# Patient Record
Sex: FEMALE | Race: WHITE | NOT HISPANIC OR LATINO | Employment: OTHER | URBAN - METROPOLITAN AREA
[De-identification: names, ages, dates, MRNs, and addresses within clinical notes are randomized per-mention and may not be internally consistent; named-entity substitution may affect disease eponyms.]

---

## 2021-01-18 ENCOUNTER — HOSPITAL ENCOUNTER (EMERGENCY)
Facility: CLINIC | Age: 61
Discharge: HOME | End: 2021-01-18
Attending: EMERGENCY MEDICINE | Admitting: EMERGENCY MEDICINE
Payer: COMMERCIAL

## 2021-01-18 ENCOUNTER — APPOINTMENT (OUTPATIENT)
Dept: CARDIOLOGY | Facility: CLINIC | Age: 61
End: 2021-01-18
Attending: EMERGENCY MEDICINE
Payer: COMMERCIAL

## 2021-01-18 VITALS
RESPIRATION RATE: 13 BRPM | HEART RATE: 62 BPM | OXYGEN SATURATION: 96 % | SYSTOLIC BLOOD PRESSURE: 135 MMHG | TEMPERATURE: 98.5 F | DIASTOLIC BLOOD PRESSURE: 83 MMHG

## 2021-01-18 DIAGNOSIS — R00.2 PALPITATIONS: ICD-10-CM

## 2021-01-18 LAB
ANION GAP SERPL CALCULATED.3IONS-SCNC: 7 MMOL/L (ref 3–14)
BASOPHILS # BLD AUTO: 0 10E9/L (ref 0–0.2)
BASOPHILS NFR BLD AUTO: 0.6 %
BUN SERPL-MCNC: 18 MG/DL (ref 7–30)
CALCIUM SERPL-MCNC: 9.4 MG/DL (ref 8.5–10.1)
CHLORIDE SERPL-SCNC: 108 MMOL/L (ref 94–109)
CO2 SERPL-SCNC: 25 MMOL/L (ref 20–32)
CREAT SERPL-MCNC: 0.83 MG/DL (ref 0.52–1.04)
DIFFERENTIAL METHOD BLD: NORMAL
EOSINOPHIL # BLD AUTO: 0.1 10E9/L (ref 0–0.7)
EOSINOPHIL NFR BLD AUTO: 1.3 %
ERYTHROCYTE [DISTWIDTH] IN BLOOD BY AUTOMATED COUNT: 12.5 % (ref 10–15)
GFR SERPL CREATININE-BSD FRML MDRD: 77 ML/MIN/{1.73_M2}
GLUCOSE SERPL-MCNC: 101 MG/DL (ref 70–99)
HCT VFR BLD AUTO: 42 % (ref 35–47)
HGB BLD-MCNC: 13.9 G/DL (ref 11.7–15.7)
IMM GRANULOCYTES # BLD: 0 10E9/L (ref 0–0.4)
IMM GRANULOCYTES NFR BLD: 0.2 %
INTERPRETATION ECG - MUSE: NORMAL
LYMPHOCYTES # BLD AUTO: 1 10E9/L (ref 0.8–5.3)
LYMPHOCYTES NFR BLD AUTO: 20.8 %
MCH RBC QN AUTO: 32.3 PG (ref 26.5–33)
MCHC RBC AUTO-ENTMCNC: 33.1 G/DL (ref 31.5–36.5)
MCV RBC AUTO: 98 FL (ref 78–100)
MONOCYTES # BLD AUTO: 0.4 10E9/L (ref 0–1.3)
MONOCYTES NFR BLD AUTO: 7.3 %
NEUTROPHILS # BLD AUTO: 3.3 10E9/L (ref 1.6–8.3)
NEUTROPHILS NFR BLD AUTO: 69.8 %
NRBC # BLD AUTO: 0 10*3/UL
NRBC BLD AUTO-RTO: 0 /100
PLATELET # BLD AUTO: 231 10E9/L (ref 150–450)
POTASSIUM SERPL-SCNC: 4.4 MMOL/L (ref 3.4–5.3)
RBC # BLD AUTO: 4.3 10E12/L (ref 3.8–5.2)
SODIUM SERPL-SCNC: 140 MMOL/L (ref 133–144)
TROPONIN I SERPL-MCNC: <0.015 UG/L (ref 0–0.04)
WBC # BLD AUTO: 4.8 10E9/L (ref 4–11)

## 2021-01-18 PROCEDURE — 93246 EXT ECG>7D<15D RECORDING: CPT

## 2021-01-18 PROCEDURE — 93248 EXT ECG>7D<15D REV&INTERPJ: CPT | Performed by: INTERNAL MEDICINE

## 2021-01-18 PROCEDURE — 84484 ASSAY OF TROPONIN QUANT: CPT | Performed by: EMERGENCY MEDICINE

## 2021-01-18 PROCEDURE — 80048 BASIC METABOLIC PNL TOTAL CA: CPT | Performed by: EMERGENCY MEDICINE

## 2021-01-18 PROCEDURE — 99284 EMERGENCY DEPT VISIT MOD MDM: CPT

## 2021-01-18 PROCEDURE — 85025 COMPLETE CBC W/AUTO DIFF WBC: CPT | Performed by: EMERGENCY MEDICINE

## 2021-01-18 PROCEDURE — 93005 ELECTROCARDIOGRAM TRACING: CPT | Mod: 59

## 2021-01-18 RX ORDER — MULTIPLE VITAMINS W/ MINERALS TAB 9MG-400MCG
1 TAB ORAL DAILY
COMMUNITY
End: 2023-05-23

## 2021-01-18 SDOH — HEALTH STABILITY: MENTAL HEALTH: HOW OFTEN DO YOU HAVE A DRINK CONTAINING ALCOHOL?: NEVER

## 2021-01-18 ASSESSMENT — ENCOUNTER SYMPTOMS
WEAKNESS: 1
DIARRHEA: 0
SHORTNESS OF BREATH: 0
CHILLS: 0
FEVER: 0
BACK PAIN: 0
VOMITING: 0
NAUSEA: 0
PALPITATIONS: 1
COUGH: 0
ABDOMINAL PAIN: 0

## 2021-01-18 NOTE — ED TRIAGE NOTES
Pt sts she called EMS because she had 3 episodes of her heart racing.  She sts she felt the racing in her upper abd.  Pt denies cp, sob or dizziness.  EMS gave 324mg of ASA.

## 2021-01-18 NOTE — ED NOTES
Bed: ED19  Expected date:   Expected time:   Means of arrival:   Comments:  Yellow [facility] [address] 60F, CP palpitations.

## 2021-01-18 NOTE — ED PROVIDER NOTES
History   Chief Complaint:  Palpitations     The history is provided by the patient.      Sarah Frost is a 60 year old female with history of ventricular septal defect and aortic defect, congenital who presents with palpitations. She reports that she had strong heart palpitations. She reports that this morning she has felt off and clumsy. She states that she had troubles getting out of her chair during the episode due to generalized weakness. The first episode started around noon and lasted around a minute. After 2 more similar episodes of palpitations she called EMS. She denies pain, chest pain, abdominal pain, nausea, or emesis.  She denies weakness or numbness in arm or leg or speech disturbance or visual disturbance.  She states that she had 3 glasses of wine last night and usually has some 2-3 times a week. She denies withdrawals from alcohol.     Review of Systems   Constitutional: Negative for chills and fever.   Respiratory: Negative for cough and shortness of breath.    Cardiovascular: Positive for palpitations. Negative for chest pain.   Gastrointestinal: Negative for abdominal pain, diarrhea, nausea and vomiting.   Musculoskeletal: Negative for back pain.   Neurological: Positive for weakness (or arms and legs).   A 10 point ROS was obtained and negative except as noted here and in HPI      Allergies:  No known drug allergies     Medications:  Coenzyme Q    Past Medical History:    Congenital anomaly of heart valve, ventricular septal defect  Squamous cell carcinoma     Past Surgical History:    Tonsillectomy   Heart Cath   Total abdominal hysterectomy     Family History:    Heart disease, father  Cancer, mother   Mental disorder, brother  Depression, sister    Social History:  Smoking status: never smoker  Alcohol use: yes, last night none today  Drug use: no  Presents to the ED alone     Physical Exam     Patient Vitals for the past 24 hrs:   BP Temp Pulse Resp SpO2   01/18/21 1630 -- -- 62 13 96  %   01/18/21 1615 -- -- 59 12 93 %   01/18/21 1600 135/83 -- 56 8 95 %   01/18/21 1555 -- -- 60 9 95 %   01/18/21 1535 -- -- 61 14 96 %   01/18/21 1530 (!) 153/88 -- 61 17 97 %   01/18/21 1515 -- -- 58 17 97 %   01/18/21 1500 (!) 140/91 98.5  F (36.9  C) 60 17 97 %   01/18/21 1445 (!) 151/98 -- 61 15 98 %     Physical Exam  VS: Reviewed per above  HENT: Mucous membranes moist, no nuchal rigidity  EYES: sclera anicteric  CV: Rate as noted, regular rhythm.   RESP: Effort normal. Breath sounds are normal bilaterally.  GI: no tenderness/rebound/guarding, not distended.  NEURO: GCS 15, cranial nerves II through XII are intact, 5 out of 5 strength in all 4 extremities, sensation is intact light touch in all 4 extremities  MSK: No deformity of the extremities  SKIN: Warm and dry    Emergency Department Course     ECG:  ECG taken at 1526, ECG read at 1530  Sinus bradycardia  Cannot rule out anterior infarct, age undetermined  Abnormal ECG  No prior EKG to compare  Rate 59 bpm. MN interval 148 ms. QRS duration 94 ms. QT/QTc 428/423 ms. P-R-T axes -8 5 27.     Laboratory:  CBC: WBC 4.8, HGB 13.9,    BMP: Glucose 101(H), Creatinine: 0.83  Troponin (1548) <0.015     Emergency Department Course:  Reviewed:  I reviewed the patient's nursing notes, vitals, past medical records, Care Everywhere.     Assessments:  1520 I performed an assessment and examination of the patient as noted above.     1621 Findings and plan explained to the Patient. Patient discharged home with instructions regarding supportive care, medications, and reasons to return. The importance of close follow-up was reviewed.     Disposition:  The patient was discharged to home.       Impression & Plan   Medical Decision Making:  Patient presents to the ER for evaluation of episodes of palpitations today and episode of generalized weakness.  On arrival vital signs are reassuring.  On exam there are no focal neurological deficits to suggest occult stroke.  EKG  is sinus rhythm without ischemic change and single troponin is negative.  No history of chest pain to suggest occult ACS.  No evidence electrolyte derangement or acute kidney injury.  While on the telemetry monitor in the ER, there was no evidence of dysrhythmia.  Patient was set up for Zio patch while in the ER.  I encouraged close primary care follow-up.    Diagnosis:    ICD-10-CM    1. Palpitations  R00.2        Scribe Disclosure:  I, Luli Luna, am serving as a scribe at 3:17 PM on 1/18/2021 to document services personally performed by Reza Mackenzie MD based on my observations and the provider's statements to me.              Reza Mackenzie MD  01/18/21 1901

## 2021-07-04 ENCOUNTER — HEALTH MAINTENANCE LETTER (OUTPATIENT)
Age: 61
End: 2021-07-04

## 2021-07-06 ENCOUNTER — APPOINTMENT (OUTPATIENT)
Dept: URBAN - METROPOLITAN AREA CLINIC 256 | Age: 61
Setting detail: DERMATOLOGY
End: 2021-07-06

## 2021-07-06 VITALS — HEIGHT: 72 IN | WEIGHT: 293 LBS | RESPIRATION RATE: 16 BRPM

## 2021-07-06 DIAGNOSIS — L81.4 OTHER MELANIN HYPERPIGMENTATION: ICD-10-CM

## 2021-07-06 DIAGNOSIS — L82.0 INFLAMED SEBORRHEIC KERATOSIS: ICD-10-CM

## 2021-07-06 PROCEDURE — 11102 TANGNTL BX SKIN SINGLE LES: CPT

## 2021-07-06 PROCEDURE — 99202 OFFICE O/P NEW SF 15 MIN: CPT | Mod: 25

## 2021-07-06 PROCEDURE — OTHER PATHOLOGY BILLING: OTHER

## 2021-07-06 PROCEDURE — OTHER BIOPSY BY SHAVE METHOD: OTHER

## 2021-07-06 PROCEDURE — 88305 TISSUE EXAM BY PATHOLOGIST: CPT

## 2021-07-06 PROCEDURE — OTHER COUNSELING: OTHER

## 2021-07-06 ASSESSMENT — LOCATION SIMPLE DESCRIPTION DERM
LOCATION SIMPLE: CHEST
LOCATION SIMPLE: LEFT CHEEK

## 2021-07-06 ASSESSMENT — LOCATION DETAILED DESCRIPTION DERM
LOCATION DETAILED: LEFT MEDIAL SUPERIOR CHEST
LOCATION DETAILED: LEFT INFERIOR CENTRAL MALAR CHEEK

## 2021-07-06 ASSESSMENT — LOCATION ZONE DERM
LOCATION ZONE: TRUNK
LOCATION ZONE: FACE

## 2021-07-06 NOTE — PROCEDURE: PATHOLOGY BILLING
Immunohistochemistry ([id number] and [id number]) billing is not performed here. Please use the Immunohistochemistry Stain Billing plan to accomplish this. Immunohistochemistry ([id number] and [id number]) billing is not performed here. Please use the Immunohistochemistry Stain Billing plan to accomplish this.

## 2021-07-06 NOTE — PROCEDURE: BIOPSY BY SHAVE METHOD
Hide Anesthesia Volume?: No
Anesthesia Volume In Cc (Will Not Render If 0): 0.2
Depth Of Biopsy: dermis
Dressing: bandage
Silver Nitrate Text: The wound bed was treated with silver nitrate after the biopsy was performed.
Biopsy Type: H and E
X Size Of Lesion In Cm: 0
Post-Care Instructions: I reviewed with the patient in detail post-care instructions. Patient is to keep the biopsy site dry overnight, and then apply bacitracin twice daily until healed. Patient may apply hydrogen peroxide soaks to remove any crusting.
Hemostasis: Drysol
Was A Bandage Applied: Yes
Cryotherapy Text: The wound bed was treated with cryotherapy after the biopsy was performed.
Curettage Text: The wound bed was treated with curettage after the biopsy was performed.
Notification Instructions: Patient will be notified of biopsy results. However, patient instructed to call the office if not contacted within 2 weeks.
Electrodesiccation Text: The wound bed was treated with electrodesiccation after the biopsy was performed.
Consent: Written consent was obtained and risks were reviewed including but not limited to scarring, infection, bleeding, scabbing, incomplete removal, nerve damage and allergy to anesthesia.
Biopsy Method: Dermablade
Detail Level: Detailed
Information: Selecting Yes will display possible errors in your note based on the variables you have selected. This validation is only offered as a suggestion for you. PLEASE NOTE THAT THE VALIDATION TEXT WILL BE REMOVED WHEN YOU FINALIZE YOUR NOTE. IF YOU WANT TO FAX A PRELIMINARY NOTE YOU WILL NEED TO TOGGLE THIS TO 'NO' IF YOU DO NOT WANT IT IN YOUR FAXED NOTE.
Electrodesiccation And Curettage Text: The wound bed was treated with electrodesiccation and curettage after the biopsy was performed.
Anesthesia Type: 2% lidocaine with epinephrine and a 1:10 solution of 8.4% sodium bicarbonate
Wound Care: Petrolatum
Billing Type: Client Bill
Type Of Destruction Used: Curettage

## 2021-10-24 ENCOUNTER — HEALTH MAINTENANCE LETTER (OUTPATIENT)
Age: 61
End: 2021-10-24

## 2022-07-31 ENCOUNTER — HEALTH MAINTENANCE LETTER (OUTPATIENT)
Age: 62
End: 2022-07-31

## 2022-10-15 ENCOUNTER — HEALTH MAINTENANCE LETTER (OUTPATIENT)
Age: 62
End: 2022-10-15

## 2023-05-23 ENCOUNTER — APPOINTMENT (OUTPATIENT)
Dept: MRI IMAGING | Facility: CLINIC | Age: 63
DRG: 025 | End: 2023-05-23
Attending: EMERGENCY MEDICINE
Payer: COMMERCIAL

## 2023-05-23 ENCOUNTER — APPOINTMENT (OUTPATIENT)
Dept: CT IMAGING | Facility: CLINIC | Age: 63
DRG: 025 | End: 2023-05-23
Attending: EMERGENCY MEDICINE
Payer: COMMERCIAL

## 2023-05-23 ENCOUNTER — APPOINTMENT (OUTPATIENT)
Dept: MRI IMAGING | Facility: CLINIC | Age: 63
DRG: 025 | End: 2023-05-23
Attending: PHYSICIAN ASSISTANT
Payer: COMMERCIAL

## 2023-05-23 ENCOUNTER — HOSPITAL ENCOUNTER (INPATIENT)
Facility: CLINIC | Age: 63
LOS: 6 days | Discharge: HOME | DRG: 025 | End: 2023-05-29
Attending: EMERGENCY MEDICINE | Admitting: HOSPITALIST
Payer: COMMERCIAL

## 2023-05-23 DIAGNOSIS — G93.89 BRAIN MASS: ICD-10-CM

## 2023-05-23 LAB
ANION GAP SERPL CALCULATED.3IONS-SCNC: 11 MMOL/L (ref 7–15)
APTT PPP: 24 SECONDS (ref 22–38)
ATRIAL RATE - MUSE: 64 BPM
BASOPHILS # BLD AUTO: 0 10E3/UL (ref 0–0.2)
BASOPHILS NFR BLD AUTO: 1 %
BUN SERPL-MCNC: 16.4 MG/DL (ref 8–23)
CALCIUM SERPL-MCNC: 9.4 MG/DL (ref 8.8–10.2)
CHLORIDE SERPL-SCNC: 98 MMOL/L (ref 98–107)
CREAT SERPL-MCNC: 0.98 MG/DL (ref 0.51–0.95)
DEPRECATED HCO3 PLAS-SCNC: 24 MMOL/L (ref 22–29)
DIASTOLIC BLOOD PRESSURE - MUSE: NORMAL MMHG
EOSINOPHIL # BLD AUTO: 0 10E3/UL (ref 0–0.7)
EOSINOPHIL NFR BLD AUTO: 1 %
ERYTHROCYTE [DISTWIDTH] IN BLOOD BY AUTOMATED COUNT: 11.7 % (ref 10–15)
GFR SERPL CREATININE-BSD FRML MDRD: 65 ML/MIN/1.73M2
GLUCOSE SERPL-MCNC: 99 MG/DL (ref 70–99)
HCT VFR BLD AUTO: 41.4 % (ref 35–47)
HGB BLD-MCNC: 14.7 G/DL (ref 11.7–15.7)
IMM GRANULOCYTES # BLD: 0 10E3/UL
IMM GRANULOCYTES NFR BLD: 0 %
INR PPP: 1.01 (ref 0.85–1.15)
INTERPRETATION ECG - MUSE: NORMAL
LYMPHOCYTES # BLD AUTO: 1.3 10E3/UL (ref 0.8–5.3)
LYMPHOCYTES NFR BLD AUTO: 23 %
MCH RBC QN AUTO: 33 PG (ref 26.5–33)
MCHC RBC AUTO-ENTMCNC: 35.5 G/DL (ref 31.5–36.5)
MCV RBC AUTO: 93 FL (ref 78–100)
MONOCYTES # BLD AUTO: 0.7 10E3/UL (ref 0–1.3)
MONOCYTES NFR BLD AUTO: 11 %
NEUTROPHILS # BLD AUTO: 3.7 10E3/UL (ref 1.6–8.3)
NEUTROPHILS NFR BLD AUTO: 64 %
NRBC # BLD AUTO: 0 10E3/UL
NRBC BLD AUTO-RTO: 0 /100
P AXIS - MUSE: 59 DEGREES
PLATELET # BLD AUTO: 199 10E3/UL (ref 150–450)
POTASSIUM SERPL-SCNC: 4.2 MMOL/L (ref 3.4–5.3)
PR INTERVAL - MUSE: 194 MS
QRS DURATION - MUSE: 92 MS
QT - MUSE: 416 MS
QTC - MUSE: 429 MS
R AXIS - MUSE: 49 DEGREES
RBC # BLD AUTO: 4.45 10E6/UL (ref 3.8–5.2)
SODIUM SERPL-SCNC: 133 MMOL/L (ref 136–145)
SYSTOLIC BLOOD PRESSURE - MUSE: NORMAL MMHG
T AXIS - MUSE: 59 DEGREES
TROPONIN T SERPL HS-MCNC: 7 NG/L
VENTRICULAR RATE- MUSE: 64 BPM
WBC # BLD AUTO: 5.8 10E3/UL (ref 4–11)

## 2023-05-23 PROCEDURE — 85610 PROTHROMBIN TIME: CPT | Performed by: EMERGENCY MEDICINE

## 2023-05-23 PROCEDURE — A9585 GADOBUTROL INJECTION: HCPCS | Performed by: EMERGENCY MEDICINE

## 2023-05-23 PROCEDURE — 70498 CT ANGIOGRAPHY NECK: CPT

## 2023-05-23 PROCEDURE — 93005 ELECTROCARDIOGRAM TRACING: CPT

## 2023-05-23 PROCEDURE — 36415 COLL VENOUS BLD VENIPUNCTURE: CPT | Performed by: EMERGENCY MEDICINE

## 2023-05-23 PROCEDURE — 255N000002 HC RX 255 OP 636: Performed by: EMERGENCY MEDICINE

## 2023-05-23 PROCEDURE — 99285 EMERGENCY DEPT VISIT HI MDM: CPT | Mod: 25

## 2023-05-23 PROCEDURE — 70552 MRI BRAIN STEM W/DYE: CPT

## 2023-05-23 PROCEDURE — 99223 1ST HOSP IP/OBS HIGH 75: CPT | Performed by: PHYSICIAN ASSISTANT

## 2023-05-23 PROCEDURE — 250N000011 HC RX IP 250 OP 636: Performed by: HOSPITALIST

## 2023-05-23 PROCEDURE — 70553 MRI BRAIN STEM W/O & W/DYE: CPT

## 2023-05-23 PROCEDURE — 99223 1ST HOSP IP/OBS HIGH 75: CPT | Performed by: HOSPITALIST

## 2023-05-23 PROCEDURE — 250N000011 HC RX IP 250 OP 636: Performed by: EMERGENCY MEDICINE

## 2023-05-23 PROCEDURE — 85004 AUTOMATED DIFF WBC COUNT: CPT | Performed by: EMERGENCY MEDICINE

## 2023-05-23 PROCEDURE — 200N000001 HC R&B ICU

## 2023-05-23 PROCEDURE — 84484 ASSAY OF TROPONIN QUANT: CPT | Performed by: EMERGENCY MEDICINE

## 2023-05-23 PROCEDURE — 85730 THROMBOPLASTIN TIME PARTIAL: CPT | Performed by: EMERGENCY MEDICINE

## 2023-05-23 PROCEDURE — 80048 BASIC METABOLIC PNL TOTAL CA: CPT | Performed by: EMERGENCY MEDICINE

## 2023-05-23 PROCEDURE — 250N000013 HC RX MED GY IP 250 OP 250 PS 637: Performed by: EMERGENCY MEDICINE

## 2023-05-23 PROCEDURE — 250N000009 HC RX 250: Performed by: EMERGENCY MEDICINE

## 2023-05-23 PROCEDURE — 70450 CT HEAD/BRAIN W/O DYE: CPT

## 2023-05-23 PROCEDURE — 96374 THER/PROPH/DIAG INJ IV PUSH: CPT | Mod: 59

## 2023-05-23 RX ORDER — DEXAMETHASONE SODIUM PHOSPHATE 10 MG/ML
10 INJECTION, SOLUTION INTRAMUSCULAR; INTRAVENOUS ONCE
Status: COMPLETED | OUTPATIENT
Start: 2023-05-23 | End: 2023-05-23

## 2023-05-23 RX ORDER — IOPAMIDOL 755 MG/ML
75 INJECTION, SOLUTION INTRAVASCULAR ONCE
Status: COMPLETED | OUTPATIENT
Start: 2023-05-23 | End: 2023-05-23

## 2023-05-23 RX ORDER — LEVETIRACETAM 500 MG/1
500 TABLET ORAL 2 TIMES DAILY
Status: DISCONTINUED | OUTPATIENT
Start: 2023-05-23 | End: 2023-05-29 | Stop reason: HOSPADM

## 2023-05-23 RX ORDER — GADOBUTROL 604.72 MG/ML
15 INJECTION INTRAVENOUS ONCE
Status: COMPLETED | OUTPATIENT
Start: 2023-05-23 | End: 2023-05-23

## 2023-05-23 RX ORDER — DEXAMETHASONE SODIUM PHOSPHATE 4 MG/ML
4 INJECTION, SOLUTION INTRA-ARTICULAR; INTRALESIONAL; INTRAMUSCULAR; INTRAVENOUS; SOFT TISSUE EVERY 6 HOURS
Status: DISCONTINUED | OUTPATIENT
Start: 2023-05-24 | End: 2023-05-26

## 2023-05-23 RX ADMIN — LEVETIRACETAM 500 MG: 500 TABLET, FILM COATED ORAL at 19:35

## 2023-05-23 RX ADMIN — IOPAMIDOL 75 ML: 755 INJECTION, SOLUTION INTRAVENOUS at 18:15

## 2023-05-23 RX ADMIN — DEXAMETHASONE SODIUM PHOSPHATE 10 MG: 10 INJECTION, SOLUTION INTRAMUSCULAR; INTRAVENOUS at 19:56

## 2023-05-23 RX ADMIN — GADOBUTROL 15 ML: 604.72 INJECTION INTRAVENOUS at 20:52

## 2023-05-23 RX ADMIN — SODIUM CHLORIDE 100 ML: 9 INJECTION, SOLUTION INTRAVENOUS at 18:15

## 2023-05-23 ASSESSMENT — ACTIVITIES OF DAILY LIVING (ADL)
ADLS_ACUITY_SCORE: 35

## 2023-05-23 NOTE — ED TRIAGE NOTES
Patient here with  with right sided weakness and visual floaters that begun Sunday/ Symptoms went away then came back today. LKW 1630. Hx 2021 - Similar symptoms, was seen here.

## 2023-05-23 NOTE — ED NOTES
Tele-PIT/Intake Evaluation      Video-Visit Details    Type of service:  Video Visit    Video Start Time (time video started): 6:07 PM  Video End Time (time video stopped): 6:10 PM   Originating Location (pt. Location):  [facility]  Distant Location (provider location):  [facility]  Mode of Communication:  Video Conference via Talko  Patient verbally consented to [facility] televisit.    History:  Patient presents with right arm and leg weakness, she states that her symptoms started at approximately 3:30 PM.  She had similar symptoms on Sunday that completely resolved.  From reports she has had similar symptoms in the past that resolved.    Exam:  Neuro: Slight right upper extremity drift otherwise deferred for in person evaluation  Patient Vitals for the past 24 hrs:   BP Pulse Resp SpO2 Weight   05/23/23 1808 (!) 149/82 68 16 98 % 90.7 kg (200 lb)       Appropriate interventions for symptom management were initiated if applicable.  Appropriate diagnostic tests were initiated if indicated.    Important information for subsequent clinician:  Patient with 2-1/2 hours of right-sided weakness, advised nurse to activated tier 1 code stroke for in person evaluation.    I briefly evaluated the patient and developed an initial plan of care. I discussed this plan and explained that this brief interaction does not constitute a full evaluation. Patient/family understands that they should wait to be fully evaluated and discuss any test results with another clinician prior to leaving the hospital.     Rich Gilmore MD  05/23/23 6721

## 2023-05-23 NOTE — CONSULTS
[facility]    Stroke Telephone Note    I was called by Joselo Ott on 05/23/23 regarding patient Sarah Frost. The patient is a 63 year old female with no significant PMH who presented with right sided weakness that started around 3:30 PM. She had an episode of RLE weakness on Sunday that completely resolved within 15 minutes. No reported history of seizures    Stroke Code Data (for stroke code without tele)  Stroke code activated 05/23/23 1812   Stroke provider first response  05/23/23 1814            Last known normal 05/23/23   1530        Time of discovery   (or onset of symptoms) 05/23/23   1530   Head CT read by Stroke Neuro Dr/Provider 05/23/23   1824   Was stroke code de-escalated? 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  CTA Head/Neck showed no proximal vessel occlusion or significant stenosis    Intravenous Thrombolysis  Not given due to:   - intraparenchymal mass with edema and likely some hemorrhage    Endovascular Treatment  Not initiated due to absence of proximal vessel occlusion    Impression  Acute onset right sided weakness secondary to left parieto-occipital mass with vasogenic edema vs focal seizures secondary to mass    Recommendations   - Keppra 500 mg bid for possible focal seizures until formal EEG evaluation  - Stat Neurosurgery consult  - General Neurology consult for possible seizure management    My recommendations are based on the information provided over the phone by Sarah Frost's in-person providers. They are not intended to replace the clinical judgment of her in-person providers. I was not requested to personally see or examine the patient at this time.    Wayne Real MD       Department of Neurology       Securely message with the Vocera Web Console (learn more here)   To page me or covering stroke neurology team member, click here:  "[facility]  Choose \"On Call\" tab at top, then select \"NEUROLOGY/ALL SITES\" from middle drop-down box, press Enter, then look for \"stroke\" or \"telestroke\" for your site.        "

## 2023-05-24 ENCOUNTER — APPOINTMENT (OUTPATIENT)
Dept: CARDIOLOGY | Facility: CLINIC | Age: 63
DRG: 025 | End: 2023-05-24
Attending: PHYSICIAN ASSISTANT
Payer: COMMERCIAL

## 2023-05-24 ENCOUNTER — HOSPITAL ENCOUNTER (INPATIENT)
Dept: NEUROLOGY | Facility: CLINIC | Age: 63
Discharge: HOME | DRG: 025 | End: 2023-05-24
Attending: HOSPITALIST
Payer: COMMERCIAL

## 2023-05-24 ENCOUNTER — APPOINTMENT (OUTPATIENT)
Dept: CT IMAGING | Facility: CLINIC | Age: 63
DRG: 025 | End: 2023-05-24
Attending: HOSPITALIST
Payer: COMMERCIAL

## 2023-05-24 LAB
ANION GAP SERPL CALCULATED.3IONS-SCNC: 11 MMOL/L (ref 7–15)
BUN SERPL-MCNC: 14.3 MG/DL (ref 8–23)
CALCIUM SERPL-MCNC: 9.7 MG/DL (ref 8.8–10.2)
CHLORIDE SERPL-SCNC: 102 MMOL/L (ref 98–107)
CREAT SERPL-MCNC: 0.84 MG/DL (ref 0.51–0.95)
DEPRECATED HCO3 PLAS-SCNC: 23 MMOL/L (ref 22–29)
ERYTHROCYTE [DISTWIDTH] IN BLOOD BY AUTOMATED COUNT: 11.7 % (ref 10–15)
GFR SERPL CREATININE-BSD FRML MDRD: 78 ML/MIN/1.73M2
GLUCOSE BLDC GLUCOMTR-MCNC: 116 MG/DL (ref 70–99)
GLUCOSE BLDC GLUCOMTR-MCNC: 120 MG/DL (ref 70–99)
GLUCOSE SERPL-MCNC: 126 MG/DL (ref 70–99)
HCT VFR BLD AUTO: 41.8 % (ref 35–47)
HGB BLD-MCNC: 14.4 G/DL (ref 11.7–15.7)
LVEF ECHO: NORMAL
MAGNESIUM SERPL-MCNC: 2.2 MG/DL (ref 1.7–2.3)
MCH RBC QN AUTO: 32.1 PG (ref 26.5–33)
MCHC RBC AUTO-ENTMCNC: 34.4 G/DL (ref 31.5–36.5)
MCV RBC AUTO: 93 FL (ref 78–100)
PHOSPHATE SERPL-MCNC: 3.6 MG/DL (ref 2.5–4.5)
PLATELET # BLD AUTO: 207 10E3/UL (ref 150–450)
POTASSIUM SERPL-SCNC: 4.3 MMOL/L (ref 3.4–5.3)
RBC # BLD AUTO: 4.49 10E6/UL (ref 3.8–5.2)
SODIUM SERPL-SCNC: 136 MMOL/L (ref 136–145)
WBC # BLD AUTO: 4.3 10E3/UL (ref 4–11)

## 2023-05-24 PROCEDURE — 93306 TTE W/DOPPLER COMPLETE: CPT

## 2023-05-24 PROCEDURE — 250N000011 HC RX IP 250 OP 636: Performed by: HOSPITALIST

## 2023-05-24 PROCEDURE — 95816 EEG AWAKE AND DROWSY: CPT

## 2023-05-24 PROCEDURE — 83735 ASSAY OF MAGNESIUM: CPT | Performed by: HOSPITALIST

## 2023-05-24 PROCEDURE — 250N000013 HC RX MED GY IP 250 OP 250 PS 637: Performed by: EMERGENCY MEDICINE

## 2023-05-24 PROCEDURE — 74177 CT ABD & PELVIS W/CONTRAST: CPT

## 2023-05-24 PROCEDURE — 99222 1ST HOSP IP/OBS MODERATE 55: CPT | Mod: GC | Performed by: STUDENT IN AN ORGANIZED HEALTH CARE EDUCATION/TRAINING PROGRAM

## 2023-05-24 PROCEDURE — 95816 EEG AWAKE AND DROWSY: CPT | Mod: 26 | Performed by: PSYCHIATRY & NEUROLOGY

## 2023-05-24 PROCEDURE — 85027 COMPLETE CBC AUTOMATED: CPT | Performed by: HOSPITALIST

## 2023-05-24 PROCEDURE — 36415 COLL VENOUS BLD VENIPUNCTURE: CPT | Performed by: HOSPITALIST

## 2023-05-24 PROCEDURE — 99232 SBSQ HOSP IP/OBS MODERATE 35: CPT | Performed by: INTERNAL MEDICINE

## 2023-05-24 PROCEDURE — 80048 BASIC METABOLIC PNL TOTAL CA: CPT | Performed by: HOSPITALIST

## 2023-05-24 PROCEDURE — 258N000003 HC RX IP 258 OP 636: Performed by: HOSPITALIST

## 2023-05-24 PROCEDURE — 200N000001 HC R&B ICU

## 2023-05-24 PROCEDURE — 99222 1ST HOSP IP/OBS MODERATE 55: CPT | Performed by: INTERNAL MEDICINE

## 2023-05-24 PROCEDURE — 93306 TTE W/DOPPLER COMPLETE: CPT | Mod: 26 | Performed by: INTERNAL MEDICINE

## 2023-05-24 PROCEDURE — 84100 ASSAY OF PHOSPHORUS: CPT | Performed by: HOSPITALIST

## 2023-05-24 RX ORDER — ONDANSETRON 2 MG/ML
4 INJECTION INTRAMUSCULAR; INTRAVENOUS EVERY 6 HOURS PRN
Status: DISCONTINUED | OUTPATIENT
Start: 2023-05-24 | End: 2023-05-26

## 2023-05-24 RX ORDER — ACETAMINOPHEN 325 MG/1
650 TABLET ORAL EVERY 6 HOURS PRN
Status: DISCONTINUED | OUTPATIENT
Start: 2023-05-24 | End: 2023-05-26

## 2023-05-24 RX ORDER — LIDOCAINE 40 MG/G
CREAM TOPICAL
Status: DISCONTINUED | OUTPATIENT
Start: 2023-05-24 | End: 2023-05-29 | Stop reason: HOSPADM

## 2023-05-24 RX ORDER — PROCHLORPERAZINE 25 MG
25 SUPPOSITORY, RECTAL RECTAL EVERY 12 HOURS PRN
Status: DISCONTINUED | OUTPATIENT
Start: 2023-05-24 | End: 2023-05-26

## 2023-05-24 RX ORDER — SODIUM CHLORIDE 9 MG/ML
INJECTION, SOLUTION INTRAVENOUS CONTINUOUS
Status: DISCONTINUED | OUTPATIENT
Start: 2023-05-24 | End: 2023-05-24

## 2023-05-24 RX ORDER — POLYETHYLENE GLYCOL 3350 17 G/17G
17 POWDER, FOR SOLUTION ORAL DAILY PRN
Status: DISCONTINUED | OUTPATIENT
Start: 2023-05-24 | End: 2023-05-29 | Stop reason: HOSPADM

## 2023-05-24 RX ORDER — ACETAMINOPHEN 650 MG/1
650 SUPPOSITORY RECTAL EVERY 6 HOURS PRN
Status: DISCONTINUED | OUTPATIENT
Start: 2023-05-24 | End: 2023-05-29 | Stop reason: HOSPADM

## 2023-05-24 RX ORDER — ONDANSETRON 4 MG/1
4 TABLET, ORALLY DISINTEGRATING ORAL EVERY 6 HOURS PRN
Status: DISCONTINUED | OUTPATIENT
Start: 2023-05-24 | End: 2023-05-26

## 2023-05-24 RX ORDER — AMOXICILLIN 250 MG
2 CAPSULE ORAL 2 TIMES DAILY PRN
Status: DISCONTINUED | OUTPATIENT
Start: 2023-05-24 | End: 2023-05-29 | Stop reason: HOSPADM

## 2023-05-24 RX ORDER — AMOXICILLIN 250 MG
1 CAPSULE ORAL 2 TIMES DAILY PRN
Status: DISCONTINUED | OUTPATIENT
Start: 2023-05-24 | End: 2023-05-29 | Stop reason: HOSPADM

## 2023-05-24 RX ORDER — IOPAMIDOL 755 MG/ML
99 INJECTION, SOLUTION INTRAVASCULAR ONCE
Status: COMPLETED | OUTPATIENT
Start: 2023-05-24 | End: 2023-05-24

## 2023-05-24 RX ORDER — PROCHLORPERAZINE MALEATE 10 MG
10 TABLET ORAL EVERY 6 HOURS PRN
Status: DISCONTINUED | OUTPATIENT
Start: 2023-05-24 | End: 2023-05-26

## 2023-05-24 RX ADMIN — SODIUM CHLORIDE, PRESERVATIVE FREE: 5 INJECTION INTRAVENOUS at 11:35

## 2023-05-24 RX ADMIN — LEVETIRACETAM 500 MG: 500 TABLET, FILM COATED ORAL at 20:00

## 2023-05-24 RX ADMIN — SODIUM CHLORIDE, PRESERVATIVE FREE: 5 INJECTION INTRAVENOUS at 01:21

## 2023-05-24 RX ADMIN — DEXAMETHASONE SODIUM PHOSPHATE 4 MG: 4 INJECTION, SOLUTION INTRAMUSCULAR; INTRAVENOUS at 08:37

## 2023-05-24 RX ADMIN — DEXAMETHASONE SODIUM PHOSPHATE 4 MG: 4 INJECTION, SOLUTION INTRAMUSCULAR; INTRAVENOUS at 01:47

## 2023-05-24 RX ADMIN — LEVETIRACETAM 500 MG: 500 TABLET, FILM COATED ORAL at 08:37

## 2023-05-24 RX ADMIN — IOPAMIDOL 99 ML: 755 INJECTION, SOLUTION INTRAVENOUS at 09:09

## 2023-05-24 RX ADMIN — DEXAMETHASONE SODIUM PHOSPHATE 4 MG: 4 INJECTION, SOLUTION INTRAMUSCULAR; INTRAVENOUS at 14:13

## 2023-05-24 RX ADMIN — DEXAMETHASONE SODIUM PHOSPHATE 4 MG: 4 INJECTION, SOLUTION INTRAMUSCULAR; INTRAVENOUS at 20:01

## 2023-05-24 ASSESSMENT — ACTIVITIES OF DAILY LIVING (ADL)
ADLS_ACUITY_SCORE: 35
ADLS_ACUITY_SCORE: 20
TOILETING_ISSUES: NO
ADLS_ACUITY_SCORE: 20
ADLS_ACUITY_SCORE: 20
DOING_ERRANDS_INDEPENDENTLY_DIFFICULTY: NO
VISION_MANAGEMENT: GLASSES
CONCENTRATING,_REMEMBERING_OR_MAKING_DECISIONS_DIFFICULTY: NO
FALL_HISTORY_WITHIN_LAST_SIX_MONTHS: YES
ADLS_ACUITY_SCORE: 20
WEAR_GLASSES_OR_BLIND: YES
NUMBER_OF_TIMES_PATIENT_HAS_FALLEN_WITHIN_LAST_SIX_MONTHS: 1
ADLS_ACUITY_SCORE: 20
WALKING_OR_CLIMBING_STAIRS_DIFFICULTY: NO
ADLS_ACUITY_SCORE: 20
DRESSING/BATHING_DIFFICULTY: NO
ADLS_ACUITY_SCORE: 20
ADLS_ACUITY_SCORE: 20
DIFFICULTY_EATING/SWALLOWING: NO
ADLS_ACUITY_SCORE: 20
CHANGE_IN_FUNCTIONAL_STATUS_SINCE_ONSET_OF_CURRENT_ILLNESS/INJURY: YES

## 2023-05-24 NOTE — PLAN OF CARE
Oriented x4. Denies vision changes or headache. PERRL. C/O some weakness to RLE. Up to bathroom with SBA. VSS. Plan to repeat head CT and CT of abdomen/pelvis this morning.

## 2023-05-24 NOTE — ED NOTES
[facility]  ED Nurse Handoff Report    ED Chief complaint: Stroke Symptoms      ED Diagnosis:   Final diagnoses:   Brain mass       Code Status: Full Code    Allergies: No Known Allergies    Patient Story: History:  Patient presents with right arm and leg weakness, she states that her symptoms started at approximately 3:30 PM.  She had similar symptoms on Sunday that completely resolved.  From reports she has had similar symptoms in the past that resolved.    Focused Assessment:  R UA drift on arrival, floaters and weakness on arrival.  All resolved on their own without tx    Treatments and/or interventions provided:  New DX large surgical brain tumor.  Eladio Barrow    Patient's response to treatments and/or interventions:   Results for orders placed or performed during the hospital encounter of 05/23/23   CT Head w/o Contrast     Status: None    Narrative    EXAM: CT HEAD W/O CONTRAST, CTA HEAD NECK W CONTRAST  LOCATION: [facility]  DATE/TIME: 5/23/2023 6:20 PM CDT    INDICATION: Right-sided weakness. Visual changes Code Stroke to evaluate for potential thrombolysis and thrombectomy. PLEASE READ IMMEDIATELY.  COMPARISON: None.  CONTRAST: 75 mL isovue 370  TECHNIQUE: Head and neck CT angiogram with IV contrast. Noncontrast head CT followed by axial helical CT images of the head and neck vessels obtained during the arterial phase of intravenous contrast administration. Axial 2D reconstructed images and   multiplanar 3D MIP reconstructed images of the head and neck vessels were performed by the technologist. Dose reduction techniques were used. All stenosis measurements made according to NASCET criteria unless otherwise specified.    FINDINGS:   NONCONTRAST HEAD CT:   INTRACRANIAL CONTENTS: Large cortically based hypoattenuating mass within the left parietal lobe measures 52 mm AP x 47 mm TV x 42 mm CC. Moderate anterior adjacent vasogenic edema which extends into the  corpus callosum splenium. Mass effect includes   local sulcal effacement. Partial effacement of the left lateral oral and third ventricle. Rightward midline shift measures 5 mm. Partial effacement of the basilar cisterns. No downward transtentorial herniation. No intracranial hemorrhage or abnormal   extraaxial collection.  No CT evidence of acute infarct. No additional abnormal brain parenchymal attenuation.      VISUALIZED ORBITS/SINUSES/MASTOIDS: No intraorbital abnormality. No paranasal sinus mucosal disease. No middle ear or mastoid effusion.    BONES/SOFT TISSUES: No acute abnormality.    HEAD CTA:  ANTERIOR CIRCULATION: No stenosis or occlusion. No aneurysm or high flow vascular malformation. Hypertrophied branch of the left anterior cerebral artery supplies the very vascular left parietal lobe tumor. Standard [place] of [place] anatomy.    POSTERIOR CIRCULATION: No stenosis/occlusion, aneurysm, or high flow vascular malformation. Balanced vertebral arteries supply a normal basilar artery.     DURAL VENOUS SINUSES: Expected enhancement of the major dural venous sinuses.    NECK CTA:  RIGHT CAROTID: No measurable stenosis or dissection.    LEFT CAROTID: No measurable stenosis or dissection.    VERTEBRAL ARTERIES: No focal stenosis or dissection. Balanced vertebral arteries.    AORTIC ARCH: Classic aortic arch anatomy with no significant stenosis at the origin of the great vessels.    NONVASCULAR STRUCTURES: Unremarkable.      Impression    IMPRESSION:   HEAD CT:  1.  Cortically-based left parietal lobe tumor with moderate associated vasogenic edema. Recommend further characterization with MRI.  2.  Resultant mass effect includes 5 mm rightward midline shift, partial effacement of the basilar cisterns without transtentorial herniation, partial effacement of the third and left lateral ventricles, and    HEAD CTA:   1.  No large vessel occlusion or stenosis.  2.  Left anterior cerebral artery supplies the  hypervascular left parietal lobe tumor.    NECK CTA:  1.  Normal neck CTA.    Findings discussed with Dr. Josue pineal 624 and 6:31 PM on 05/23/2023   CTA Head Neck with Contrast     Status: None    Narrative    EXAM: CT HEAD W/O CONTRAST, CTA HEAD NECK W CONTRAST  LOCATION: [facility]  DATE/TIME: 5/23/2023 6:20 PM CDT    INDICATION: Right-sided weakness. Visual changes Code Stroke to evaluate for potential thrombolysis and thrombectomy. PLEASE READ IMMEDIATELY.  COMPARISON: None.  CONTRAST: 75 mL isovue 370  TECHNIQUE: Head and neck CT angiogram with IV contrast. Noncontrast head CT followed by axial helical CT images of the head and neck vessels obtained during the arterial phase of intravenous contrast administration. Axial 2D reconstructed images and   multiplanar 3D MIP reconstructed images of the head and neck vessels were performed by the technologist. Dose reduction techniques were used. All stenosis measurements made according to NASCET criteria unless otherwise specified.    FINDINGS:   NONCONTRAST HEAD CT:   INTRACRANIAL CONTENTS: Large cortically based hypoattenuating mass within the left parietal lobe measures 52 mm AP x 47 mm TV x 42 mm CC. Moderate anterior adjacent vasogenic edema which extends into the corpus callosum splenium. Mass effect includes   local sulcal effacement. Partial effacement of the left lateral oral and third ventricle. Rightward midline shift measures 5 mm. Partial effacement of the basilar cisterns. No downward transtentorial herniation. No intracranial hemorrhage or abnormal   extraaxial collection.  No CT evidence of acute infarct. No additional abnormal brain parenchymal attenuation.      VISUALIZED ORBITS/SINUSES/MASTOIDS: No intraorbital abnormality. No paranasal sinus mucosal disease. No middle ear or mastoid effusion.    BONES/SOFT TISSUES: No acute abnormality.    HEAD CTA:  ANTERIOR CIRCULATION: No stenosis or occlusion. No aneurysm or  high flow vascular malformation. Hypertrophied branch of the left anterior cerebral artery supplies the very vascular left parietal lobe tumor. Standard [place] of [place] anatomy.    POSTERIOR CIRCULATION: No stenosis/occlusion, aneurysm, or high flow vascular malformation. Balanced vertebral arteries supply a normal basilar artery.     DURAL VENOUS SINUSES: Expected enhancement of the major dural venous sinuses.    NECK CTA:  RIGHT CAROTID: No measurable stenosis or dissection.    LEFT CAROTID: No measurable stenosis or dissection.    VERTEBRAL ARTERIES: No focal stenosis or dissection. Balanced vertebral arteries.    AORTIC ARCH: Classic aortic arch anatomy with no significant stenosis at the origin of the great vessels.    NONVASCULAR STRUCTURES: Unremarkable.      Impression    IMPRESSION:   HEAD CT:  1.  Cortically-based left parietal lobe tumor with moderate associated vasogenic edema. Recommend further characterization with MRI.  2.  Resultant mass effect includes 5 mm rightward midline shift, partial effacement of the basilar cisterns without transtentorial herniation, partial effacement of the third and left lateral ventricles, and    HEAD CTA:   1.  No large vessel occlusion or stenosis.  2.  Left anterior cerebral artery supplies the hypervascular left parietal lobe tumor.    NECK CTA:  1.  Normal neck CTA.    Findings discussed with Dr. Josue pineal 624 and 6:31 PM on 05/23/2023   MR Brain w/o & w Contrast     Status: None    Narrative    EXAM: MR BRAIN W/O and W CONTRAST, MR BRAIN W CONTRAST STEREOTACTIC  LOCATION: [facility]  DATE/TIME: 5/23/2023 8:55 PM CDT    INDICATION: New left-sided brain mass on CT.  COMPARISON: CT brain 5/23/2023. MRI brain 5/23/2023.  CONTRAST: 15 mL Gadavist  TECHNIQUE: Multiplanar multisequence head MRI without and with intravenous contrast with dedicated surgical/therapy planning sequences.    FINDINGS:  INTRACRANIAL CONTENTS: There is a large  heterogeneous enhancing left parietal mass measuring approximately 6.4 x 4.3 x 4.5 cm in maximal dimensions. The lesion is favored to be cortically based intra-axial and demonstrates areas of heterogeneous   enhancement and areas of internal T2 prolongation and enhancement compatible with cystic change or central necrosis. Enhancement pattern, however, suggested this may be extra-axial and dural-based with areas of thicker enhancement seen along the parietal   inner table radiating centrally from the inner table. Several prominent flow voids are seen along the surface of the lesion. Moderate FLAIR hyperintensity compatible with vasogenic edema is seen within the more ventral left parietal and posterior left   frontal lobes. There is a second plaque-like area of abnormal dural enhancement arising from the posterior falx on the left, nonspecific but compatible with a small meningioma (axial image 21 of series 13, sagittal image 98 of series 14).    Left-sided mass effect with approximately 6 mm left to right midline shift measured at the mid septum pellucidum. Effacement of the atrium of left lateral ventricle. No dilatation of the left temporal horn. Lateral and third ventricles otherwise normal   in size. Cerebellar tonsils are normally positioned. Visualized upper cervical spinal cord and corpus callosum are unremarkable. Major skull base vascular flow-voids are preserved.    Although not well visualized on the current exam, note is made of a 4 mm rim-enhancing lesion along the caudal ventral margin of the infundibulum/superior margin of the pituitary gland (sagittal postcontrast image 98). In addition, within the posterior   aspect of the pituitary is a small 2 mm nonenhancing focus. Correlation with dedicated MRI of the sella is suggested for further assessment.    OSSEOUS STRUCTURES/SOFT TISSUES: Visualized marrow space is unremarkable. Specifically, no abnormal signal is seen within the right parietal  calvarium.    ORBITS: No abnormality accounting for technique.     SINUSES/MASTOIDS: Mild bilateral maxillary sinus mucosal thickening. Minimal fluid or mucosal thickening is seen within both mastoid sinuses.      Impression    IMPRESSION:  1.  There is a large heterogeneously enhancing solid and cystic or necrotic lesion arising from the posterior superior aspect of the left frontal lobe, favored to be intra-axial. Primary differential considerations include high-grade primary brain   neoplasm or metastasis. Moderate associated vasogenic edema.    2.  4 mm ring-enhancing lesion is seen along the ventral base of the infundibulum, nonspecific. This could reflect an additional metastasis. Small plaque-like area of dural-based enhancement seen along the posterior aspect of the falx, nonspecific but   compatible with a meningioma (versus dural metastasis).    3.  2 mm nonenhancing focus posterior aspect of the pituitary likely reflecting a tiny cyst or cystically degenerated adenoma. Suggest further assessment with dedicated MRI of the sella.   MR Brain w Contrast Stereotactic     Status: None    Narrative    EXAM: MR BRAIN W/O and W CONTRAST, MR BRAIN W CONTRAST STEREOTACTIC  LOCATION: [facility]  DATE/TIME: 5/23/2023 8:55 PM CDT    INDICATION: New left-sided brain mass on CT.  COMPARISON: CT brain 5/23/2023. MRI brain 5/23/2023.  CONTRAST: 15 mL Gadavist  TECHNIQUE: Multiplanar multisequence head MRI without and with intravenous contrast with dedicated surgical/therapy planning sequences.    FINDINGS:  INTRACRANIAL CONTENTS: There is a large heterogeneous enhancing left parietal mass measuring approximately 6.4 x 4.3 x 4.5 cm in maximal dimensions. The lesion is favored to be cortically based intra-axial and demonstrates areas of heterogeneous   enhancement and areas of internal T2 prolongation and enhancement compatible with cystic change or central necrosis. Enhancement pattern, however,  suggested this may be extra-axial and dural-based with areas of thicker enhancement seen along the parietal   inner table radiating centrally from the inner table. Several prominent flow voids are seen along the surface of the lesion. Moderate FLAIR hyperintensity compatible with vasogenic edema is seen within the more ventral left parietal and posterior left   frontal lobes. There is a second plaque-like area of abnormal dural enhancement arising from the posterior falx on the left, nonspecific but compatible with a small meningioma (axial image 21 of series 13, sagittal image 98 of series 14).    Left-sided mass effect with approximately 6 mm left to right midline shift measured at the mid septum pellucidum. Effacement of the atrium of left lateral ventricle. No dilatation of the left temporal horn. Lateral and third ventricles otherwise normal   in size. Cerebellar tonsils are normally positioned. Visualized upper cervical spinal cord and corpus callosum are unremarkable. Major skull base vascular flow-voids are preserved.    Although not well visualized on the current exam, note is made of a 4 mm rim-enhancing lesion along the caudal ventral margin of the infundibulum/superior margin of the pituitary gland (sagittal postcontrast image 98). In addition, within the posterior   aspect of the pituitary is a small 2 mm nonenhancing focus. Correlation with dedicated MRI of the sella is suggested for further assessment.    OSSEOUS STRUCTURES/SOFT TISSUES: Visualized marrow space is unremarkable. Specifically, no abnormal signal is seen within the right parietal calvarium.    ORBITS: No abnormality accounting for technique.     SINUSES/MASTOIDS: Mild bilateral maxillary sinus mucosal thickening. Minimal fluid or mucosal thickening is seen within both mastoid sinuses.      Impression    IMPRESSION:  1.  There is a large heterogeneously enhancing solid and cystic or necrotic lesion arising from the posterior superior  aspect of the left frontal lobe, favored to be intra-axial. Primary differential considerations include high-grade primary brain   neoplasm or metastasis. Moderate associated vasogenic edema.    2.  4 mm ring-enhancing lesion is seen along the ventral base of the infundibulum, nonspecific. This could reflect an additional metastasis. Small plaque-like area of dural-based enhancement seen along the posterior aspect of the falx, nonspecific but   compatible with a meningioma (versus dural metastasis).    3.  2 mm nonenhancing focus posterior aspect of the pituitary likely reflecting a tiny cyst or cystically degenerated adenoma. Suggest further assessment with dedicated MRI of the sella.   Basic metabolic panel     Status: Abnormal   Result Value Ref Range    Sodium 133 (L) 136 - 145 mmol/L    Potassium 4.2 3.4 - 5.3 mmol/L    Chloride 98 98 - 107 mmol/L    Carbon Dioxide (CO2) 24 22 - 29 mmol/L    Anion Gap 11 7 - 15 mmol/L    Urea Nitrogen 16.4 8.0 - 23.0 mg/dL    Creatinine 0.98 (H) 0.51 - 0.95 mg/dL    Calcium 9.4 8.8 - 10.2 mg/dL    Glucose 99 70 - 99 mg/dL    GFR Estimate 65 >60 mL/min/1.73m2   INR     Status: Normal   Result Value Ref Range    INR 1.01 0.85 - 1.15   Partial thromboplastin time     Status: Normal   Result Value Ref Range    aPTT 24 22 - 38 Seconds   Troponin T, High Sensitivity     Status: Normal   Result Value Ref Range    Troponin T, High Sensitivity 7 <=14 ng/L   CBC with platelets and differential     Status: None   Result Value Ref Range    WBC Count 5.8 4.0 - 11.0 10e3/uL    RBC Count 4.45 3.80 - 5.20 10e6/uL    Hemoglobin 14.7 11.7 - 15.7 g/dL    Hematocrit 41.4 35.0 - 47.0 %    MCV 93 78 - 100 fL    MCH 33.0 26.5 - 33.0 pg    MCHC 35.5 31.5 - 36.5 g/dL    RDW 11.7 10.0 - 15.0 %    Platelet Count 199 150 - 450 10e3/uL    % Neutrophils 64 %    % Lymphocytes 23 %    % Monocytes 11 %    % Eosinophils 1 %    % Basophils 1 %    % Immature Granulocytes 0 %    NRBCs per 100 WBC 0 <1 /100     Absolute Neutrophils 3.7 1.6 - 8.3 10e3/uL    Absolute Lymphocytes 1.3 0.8 - 5.3 10e3/uL    Absolute Monocytes 0.7 0.0 - 1.3 10e3/uL    Absolute Eosinophils 0.0 0.0 - 0.7 10e3/uL    Absolute Basophils 0.0 0.0 - 0.2 10e3/uL    Absolute Immature Granulocytes 0.0 <=0.4 10e3/uL    Absolute NRBCs 0.0 10e3/uL   EKG 12-lead, tracing only     Status: None   Result Value Ref Range    Systolic Blood Pressure  mmHg    Diastolic Blood Pressure  mmHg    Ventricular Rate 64 BPM    Atrial Rate 64 BPM    KS Interval 194 ms    QRS Duration 92 ms     ms    QTc 429 ms    P Axis 59 degrees    R AXIS 49 degrees    T Axis 59 degrees    Interpretation ECG       Sinus rhythm  Cannot rule out Anterior infarct (cited on or before 18-JAN-2021)  Abnormal ECG  When compared with ECG of 18-JAN-2021 15:26,  Nonspecific T wave abnormality no longer evident in Inferior leads  Confirmed by GENERATED REPORT, COMPUTER ([id number]),  Justina Hernandez ([id number]) on 5/23/2023 9:09:40 PM     CBC with Platelets & Differential     Status: None    Narrative    The following orders were created for panel order CBC with Platelets & Differential.  Procedure                               Abnormality         Status                     ---------                               -----------         ------                     CBC with platelets and d...[[id number]]                      Final result                 Please view results for these tests on the individual orders.         To be done/followed up on inpatient unit: CT at 0700 tomorrow (need to allow body to process contrast from stroke workup.  CT Chest Abd Pelvis is to check for malignancy.   Plan for surgery later this week.  Thurs/ Fri    Does this patient have any cognitive concerns?:  none    Activity level - Baseline/Home:  Independent  Activity Level - Current:   Independent    Patient's Preferred language: English   Needed?: No    Isolation: None  Infection: Not Applicable  Patient tested  for COVID 19 prior to admission: NO  Bariatric?: No    Vital Signs:   Vitals:    05/23/23 2115 05/23/23 2130 05/23/23 2145 05/23/23 2200   BP: 122/76 130/86 122/77 123/74   Pulse: 63 63 60 59   Resp: 14 16 13 11   Temp:       TempSrc:       SpO2: 95% 94% 96% 95%   Weight:           Cardiac Rhythm:     Was the PSS-3 completed:   Yes  What interventions are required if any?               Family Comments:  here  OBS brochure/video discussed/provided to patient/family: N/A              Name of person given brochure if not patient:               Relationship to patient:     For the majority of the shift this patient's behavior was Green.   Behavioral interventions performed were .    ED NURSE PHONE NUMBER: [phone]

## 2023-05-24 NOTE — PHARMACY-ADMISSION MEDICATION HISTORY
Pharmacy Intern Admission Medication History    Admission medication history is complete. The information provided in this note is only as accurate as the sources available at the time of the update.    Medication reconciliation/reorder completed by provider prior to medication history? Yes    Information Source(s): Patient via in-person    Pertinent Information: PATIENT TAKES VITAMINS OCCASIONALLY  Changes made to PTA medication list:    Added: None    Deleted: CIPRODEX 0.3-.01 % OT SUSP AND MULTIVIAMIN TAB    Changed: None    Medication Affordability:       Allergies reviewed with patient and updates made in EHR: yes    Medication History Completed By: Gamal Mendez 5/23/2023 9:16 PM    Prior to Admission medications    Not on File

## 2023-05-24 NOTE — PLAN OF CARE
Goal Outcome Evaluation:  Pt A/O x 4, uses the call light appropriately. Neuro intact denies vision changes or headache. Able to go to the bathroom with SBA, unsteady gait noted on longer walks, gait leans to the right and cannot perceive depth of walls and objects. Denies Feeling weakness on RUE and RLE. CT C/A/P done. Plan for craniotomy on Friday 5/26 at 0730

## 2023-05-24 NOTE — PROGRESS NOTES
[facility]    Stroke Progress Note    Interval Events- Improved right sided weakness.    HPI Summary  aSrah Trujillo is a 63 year old female, she is known to have bicuspid aortic valve and ASD since childhood, underwent multiple cardiac catheterizations, no interventions or repair. She presented yesterday with transient right sided weakness that began afternoon. She had an episode of RLE weakness on Sunday that completely resolved within 15 minutes. No reported history of seizures.  CT head revealed left frontoparietal mass with significant edema.       MRI:  1.  There is a large heterogeneously enhancing solid and cystic or necrotic lesion arising from the posterior superior aspect of the left frontal lobe, favored to be intra-axial. Primary differential considerations include high-grade primary brain   neoplasm or metastasis. Moderate associated vasogenic edema.  2.  4 mm ring-enhancing lesion is seen along the ventral base of the infundibulum, nonspecific. This could reflect an additional metastasis. Small plaque-like area of dural-based enhancement seen along the posterior aspect of the falx, nonspecific but   compatible with a meningioma (versus dural metastasis).  3.  2 mm nonenhancing focus posterior aspect of the pituitary likely reflecting a tiny cyst or cystically degenerated adenoma.     EEG:  electroencephalogram is abnormal due to the presence of focal delta slowing over the left posterior temporoparietal head region, consistent with structural abnormality there.  No electrographic seizures or epileptiform discharges were recorded.    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    Plan  - Neuro Checks Q 4 hrs  - Levetiracetam 500 BID (the history is suggestive of seizing at home)  - Decadron   - Craniotomy and resection on Friday per Neurosurgery.     Patient Follow-up      We will continue to follow.     The  Stroke/Neurocritical Care Staff is Dr. Black.    Christine Payne MD  Neurocritical Care Fellow  ______________________________________________________    Clinically Significant Risk Factors Present on Admission                              Medications   Scheduled Meds    dexamethasone  4 mg Intravenous Q6H     levETIRAcetam  500 mg Oral BID     sodium chloride (PF)  3 mL Intracatheter Q8H       Infusion Meds    sodium chloride 100 mL/hr at 05/24/23 0121       PRN Meds  acetaminophen **OR** acetaminophen, lidocaine 4%, lidocaine (buffered or not buffered), melatonin, ondansetron **OR** ondansetron, polyethylene glycol, prochlorperazine **OR** prochlorperazine **OR** prochlorperazine, senna-docusate **OR** senna-docusate, sodium chloride (PF)       PHYSICAL EXAMINATION  Temp:  [97.4  F (36.3  C)-98.1  F (36.7  C)] 97.4  F (36.3  C)  Pulse:  [51-82] 52  Resp:  [6-26] 14  BP: (100-149)/(59-98) 102/67  SpO2:  [89 %-98 %] 91 %      Neurologic  Mental Status:  alert, oriented x 3, follows commands, speech clear and fluent, naming and repetition normal  Cranial Nerves:  visual fields intact, PERRL, EOMI with normal smooth pursuit, facial sensation intact and symmetric, facial movements symmetric, hearing not formally tested but intact to conversation, palate elevation symmetric and uvula midline, no dysarthria, shoulder shrug strong bilaterally, tongue protrusion midline  Motor:  normal muscle tone and bulk, no abnormal movements, able to move all limbs spontaneously, strength 5/5 throughout upper and lower extremities, no pronator drift  Reflexes:  toes down-going  Sensory:  light touch sensation intact and symmetric throughout upper and lower extremities, no extinction on double simultaneous stimulation   Coordination:  normal finger-to-nose and heel-to-shin bilaterally without dysmetria, rapid alternating movements symmetric  Station/Gait:  deferred    Imaging  I personally reviewed all imaging; relevant findings per  HPI.     Lab Results Data   CBC  Recent Labs   Lab 05/24/23 0452 05/23/23 1813   WBC 4.3 5.8   RBC 4.49 4.45   HGB 14.4 14.7   HCT 41.8 41.4    199     Basic Metabolic Panel    Recent Labs   Lab 05/24/23 0452 05/24/23  0118 05/23/23 1813     --  133*   POTASSIUM 4.3  --  4.2   CHLORIDE 102  --  98   CO2 23  --  24   BUN 14.3  --  16.4   CR 0.84  --  0.98*   * 116* 99   QUAN 9.7  --  9.4     Liver Panel  No results for input(s): PROTTOTAL, ALBUMIN, BILITOTAL, ALKPHOS, AST, ALT, BILIDIRECT in the last 168 hours.  INR    Recent Labs   Lab Test 05/23/23 1813   INR 1.01      Lipid Profile  No lab results found.  A1C  No lab results found.  Troponin    Recent Labs   Lab 05/23/23 1813   CTROPT 7

## 2023-05-24 NOTE — ED PROVIDER NOTES
History     Chief Complaint:  Right-sided weakness    HPI   Sarah Frost is a 63 year old female right-sided weakness.  Patient reports that 2 days ago she had an episode of right lower extremity weakness that lasted roughly 15 minutes before resolving.  Today around 4 PM she had sudden onset of right upper and lower extremity weakness, right-handed numbness, wavy lines in vision, and coordination difficulty with right hand.  No witnessed seizure activity.  Patient called her  who brought the patient to the emergency department for evaluation.  Code stroke was initiated through triage.  Patient was able to ambulate into the ED.  Patient now reports that her symptoms have resolved.  Denies chest pain, shortness of breath, fever, or abdominal pain.      Independent Historian:   None - Patient Only    Review of External Notes:   N/A      Medications:    No current outpatient medications on file.      Past Medical History:    Past Medical History:   Diagnosis Date     Congenital anomaly of heart valve        Past Surgical History:    No past surgical history on file.     Physical Exam     Patient Vitals for the past 24 hrs:   BP Temp Temp src Pulse Resp SpO2 Weight   05/23/23 2200 123/74 -- -- 59 11 95 % --   05/23/23 2145 122/77 -- -- 60 13 96 % --   05/23/23 2130 130/86 -- -- 63 16 94 % --   05/23/23 2115 122/76 -- -- 63 14 95 % --   05/23/23 2000 137/78 -- -- 57 12 97 % --   05/23/23 1945 135/84 -- -- 59 (!) 6 96 % --   05/23/23 1930 132/80 -- -- 58 (!) 8 97 % --   05/23/23 1830 (!) 143/91 -- -- 61 16 -- --   05/23/23 1829 (!) 143/85 -- -- 68 18 -- --   05/23/23 1827 (!) 146/98 -- -- 82 -- -- --   05/23/23 1813 -- 98.1  F (36.7  C) Oral -- -- -- --   05/23/23 1808 (!) 149/82 -- -- 68 16 98 % 90.7 kg (200 lb)        Physical Exam  General: Alert and cooperative with exam. Patient in mild distress. Normal mentation.  Head:  Scalp is NC/AT  Eyes:  No scleral icterus, PERRL, EOMI   ENT:  The external nose and  ears are normal. The oropharynx is normal and without erythema; mucus membranes are moist.   Neck:  Normal range of motion without rigidity.  CV:  Regular rate and rhythm  Resp:  Breath sounds are clear bilaterally    Non-labored, no retractions or accessory muscle use  GI:  Abdomen is soft, no distension, no tenderness. No peritoneal signs  MS:  No lower extremity edema   Skin:  Warm and dry, No rash or lesions noted.  Neuro: Oriented x 3. No gross motor deficits.    Strength and sensation grossly intact in all 4 extremities.      Cranial nerves 2-12 intact.    GCS: 15 normal coordination        Emergency Department Course     ECG results from 05/23/23   EKG 12-lead, tracing only     Value    Systolic Blood Pressure     Diastolic Blood Pressure     Ventricular Rate 64    Atrial Rate 64    NM Interval 194    QRS Duration 92        QTc 429    P Axis 59    R AXIS 49    T Axis 59    Interpretation ECG      Sinus rhythm  Cannot rule out Anterior infarct (cited on or before 18-JAN-2021)  Abnormal ECG  When compared with ECG of 18-JAN-2021 15:26,  Nonspecific T wave abnormality no longer evident in Inferior leads  Confirmed by GENERATED REPORT, COMPUTER ([id number]),  Justina Hernandez ([id number]) on 5/23/2023 9:09:40 PM         Imaging:  MR Brain w Contrast Stereotactic   Final Result   IMPRESSION:   1.  There is a large heterogeneously enhancing solid and cystic or necrotic lesion arising from the posterior superior aspect of the left frontal lobe, favored to be intra-axial. Primary differential considerations include high-grade primary brain    neoplasm or metastasis. Moderate associated vasogenic edema.      2.  4 mm ring-enhancing lesion is seen along the ventral base of the infundibulum, nonspecific. This could reflect an additional metastasis. Small plaque-like area of dural-based enhancement seen along the posterior aspect of the falx, nonspecific but    compatible with a meningioma (versus dural metastasis).       3.  2 mm nonenhancing focus posterior aspect of the pituitary likely reflecting a tiny cyst or cystically degenerated adenoma. Suggest further assessment with dedicated MRI of the sella.      MR Brain w/o & w Contrast   Final Result   IMPRESSION:   1.  There is a large heterogeneously enhancing solid and cystic or necrotic lesion arising from the posterior superior aspect of the left frontal lobe, favored to be intra-axial. Primary differential considerations include high-grade primary brain    neoplasm or metastasis. Moderate associated vasogenic edema.      2.  4 mm ring-enhancing lesion is seen along the ventral base of the infundibulum, nonspecific. This could reflect an additional metastasis. Small plaque-like area of dural-based enhancement seen along the posterior aspect of the falx, nonspecific but    compatible with a meningioma (versus dural metastasis).      3.  2 mm nonenhancing focus posterior aspect of the pituitary likely reflecting a tiny cyst or cystically degenerated adenoma. Suggest further assessment with dedicated MRI of the sella.      CTA Head Neck with Contrast   Final Result   IMPRESSION:    HEAD CT:   1.  Cortically-based left parietal lobe tumor with moderate associated vasogenic edema. Recommend further characterization with MRI.   2.  Resultant mass effect includes 5 mm rightward midline shift, partial effacement of the basilar cisterns without transtentorial herniation, partial effacement of the third and left lateral ventricles, and      HEAD CTA:    1.  No large vessel occlusion or stenosis.   2.  Left anterior cerebral artery supplies the hypervascular left parietal lobe tumor.      NECK CTA:   1.  Normal neck CTA.      Findings discussed with Dr. Josue pineal 624 and 6:31 PM on 05/23/2023      CT Head w/o Contrast   Final Result   IMPRESSION:    HEAD CT:   1.  Cortically-based left parietal lobe tumor with moderate associated vasogenic edema. Recommend further  characterization with MRI.   2.  Resultant mass effect includes 5 mm rightward midline shift, partial effacement of the basilar cisterns without transtentorial herniation, partial effacement of the third and left lateral ventricles, and      HEAD CTA:    1.  No large vessel occlusion or stenosis.   2.  Left anterior cerebral artery supplies the hypervascular left parietal lobe tumor.      NECK CTA:   1.  Normal neck CTA.      Findings discussed with Dr. Josue pineal 624 and 6:31 PM on 05/23/2023         Report per radiology    Laboratory:  Labs Ordered and Resulted from Time of ED Arrival to Time of ED Departure   BASIC METABOLIC PANEL - Abnormal       Result Value    Sodium 133 (*)     Potassium 4.2      Chloride 98      Carbon Dioxide (CO2) 24      Anion Gap 11      Urea Nitrogen 16.4      Creatinine 0.98 (*)     Calcium 9.4      Glucose 99      GFR Estimate 65     INR - Normal    INR 1.01     PARTIAL THROMBOPLASTIN TIME - Normal    aPTT 24     TROPONIN T, HIGH SENSITIVITY - Normal    Troponin T, High Sensitivity 7     CBC WITH PLATELETS AND DIFFERENTIAL    WBC Count 5.8      RBC Count 4.45      Hemoglobin 14.7      Hematocrit 41.4      MCV 93      MCH 33.0      MCHC 35.5      RDW 11.7      Platelet Count 199      % Neutrophils 64      % Lymphocytes 23      % Monocytes 11      % Eosinophils 1      % Basophils 1      % Immature Granulocytes 0      NRBCs per 100 WBC 0      Absolute Neutrophils 3.7      Absolute Lymphocytes 1.3      Absolute Monocytes 0.7      Absolute Eosinophils 0.0      Absolute Basophils 0.0      Absolute Immature Granulocytes 0.0      Absolute NRBCs 0.0     GLUCOSE MONITOR NURSING POCT        Emergency Department Course & Assessments:        Interventions:  Medications   levETIRAcetam (KEPPRA) tablet 500 mg (500 mg Oral $Given 5/23/23 1935)   dexamethasone (DECADRON) injection 4 mg (has no administration in time range)   Saline Flush - CT (100 mLs Intravenous $Given 5/23/23 1815)    iopamidol (ISOVUE-370) solution 75 mL (75 mLs Intravenous $Given 5/23/23 1815)   dexamethasone PF (DECADRON) injection 10 mg (10 mg Intravenous $Given 5/23/23 1956)   gadobutrol (GADAVIST) injection 15 mL (15 mLs Intravenous $Given 5/23/23 2052)          Independent Interpretation (X-rays, CTs, rhythm strip):  I reviewed the patient's head CT; large left parietal mass    Consultations/Discussion of Management or Tests:  I discussed the patient's care with the stroke neurology service; recommendation for 500 mg Keppra twice daily and neurosurgery consultation  I discussed the patient's care with BRADY Tinoco with neurosurgery; recommends 10 mg Decadron and brain MRI as above  I discussed the patient's care with Dr. Smith of the hospitalist service accepted for admission.       Social Determinants of Health affecting care:   None    Disposition:  The patient was admitted to the hospital under the care of Dr. Smith.     Impression & Plan      Medical Decision Making:  Patient is a 63-year-old female who presents with intermittent right-sided weakness as well as episode of visual disturbance and coordination difficulty.  Patient's medical history and records reviewed.  Code stroke was initiated from triage and I evaluated the patient after she returned from CT imaging.  Upon my evaluation patient's neurologic symptoms had resolved.  Imaging demonstrated evidence of large left parietal brain mass as noted above with associated vasogenic edema.  The patient's care was discussed with both the stroke neurology service as well as neurosurgery.  Recommendation for brain MRI, Keppra for potential associated seizure activity/prophylaxis, 10 mg Decadron given in the ED, and admission to hospitalist service.  Patient remained stable throughout my care.  Basic labs and EKG without significant acute findings as above.    Diagnosis:    ICD-10-CM    1. Brain mass  G93.89 Case Request: CRANIOTOMY, USING OPTICAL TRACKING SYSTEM,  WITH NEOPLASM EXCISION  STEALTH ASSISTED LEFT FRONTOPARIETAL CRANIOTOMY WITH RESCETION OF BRAIN TUMOR     Case Request: CRANIOTOMY, USING OPTICAL TRACKING SYSTEM, WITH NEOPLASM EXCISION  STEALTH ASSISTED LEFT FRONTOPARIETAL CRANIOTOMY WITH RESCETION OF BRAIN TUMOR              Joselo Bledsoe,   05/23/23 1283

## 2023-05-24 NOTE — CONSULTS
[facility]    Cardiology Consultation     Date of Admission:  5/23/2023    Assessment & Plan     This is a 63 yr old female with PMH significant for VSD and PVCS who is admitted with visual disturbance and right sided weakness found to have a left parietal mass needing craniotomy on Friday. Cardiology consulted for pre operative clearance. EF on my evaluation is normal, 55%.    1. VSD: small by report, I reviewed echocardiogram imaging in detail. The VSD is still quite small. No significant RV dilatation.   -can obtain outpatient cardiac MRI to quantify shunt    2. PVCS: monitor on telemetry   -can initiate beta blocker however prohibitive by sinus bradycardia at present.    3. Aortic stenosis: appears bicuspid, moderate stenosis probable by quantification and visualization     No further cardiac work up is needed prior to necessary craniotomy.     Recommend outpatient follow up in adult congenital.     Will sign off. Please page with questions.     Moderate complexity     Katherin Gilbert MD    Primary Care Physician   Physician No Ref-Primary    Reason for Consult   Pre operative clearance  VSD  PVCs    History of Present Illness     This is a 63 yr old female with VSD, bicuspid valve, PVCs, admitted with intermittent right sided weakness, CT with left parietal lobe mas with brain edema. Patient started on decadron and is undergoing surgery on Friday. Cardiology consulted for congenital history. She has not from what I can gather had echo or CMR recently.    She says she used to follow cardiology ten years ago.    No chest pain, Leg edema, orthopnea, PND, syncope, palpitations. BP controlled and HR normal sinus rhythm 54 bpm.      Past Medical History   Past Medical History:   Diagnosis Date     Congenital anomaly of heart valve      Past Surgical History   No cardiac history     Prior to Admission Medications   None     Current Facility-Administered Medications   Medication Dose Route  Frequency     dexamethasone  4 mg Intravenous Q6H     levETIRAcetam  500 mg Oral BID     sodium chloride (PF)  3 mL Intracatheter Q8H     Current Facility-Administered Medications   Medication Last Rate     sodium chloride 20 mL/hr at 05/24/23 1135     Allergies   No Known Allergies    Social History    reports that she has never smoked. She has never used smokeless tobacco. She reports that she does not currently use alcohol. She reports that she does not use drugs.    Family History       No cardiac history    Review of Systems   A comprehensive review of system was performed and is negative other than that noted in the HPI or here.     Physical Exam   Vital Signs with Ranges  Temp:  [97.4  F (36.3  C)-98.1  F (36.7  C)] 98.1  F (36.7  C)  Pulse:  [49-82] 54  Resp:  [6-26] 16  BP: (100-149)/(59-98) 113/67  SpO2:  [89 %-98 %] 90 %  Wt Readings from Last 4 Encounters:   05/24/23 89.3 kg (196 lb 13.9 oz)   02/06/10 84.8 kg (187 lb)   08/07/08 90.7 kg (200 lb)     I/O last 3 completed shifts:  In: 2351.67 [P.O.:1380; I.V.:971.67]  Out: 1800 [Urine:1800]      Vitals: /67   Pulse 54   Temp 98.1  F (36.7  C) (Oral)   Resp 16   Wt 89.3 kg (196 lb 13.9 oz)   SpO2 90%     Physical Exam:   General - Alert and oriented to time place and person in no acute distress  Eyes - No scleral icterus  HEENT - Neck supple, moist mucous membranes  Cardiovascular - RRR, holosystolic murmur  Extremities - There is no edema  Respiratory - CTAB  Skin - No pallor or cyanosis  Gastrointestinal - Non tender and non distended without rebound or guarding  Psych - Appropriate affect   Neurological - No gross motor neurological focal deficits    No lab results found in last 7 days.    Invalid input(s): TROPONINIES    Recent Labs   Lab 05/24/23  1326 05/24/23  0452 05/24/23  0118 05/23/23  1813   WBC  --  4.3  --  5.8   HGB  --  14.4  --  14.7   MCV  --  93  --  93   PLT  --  207  --  199   INR  --   --   --  1.01   NA  --  136  --  133*    POTASSIUM  --  4.3  --  4.2   CHLORIDE  --  102  --  98   CO2  --  23  --  24   BUN  --  14.3  --  16.4   CR  --  0.84  --  0.98*   GFRESTIMATED  --  78  --  65   ANIONGAP  --  11  --  11   QUAN  --  9.7  --  9.4   * 126* 116* 99     No results for input(s): CHOL, HDL, LDL, TRIG, CHOLHDLRATIO in the last 92802 hours.  Recent Labs   Lab 05/24/23  0452 05/23/23  1813   WBC 4.3 5.8   HGB 14.4 14.7   HCT 41.8 41.4   MCV 93 93    199     No results for input(s): PH, PHV, PO2, PO2V, SAT, PCO2, PCO2V, HCO3, HCO3V in the last 168 hours.  No results for input(s): NTBNPI, NTBNP in the last 168 hours.  No results for input(s): DD in the last 168 hours.  No results for input(s): SED, CRP in the last 168 hours.  Recent Labs   Lab 05/24/23  0452 05/23/23 1813    199     No results for input(s): TSH in the last 168 hours.  No results for input(s): COLOR, APPEARANCE, URINEGLC, URINEBILI, URINEKETONE, SG, UBLD, URINEPH, PROTEIN, UROBILINOGEN, NITRITE, LEUKEST, RBCU, WBCU in the last 168 hours.    Imaging:  Recent Results (from the past 48 hour(s))   CT Head w/o Contrast    Narrative    EXAM: CT HEAD W/O CONTRAST, CTA HEAD NECK W CONTRAST  LOCATION: [facility]  DATE/TIME: 5/23/2023 6:20 PM CDT    INDICATION: Right-sided weakness. Visual changes Code Stroke to evaluate for potential thrombolysis and thrombectomy. PLEASE READ IMMEDIATELY.  COMPARISON: None.  CONTRAST: 75 mL isovue 370  TECHNIQUE: Head and neck CT angiogram with IV contrast. Noncontrast head CT followed by axial helical CT images of the head and neck vessels obtained during the arterial phase of intravenous contrast administration. Axial 2D reconstructed images and   multiplanar 3D MIP reconstructed images of the head and neck vessels were performed by the technologist. Dose reduction techniques were used. All stenosis measurements made according to NASCET criteria unless otherwise specified.    FINDINGS:   NONCONTRAST  HEAD CT:   INTRACRANIAL CONTENTS: Large cortically based hypoattenuating mass within the left parietal lobe measures 52 mm AP x 47 mm TV x 42 mm CC. Moderate anterior adjacent vasogenic edema which extends into the corpus callosum splenium. Mass effect includes   local sulcal effacement. Partial effacement of the left lateral oral and third ventricle. Rightward midline shift measures 5 mm. Partial effacement of the basilar cisterns. No downward transtentorial herniation. No intracranial hemorrhage or abnormal   extraaxial collection.  No CT evidence of acute infarct. No additional abnormal brain parenchymal attenuation.      VISUALIZED ORBITS/SINUSES/MASTOIDS: No intraorbital abnormality. No paranasal sinus mucosal disease. No middle ear or mastoid effusion.    BONES/SOFT TISSUES: No acute abnormality.    HEAD CTA:  ANTERIOR CIRCULATION: No stenosis or occlusion. No aneurysm or high flow vascular malformation. Hypertrophied branch of the left anterior cerebral artery supplies the very vascular left parietal lobe tumor. Standard [place] of [place] anatomy.    POSTERIOR CIRCULATION: No stenosis/occlusion, aneurysm, or high flow vascular malformation. Balanced vertebral arteries supply a normal basilar artery.     DURAL VENOUS SINUSES: Expected enhancement of the major dural venous sinuses.    NECK CTA:  RIGHT CAROTID: No measurable stenosis or dissection.    LEFT CAROTID: No measurable stenosis or dissection.    VERTEBRAL ARTERIES: No focal stenosis or dissection. Balanced vertebral arteries.    AORTIC ARCH: Classic aortic arch anatomy with no significant stenosis at the origin of the great vessels.    NONVASCULAR STRUCTURES: Unremarkable.      Impression    IMPRESSION:   HEAD CT:  1.  Cortically-based left parietal lobe tumor with moderate associated vasogenic edema. Recommend further characterization with MRI.  2.  Resultant mass effect includes 5 mm rightward midline shift, partial effacement of the basilar cisterns  without transtentorial herniation, partial effacement of the third and left lateral ventricles, and    HEAD CTA:   1.  No large vessel occlusion or stenosis.  2.  Left anterior cerebral artery supplies the hypervascular left parietal lobe tumor.    NECK CTA:  1.  Normal neck CTA.    Findings discussed with Dr. Josue pineal 624 and 6:31 PM on 05/23/2023   CTA Head Neck with Contrast    Narrative    EXAM: CT HEAD W/O CONTRAST, CTA HEAD NECK W CONTRAST  LOCATION: [facility]  DATE/TIME: 5/23/2023 6:20 PM CDT    INDICATION: Right-sided weakness. Visual changes Code Stroke to evaluate for potential thrombolysis and thrombectomy. PLEASE READ IMMEDIATELY.  COMPARISON: None.  CONTRAST: 75 mL isovue 370  TECHNIQUE: Head and neck CT angiogram with IV contrast. Noncontrast head CT followed by axial helical CT images of the head and neck vessels obtained during the arterial phase of intravenous contrast administration. Axial 2D reconstructed images and   multiplanar 3D MIP reconstructed images of the head and neck vessels were performed by the technologist. Dose reduction techniques were used. All stenosis measurements made according to NASCET criteria unless otherwise specified.    FINDINGS:   NONCONTRAST HEAD CT:   INTRACRANIAL CONTENTS: Large cortically based hypoattenuating mass within the left parietal lobe measures 52 mm AP x 47 mm TV x 42 mm CC. Moderate anterior adjacent vasogenic edema which extends into the corpus callosum splenium. Mass effect includes   local sulcal effacement. Partial effacement of the left lateral oral and third ventricle. Rightward midline shift measures 5 mm. Partial effacement of the basilar cisterns. No downward transtentorial herniation. No intracranial hemorrhage or abnormal   extraaxial collection.  No CT evidence of acute infarct. No additional abnormal brain parenchymal attenuation.      VISUALIZED ORBITS/SINUSES/MASTOIDS: No intraorbital abnormality. No  paranasal sinus mucosal disease. No middle ear or mastoid effusion.    BONES/SOFT TISSUES: No acute abnormality.    HEAD CTA:  ANTERIOR CIRCULATION: No stenosis or occlusion. No aneurysm or high flow vascular malformation. Hypertrophied branch of the left anterior cerebral artery supplies the very vascular left parietal lobe tumor. Standard [place] of [place] anatomy.    POSTERIOR CIRCULATION: No stenosis/occlusion, aneurysm, or high flow vascular malformation. Balanced vertebral arteries supply a normal basilar artery.     DURAL VENOUS SINUSES: Expected enhancement of the major dural venous sinuses.    NECK CTA:  RIGHT CAROTID: No measurable stenosis or dissection.    LEFT CAROTID: No measurable stenosis or dissection.    VERTEBRAL ARTERIES: No focal stenosis or dissection. Balanced vertebral arteries.    AORTIC ARCH: Classic aortic arch anatomy with no significant stenosis at the origin of the great vessels.    NONVASCULAR STRUCTURES: Unremarkable.      Impression    IMPRESSION:   HEAD CT:  1.  Cortically-based left parietal lobe tumor with moderate associated vasogenic edema. Recommend further characterization with MRI.  2.  Resultant mass effect includes 5 mm rightward midline shift, partial effacement of the basilar cisterns without transtentorial herniation, partial effacement of the third and left lateral ventricles, and    HEAD CTA:   1.  No large vessel occlusion or stenosis.  2.  Left anterior cerebral artery supplies the hypervascular left parietal lobe tumor.    NECK CTA:  1.  Normal neck CTA.    Findings discussed with Dr. Josue pineal 624 and 6:31 PM on 05/23/2023   MR Brain w/o & w Contrast    Narrative    EXAM: MR BRAIN W/O and W CONTRAST, MR BRAIN W CONTRAST STEREOTACTIC  LOCATION: [facility]  DATE/TIME: 5/23/2023 8:55 PM CDT    INDICATION: New left-sided brain mass on CT.  COMPARISON: CT brain 5/23/2023. MRI brain 5/23/2023.  CONTRAST: 15 mL Gadavist  TECHNIQUE:  Multiplanar multisequence head MRI without and with intravenous contrast with dedicated surgical/therapy planning sequences.    FINDINGS:  INTRACRANIAL CONTENTS: There is a large heterogeneous enhancing left parietal mass measuring approximately 6.4 x 4.3 x 4.5 cm in maximal dimensions. The lesion is favored to be cortically based intra-axial and demonstrates areas of heterogeneous   enhancement and areas of internal T2 prolongation and enhancement compatible with cystic change or central necrosis. Enhancement pattern, however, suggested this may be extra-axial and dural-based with areas of thicker enhancement seen along the parietal   inner table radiating centrally from the inner table. Several prominent flow voids are seen along the surface of the lesion. Moderate FLAIR hyperintensity compatible with vasogenic edema is seen within the more ventral left parietal and posterior left   frontal lobes. There is a second plaque-like area of abnormal dural enhancement arising from the posterior falx on the left, nonspecific but compatible with a small meningioma (axial image 21 of series 13, sagittal image 98 of series 14).    Left-sided mass effect with approximately 6 mm left to right midline shift measured at the mid septum pellucidum. Effacement of the atrium of left lateral ventricle. No dilatation of the left temporal horn. Lateral and third ventricles otherwise normal   in size. Cerebellar tonsils are normally positioned. Visualized upper cervical spinal cord and corpus callosum are unremarkable. Major skull base vascular flow-voids are preserved.    Although not well visualized on the current exam, note is made of a 4 mm rim-enhancing lesion along the caudal ventral margin of the infundibulum/superior margin of the pituitary gland (sagittal postcontrast image 98). In addition, within the posterior   aspect of the pituitary is a small 2 mm nonenhancing focus. Correlation with dedicated MRI of the sella is  suggested for further assessment.    OSSEOUS STRUCTURES/SOFT TISSUES: Visualized marrow space is unremarkable. Specifically, no abnormal signal is seen within the right parietal calvarium.    ORBITS: No abnormality accounting for technique.     SINUSES/MASTOIDS: Mild bilateral maxillary sinus mucosal thickening. Minimal fluid or mucosal thickening is seen within both mastoid sinuses.      Impression    IMPRESSION:  1.  There is a large heterogeneously enhancing solid and cystic or necrotic lesion arising from the posterior superior aspect of the left frontal lobe, favored to be intra-axial. Primary differential considerations include high-grade primary brain   neoplasm or metastasis. Moderate associated vasogenic edema.    2.  4 mm ring-enhancing lesion is seen along the ventral base of the infundibulum, nonspecific. This could reflect an additional metastasis. Small plaque-like area of dural-based enhancement seen along the posterior aspect of the falx, nonspecific but   compatible with a meningioma (versus dural metastasis).    3.  2 mm nonenhancing focus posterior aspect of the pituitary likely reflecting a tiny cyst or cystically degenerated adenoma. Suggest further assessment with dedicated MRI of the sella.   MR Brain w Contrast Stereotactic    Narrative    EXAM: MR BRAIN W/O and W CONTRAST, MR BRAIN W CONTRAST STEREOTACTIC  LOCATION: [facility]  DATE/TIME: 5/23/2023 8:55 PM CDT    INDICATION: New left-sided brain mass on CT.  COMPARISON: CT brain 5/23/2023. MRI brain 5/23/2023.  CONTRAST: 15 mL Gadavist  TECHNIQUE: Multiplanar multisequence head MRI without and with intravenous contrast with dedicated surgical/therapy planning sequences.    FINDINGS:  INTRACRANIAL CONTENTS: There is a large heterogeneous enhancing left parietal mass measuring approximately 6.4 x 4.3 x 4.5 cm in maximal dimensions. The lesion is favored to be cortically based intra-axial and demonstrates areas of  heterogeneous   enhancement and areas of internal T2 prolongation and enhancement compatible with cystic change or central necrosis. Enhancement pattern, however, suggested this may be extra-axial and dural-based with areas of thicker enhancement seen along the parietal   inner table radiating centrally from the inner table. Several prominent flow voids are seen along the surface of the lesion. Moderate FLAIR hyperintensity compatible with vasogenic edema is seen within the more ventral left parietal and posterior left   frontal lobes. There is a second plaque-like area of abnormal dural enhancement arising from the posterior falx on the left, nonspecific but compatible with a small meningioma (axial image 21 of series 13, sagittal image 98 of series 14).    Left-sided mass effect with approximately 6 mm left to right midline shift measured at the mid septum pellucidum. Effacement of the atrium of left lateral ventricle. No dilatation of the left temporal horn. Lateral and third ventricles otherwise normal   in size. Cerebellar tonsils are normally positioned. Visualized upper cervical spinal cord and corpus callosum are unremarkable. Major skull base vascular flow-voids are preserved.    Although not well visualized on the current exam, note is made of a 4 mm rim-enhancing lesion along the caudal ventral margin of the infundibulum/superior margin of the pituitary gland (sagittal postcontrast image 98). In addition, within the posterior   aspect of the pituitary is a small 2 mm nonenhancing focus. Correlation with dedicated MRI of the sella is suggested for further assessment.    OSSEOUS STRUCTURES/SOFT TISSUES: Visualized marrow space is unremarkable. Specifically, no abnormal signal is seen within the right parietal calvarium.    ORBITS: No abnormality accounting for technique.     SINUSES/MASTOIDS: Mild bilateral maxillary sinus mucosal thickening. Minimal fluid or mucosal thickening is seen within both mastoid  sinuses.      Impression    IMPRESSION:  1.  There is a large heterogeneously enhancing solid and cystic or necrotic lesion arising from the posterior superior aspect of the left frontal lobe, favored to be intra-axial. Primary differential considerations include high-grade primary brain   neoplasm or metastasis. Moderate associated vasogenic edema.    2.  4 mm ring-enhancing lesion is seen along the ventral base of the infundibulum, nonspecific. This could reflect an additional metastasis. Small plaque-like area of dural-based enhancement seen along the posterior aspect of the falx, nonspecific but   compatible with a meningioma (versus dural metastasis).    3.  2 mm nonenhancing focus posterior aspect of the pituitary likely reflecting a tiny cyst or cystically degenerated adenoma. Suggest further assessment with dedicated MRI of the sella.   CT Chest/Abdomen/Pelvis w Contrast    Narrative    CT CHEST/ABDOMEN/PELVIS WITH CONTRAST 5/24/2023 9:38 AM    CLINICAL HISTORY: Brain tumor, evaluate for primary.    TECHNIQUE: CT scan of the chest, abdomen, and pelvis was performed  following injection of IV contrast. Multiplanar reformats were  obtained. Dose reduction techniques were used.   CONTRAST: 99mL Isovue-370    COMPARISON: None.    FINDINGS:   LUNGS AND PLEURA: Mild peripheral atelectasis and/or fibrosis. No  pulmonary masses or effusions.    MEDIASTINUM/AXILLAE: No adenopathy or aneurysm.    CORONARY ARTERY CALCIFICATIONS: None.    HEPATOBILIARY: No significant mass or bile duct dilatation. No  calcified gallstones.     PANCREAS: No significant mass, duct dilatation, or inflammatory  change.    SPLEEN: Normal size.    ADRENAL GLANDS: No significant nodules.    KIDNEYS/BLADDER: No significant mass, stones, or hydronephrosis.    BOWEL: No obstruction or inflammatory change.    PELVIC ORGANS: No pelvic masses.    ADDITIONAL FINDINGS: No adenopathy or ascites.    MUSCULOSKELETAL: No frankly destructive bony  lesions.      Impression    IMPRESSION: No occult malignancy demonstrated.     ANTONIO ROLLE MD         SYSTEM ID:  [id number]       Echo:  No results found for this or any previous visit (from the past 4320 hour(s)).    Clinically Significant Risk Factors Present on Admission                             Chronic Fatigue and Other Debilities: Other reduced mobility

## 2023-05-24 NOTE — ED NOTES
Code stroke called from triage at 1828. Pt to ct from triage .   Neuros intact at CT scan .  Pt taken back to [facility]   de-escalated code stroke at 1833. MRI check list has chaim faxed . Pt to receive Kepra.    bedside and updated. Report given to oncoming RN.

## 2023-05-24 NOTE — PROVIDER NOTIFICATION
Reported to Hospitalist JANET Bowles MD  That the pt's HR sustained low to mid 50s. while awake. Whit occasional Hr dip to the mid 40s.  BP remains WNL. No neuro deficit noted, no dizziness reported.  MD ordered to continue monitoring at this time

## 2023-05-24 NOTE — PROGRESS NOTES
[facility]    Medicine Progress Note - Hospitalist Service    Date of Admission:  5/23/2023    Assessment & Plan   Sarah Frost is a 63 year old female admitted on 5/23/2023 with intermittent right sided weakness. CT imaging found left parietal lobe mass with moderate vasogenic edema     Left Parietal mass with moderate vasogenic edema  ? Intermittent R sided weakness secondary to edema vs focal seizures related to mass  * CT head: cortically based left parietal lobe tumor with moderate associated vasogenic edema. Mass effect includes 5mm rightward midline shift, partial effacement of basilar cisterns without transtentorial herniation, partial effacement of the third and left lateral ventricles.  * CTA head/neck: no prox vessel occlusion or significant stenosis  * MRI brain-large large heterogeneously enhancing solid and cystic or necrotic lesion arising from the posterior superior aspect of the left frontal lobe, favored to be intra-axial. Primary differential considerations include high-grade primary brain neoplasm or metastasis. Moderate associated vasogenic edema.2.  4 mm ring-enhancing lesion is seen along the ventral base of the infundibulum, nonspecific. This could reflect an additional metastasis. Small plaque-like area of dural-based enhancement seen along the posterior aspect of the falx, nonspecific but  compatible with a meningioma (versus dural metastasis). 3.  2 mm nonenhancing focus posterior aspect of the pituitary likely reflecting a tiny cyst or cystically degenerated adenoma. Suggest further assessment with dedicated MRI of the sella.  * received decadron 10mg in ED    - continue with decadron 4mg q6h  - keppra 500mg bid  - EEG  - CT chest/abd/pelvis pending  - q2hr neuro checks  - Neurocritical care consult placed since patient in ICU at this time  - neurosurgery following     Hx VSD and PVCs  - Monitor on telemetry x24h       Diet: Combination Diet Regular Diet Adult     DVT Prophylaxis: Pneumatic Compression Devices  Lea Catheter: Not present  Lines: None     Cardiac Monitoring: ACTIVE order. Indication: palpitations  Code Status: Full Code      Clinically Significant Risk Factors Present on Admission                                Disposition Plan  likely 4-5 days pending above management     Expected Discharge Date: 05/25/2023                  Siva Mi MD  Hospitalist Service  [facility]  Securely message with [employer] (more info)  Text page via [employer] Paging/Directory   ______________________________________________________________________    Interval History   Patient reports she currently does not have right sided weakness or visual issues.  She denies headache. Denies n/v or abdominal pain. She is afebrile.     Physical Exam   Vital Signs: Temp: 97.4  F (36.3  C) Temp src: Oral BP: 104/68 Pulse: 59   Resp: 16 SpO2: 96 % O2 Device: None (Room air)    Weight: 196 lbs 13.93 oz    General Appearance: Alert, awake and no apparent distress  Respiratory: clear to auscultation bilaterally  Cardiovascular: regular rate and rhythm  GI: soft and non-tender  Skin: warm and dry  Other: Alert and oriented, no facial asymmetry noted, no facial droop.     Medical Decision Making       45 MINUTES SPENT BY ME on the date of service doing chart review, history, exam, documentation & further activities per the note.  MANAGEMENT DISCUSSED with the following over the past 24 hours: patient, spouse, RN   NOTE(S)/MEDICAL RECORDS REVIEWED over the past 24 hours: neurology, neurosurgery note  Tests ORDERED & REVIEWED in the past 24 hours:  - BMP  - CBC  - Magnesium  - Phosphorus  Tests personally interpreted in the past 24 hours:  - brain MRI and head CT showing as above      Data     I have personally reviewed the following data over the past 24 hrs:    4.3  \   14.4   / 207     136 102 14.3 /  126 (H)   4.3 23 0.84 \       Trop: 7 BNP: N/A       INR:  1.01  PTT:  24   D-dimer:  N/A Fibrinogen:  N/A       Imaging results reviewed over the past 24 hrs:   Recent Results (from the past 24 hour(s))   CT Head w/o Contrast    Narrative    EXAM: CT HEAD W/O CONTRAST, CTA HEAD NECK W CONTRAST  LOCATION: [facility]  DATE/TIME: 5/23/2023 6:20 PM CDT    INDICATION: Right-sided weakness. Visual changes Code Stroke to evaluate for potential thrombolysis and thrombectomy. PLEASE READ IMMEDIATELY.  COMPARISON: None.  CONTRAST: 75 mL isovue 370  TECHNIQUE: Head and neck CT angiogram with IV contrast. Noncontrast head CT followed by axial helical CT images of the head and neck vessels obtained during the arterial phase of intravenous contrast administration. Axial 2D reconstructed images and   multiplanar 3D MIP reconstructed images of the head and neck vessels were performed by the technologist. Dose reduction techniques were used. All stenosis measurements made according to NASCET criteria unless otherwise specified.    FINDINGS:   NONCONTRAST HEAD CT:   INTRACRANIAL CONTENTS: Large cortically based hypoattenuating mass within the left parietal lobe measures 52 mm AP x 47 mm TV x 42 mm CC. Moderate anterior adjacent vasogenic edema which extends into the corpus callosum splenium. Mass effect includes   local sulcal effacement. Partial effacement of the left lateral oral and third ventricle. Rightward midline shift measures 5 mm. Partial effacement of the basilar cisterns. No downward transtentorial herniation. No intracranial hemorrhage or abnormal   extraaxial collection.  No CT evidence of acute infarct. No additional abnormal brain parenchymal attenuation.      VISUALIZED ORBITS/SINUSES/MASTOIDS: No intraorbital abnormality. No paranasal sinus mucosal disease. No middle ear or mastoid effusion.    BONES/SOFT TISSUES: No acute abnormality.    HEAD CTA:  ANTERIOR CIRCULATION: No stenosis or occlusion. No aneurysm or high flow vascular malformation.  Hypertrophied branch of the left anterior cerebral artery supplies the very vascular left parietal lobe tumor. Standard [place] of [place]elroy anatomy.    POSTERIOR CIRCULATION: No stenosis/occlusion, aneurysm, or high flow vascular malformation. Balanced vertebral arteries supply a normal basilar artery.     DURAL VENOUS SINUSES: Expected enhancement of the major dural venous sinuses.    NECK CTA:  RIGHT CAROTID: No measurable stenosis or dissection.    LEFT CAROTID: No measurable stenosis or dissection.    VERTEBRAL ARTERIES: No focal stenosis or dissection. Balanced vertebral arteries.    AORTIC ARCH: Classic aortic arch anatomy with no significant stenosis at the origin of the great vessels.    NONVASCULAR STRUCTURES: Unremarkable.      Impression    IMPRESSION:   HEAD CT:  1.  Cortically-based left parietal lobe tumor with moderate associated vasogenic edema. Recommend further characterization with MRI.  2.  Resultant mass effect includes 5 mm rightward midline shift, partial effacement of the basilar cisterns without transtentorial herniation, partial effacement of the third and left lateral ventricles, and    HEAD CTA:   1.  No large vessel occlusion or stenosis.  2.  Left anterior cerebral artery supplies the hypervascular left parietal lobe tumor.    NECK CTA:  1.  Normal neck CTA.    Findings discussed with Dr. Josue pineal 624 and 6:31 PM on 05/23/2023   CTA Head Neck with Contrast    Narrative    EXAM: CT HEAD W/O CONTRAST, CTA HEAD NECK W CONTRAST  LOCATION: [facility]  DATE/TIME: 5/23/2023 6:20 PM CDT    INDICATION: Right-sided weakness. Visual changes Code Stroke to evaluate for potential thrombolysis and thrombectomy. PLEASE READ IMMEDIATELY.  COMPARISON: None.  CONTRAST: 75 mL isovue 370  TECHNIQUE: Head and neck CT angiogram with IV contrast. Noncontrast head CT followed by axial helical CT images of the head and neck vessels obtained during the arterial phase of  intravenous contrast administration. Axial 2D reconstructed images and   multiplanar 3D MIP reconstructed images of the head and neck vessels were performed by the technologist. Dose reduction techniques were used. All stenosis measurements made according to NASCET criteria unless otherwise specified.    FINDINGS:   NONCONTRAST HEAD CT:   INTRACRANIAL CONTENTS: Large cortically based hypoattenuating mass within the left parietal lobe measures 52 mm AP x 47 mm TV x 42 mm CC. Moderate anterior adjacent vasogenic edema which extends into the corpus callosum splenium. Mass effect includes   local sulcal effacement. Partial effacement of the left lateral oral and third ventricle. Rightward midline shift measures 5 mm. Partial effacement of the basilar cisterns. No downward transtentorial herniation. No intracranial hemorrhage or abnormal   extraaxial collection.  No CT evidence of acute infarct. No additional abnormal brain parenchymal attenuation.      VISUALIZED ORBITS/SINUSES/MASTOIDS: No intraorbital abnormality. No paranasal sinus mucosal disease. No middle ear or mastoid effusion.    BONES/SOFT TISSUES: No acute abnormality.    HEAD CTA:  ANTERIOR CIRCULATION: No stenosis or occlusion. No aneurysm or high flow vascular malformation. Hypertrophied branch of the left anterior cerebral artery supplies the very vascular left parietal lobe tumor. Standard [place] of Mcelroy anatomy.    POSTERIOR CIRCULATION: No stenosis/occlusion, aneurysm, or high flow vascular malformation. Balanced vertebral arteries supply a normal basilar artery.     DURAL VENOUS SINUSES: Expected enhancement of the major dural venous sinuses.    NECK CTA:  RIGHT CAROTID: No measurable stenosis or dissection.    LEFT CAROTID: No measurable stenosis or dissection.    VERTEBRAL ARTERIES: No focal stenosis or dissection. Balanced vertebral arteries.    AORTIC ARCH: Classic aortic arch anatomy with no significant stenosis at the origin of the great  vessels.    NONVASCULAR STRUCTURES: Unremarkable.      Impression    IMPRESSION:   HEAD CT:  1.  Cortically-based left parietal lobe tumor with moderate associated vasogenic edema. Recommend further characterization with MRI.  2.  Resultant mass effect includes 5 mm rightward midline shift, partial effacement of the basilar cisterns without transtentorial herniation, partial effacement of the third and left lateral ventricles, and    HEAD CTA:   1.  No large vessel occlusion or stenosis.  2.  Left anterior cerebral artery supplies the hypervascular left parietal lobe tumor.    NECK CTA:  1.  Normal neck CTA.    Findings discussed with Dr. Josue pineal 624 and 6:31 PM on 05/23/2023   MR Brain w/o & w Contrast    Narrative    EXAM: MR BRAIN W/O and W CONTRAST, MR BRAIN W CONTRAST STEREOTACTIC  LOCATION: [facility]  DATE/TIME: 5/23/2023 8:55 PM CDT    INDICATION: New left-sided brain mass on CT.  COMPARISON: CT brain 5/23/2023. MRI brain 5/23/2023.  CONTRAST: 15 mL Gadavist  TECHNIQUE: Multiplanar multisequence head MRI without and with intravenous contrast with dedicated surgical/therapy planning sequences.    FINDINGS:  INTRACRANIAL CONTENTS: There is a large heterogeneous enhancing left parietal mass measuring approximately 6.4 x 4.3 x 4.5 cm in maximal dimensions. The lesion is favored to be cortically based intra-axial and demonstrates areas of heterogeneous   enhancement and areas of internal T2 prolongation and enhancement compatible with cystic change or central necrosis. Enhancement pattern, however, suggested this may be extra-axial and dural-based with areas of thicker enhancement seen along the parietal   inner table radiating centrally from the inner table. Several prominent flow voids are seen along the surface of the lesion. Moderate FLAIR hyperintensity compatible with vasogenic edema is seen within the more ventral left parietal and posterior left   frontal lobes. There  is a second plaque-like area of abnormal dural enhancement arising from the posterior falx on the left, nonspecific but compatible with a small meningioma (axial image 21 of series 13, sagittal image 98 of series 14).    Left-sided mass effect with approximately 6 mm left to right midline shift measured at the mid septum pellucidum. Effacement of the atrium of left lateral ventricle. No dilatation of the left temporal horn. Lateral and third ventricles otherwise normal   in size. Cerebellar tonsils are normally positioned. Visualized upper cervical spinal cord and corpus callosum are unremarkable. Major skull base vascular flow-voids are preserved.    Although not well visualized on the current exam, note is made of a 4 mm rim-enhancing lesion along the caudal ventral margin of the infundibulum/superior margin of the pituitary gland (sagittal postcontrast image 98). In addition, within the posterior   aspect of the pituitary is a small 2 mm nonenhancing focus. Correlation with dedicated MRI of the sella is suggested for further assessment.    OSSEOUS STRUCTURES/SOFT TISSUES: Visualized marrow space is unremarkable. Specifically, no abnormal signal is seen within the right parietal calvarium.    ORBITS: No abnormality accounting for technique.     SINUSES/MASTOIDS: Mild bilateral maxillary sinus mucosal thickening. Minimal fluid or mucosal thickening is seen within both mastoid sinuses.      Impression    IMPRESSION:  1.  There is a large heterogeneously enhancing solid and cystic or necrotic lesion arising from the posterior superior aspect of the left frontal lobe, favored to be intra-axial. Primary differential considerations include high-grade primary brain   neoplasm or metastasis. Moderate associated vasogenic edema.    2.  4 mm ring-enhancing lesion is seen along the ventral base of the infundibulum, nonspecific. This could reflect an additional metastasis. Small plaque-like area of dural-based enhancement  seen along the posterior aspect of the falx, nonspecific but   compatible with a meningioma (versus dural metastasis).    3.  2 mm nonenhancing focus posterior aspect of the pituitary likely reflecting a tiny cyst or cystically degenerated adenoma. Suggest further assessment with dedicated MRI of the sella.   MR Brain w Contrast Stereotactic    Narrative    EXAM: MR BRAIN W/O and W CONTRAST, MR BRAIN W CONTRAST STEREOTACTIC  LOCATION: [facility]  DATE/TIME: 5/23/2023 8:55 PM CDT    INDICATION: New left-sided brain mass on CT.  COMPARISON: CT brain 5/23/2023. MRI brain 5/23/2023.  CONTRAST: 15 mL Gadavist  TECHNIQUE: Multiplanar multisequence head MRI without and with intravenous contrast with dedicated surgical/therapy planning sequences.    FINDINGS:  INTRACRANIAL CONTENTS: There is a large heterogeneous enhancing left parietal mass measuring approximately 6.4 x 4.3 x 4.5 cm in maximal dimensions. The lesion is favored to be cortically based intra-axial and demonstrates areas of heterogeneous   enhancement and areas of internal T2 prolongation and enhancement compatible with cystic change or central necrosis. Enhancement pattern, however, suggested this may be extra-axial and dural-based with areas of thicker enhancement seen along the parietal   inner table radiating centrally from the inner table. Several prominent flow voids are seen along the surface of the lesion. Moderate FLAIR hyperintensity compatible with vasogenic edema is seen within the more ventral left parietal and posterior left   frontal lobes. There is a second plaque-like area of abnormal dural enhancement arising from the posterior falx on the left, nonspecific but compatible with a small meningioma (axial image 21 of series 13, sagittal image 98 of series 14).    Left-sided mass effect with approximately 6 mm left to right midline shift measured at the mid septum pellucidum. Effacement of the atrium of left lateral  ventricle. No dilatation of the left temporal horn. Lateral and third ventricles otherwise normal   in size. Cerebellar tonsils are normally positioned. Visualized upper cervical spinal cord and corpus callosum are unremarkable. Major skull base vascular flow-voids are preserved.    Although not well visualized on the current exam, note is made of a 4 mm rim-enhancing lesion along the caudal ventral margin of the infundibulum/superior margin of the pituitary gland (sagittal postcontrast image 98). In addition, within the posterior   aspect of the pituitary is a small 2 mm nonenhancing focus. Correlation with dedicated MRI of the sella is suggested for further assessment.    OSSEOUS STRUCTURES/SOFT TISSUES: Visualized marrow space is unremarkable. Specifically, no abnormal signal is seen within the right parietal calvarium.    ORBITS: No abnormality accounting for technique.     SINUSES/MASTOIDS: Mild bilateral maxillary sinus mucosal thickening. Minimal fluid or mucosal thickening is seen within both mastoid sinuses.      Impression    IMPRESSION:  1.  There is a large heterogeneously enhancing solid and cystic or necrotic lesion arising from the posterior superior aspect of the left frontal lobe, favored to be intra-axial. Primary differential considerations include high-grade primary brain   neoplasm or metastasis. Moderate associated vasogenic edema.    2.  4 mm ring-enhancing lesion is seen along the ventral base of the infundibulum, nonspecific. This could reflect an additional metastasis. Small plaque-like area of dural-based enhancement seen along the posterior aspect of the falx, nonspecific but   compatible with a meningioma (versus dural metastasis).    3.  2 mm nonenhancing focus posterior aspect of the pituitary likely reflecting a tiny cyst or cystically degenerated adenoma. Suggest further assessment with dedicated MRI of the sella.

## 2023-05-24 NOTE — PROGRESS NOTES
Contacted regarding 63 to female presenting to ER with right sided weakness over last few days.  Head CT reveals probable tumor.    Head CT:  IMPRESSION:   HEAD CT:  1.  Cortically-based left parietal lobe tumor with moderate associated vasogenic edema. Recommend further characterization with MRI.  2.  Resultant mass effect includes 5 mm rightward midline shift, partial effacement of the basilar cisterns without transtentorial herniation, partial effacement of the third and left lateral ventricles, and     RECOMMENDATIONS:  Admit to neuro floor.  Every 4 hour neuro checks.  Keppra per Neurology.  Decadron 10 mg now followed by 4 mg IV every 6 hours.  Brain MRI with stealth (ordered)  CT C/A/P to evaluate for primary.      ADDENDUM:  Large intracranial lesion.  Plan for ICU status with every 2 hour neuro checks overnight.     Discussed with Dr. Porfirio Machado, MPAS  [facility]  [address]  [address] [place] [id number]    Tel [phone]  Pager [phone]

## 2023-05-24 NOTE — CONSULTS
Consult Date: 05/23/2023    IMPRESSION:  A 63-year-old female presenting with episode of right sided weakness and visual disturbance, which has now resolved with imaging findings revealing a left parietal mass measuring 6.4 x 4.3 x 4.5 centimeters with vasogenic edema.  Differential including primary brain tumor versus metastatic disease.    PLAN:  From the neurosurgical standpoint, would recommend her to be admitted to the intensive care unit with every 2 hour neuro checks overnight.  We will also get a CT chest, abdomen and pelvis tomorrow.  Start her on Decadron 4 milligrams IV every 6 hours and she received 10 milligrams in the Emergency Room.  She will require surgical intervention for this lesion and will solidify timing of the surgery during the week, but likely plan will be for Friday to be operated with Dr. Monroy on 05/26.      TYPE OF VISIT:  The neurosurgical service was consulted see Ms. Frost for brain tumor.    HISTORY OF PRESENT ILLNESS:  Ms. Frost is a 63-year-old right-handed female with a congenital heart valve anomaly, who presents to the Emergency Room with right sided weakness and visual floaters.  She states she has been having right visual field difficulties over the last couple of days, which she attributed to needing new glasses; however, today around 3:30 she had the sudden onset of significant right sided weakness.  She also had a similar episode on Sunday.  She states that it lasted about 15 minutes.  Symptoms have currently resolved.  She denies any headache, weakness, nausea or vomiting currently.  She states she also had a similar situation where she had right sided weakness in 2021 but has been doing fine until this last Sunday.    PAST MEDICAL HISTORY:  Congenital anomaly of the heart valve.    PAST SURGICAL HISTORY:  Negative.    FAMILY HISTORY:  Mother and sister with breast cancer.    MEDICATIONS:  Reviewed per the electronic medical record, not including any  anticoagulation.    ALLERGIES:  NO KNOWN DRUG ALLERGIES.    PHYSICAL EXAMINATION:    VITAL SIGNS:  Temperature is 98.1, blood pressure is 123/74, pulse is 59, respiratory rate is 11.  GENERAL:  She is awake and alert, in no acute distress and pleasant during the examination.  NEUROLOGIC:  She is able to move all 4 extremities well.  She has excellent strength to the upper and lower extremities.  Her [place] coma scale 15.  Her pupils are equal, round, and reactive.  She has a negative pronator drift.    IMAGING STUDIES:  She had a head CT performed in the Emergency Room that showed a left parietal lobe lesion with mass effect and then she had a brain MRI with and without contrast that shows a large enhancing solid and cystic or necrotic lesion arising from the posterior superior aspect of the left frontal lobe.  Considerations are high-grade primary brain neoplasm or metastasis.  Surrounding edema is noted.  There is also a 4 millimeter ring enhancing lesion along the ventral base of the infundibulum, could represent an additional metastatic area.  Also, a tiny cyst appearing focus is noted in the pituitary.      IMPRESSION:  1.  There is a large heterogeneously enhancing solid and cystic or necrotic lesion arising from the posterior superior aspect of the left frontal lobe, favored to be intra-axial. Primary differential considerations include high-grade primary brain   neoplasm or metastasis. Moderate associated vasogenic edema.     2.  4 mm ring-enhancing lesion is seen along the ventral base of the infundibulum, nonspecific. This could reflect an additional metastasis. Small plaque-like area of dural-based enhancement seen along the posterior aspect of the falx, nonspecific but   compatible with a meningioma (versus dural metastasis).     3.  2 mm nonenhancing focus posterior aspect of the pituitary likely reflecting a tiny cyst or cystically degenerated adenoma. Suggest further assessment with dedicated MRI of  the sella.    LABORATORY DATA:  INR is 1.  White count is 5.8.  Sodium is 133.  PTT is 24.      TOTAL TIME SPENT WITH THE PATIENT:  70 minutes, including 50 minutes of counseling and coordination of care.  Discussed with Dr. Monroy.    BRADY KRAMER        D: 2023   T: 2023   MT: kristen    Name:     SUKUMAR NORRIS  MRN:      [id number]        Account:      [id number]   :      1960           Consult Date: 2023     Document: [id number]

## 2023-05-24 NOTE — PROGRESS NOTES
Neurosurgery Daily Progress Note    Assessment & Plan   Procedure(s):  CRANIOTOMY, USING OPTICAL TRACKING SYSTEM, WITH NEOPLASM EXCISION STEALTH ASSISTED LEFT FRONTOPARIETAL CRANIOTOMY WITH RESCETION OF BRAIN TUMOR   63F presented with an episode of right sided weakness and visual disturbance which has now resolved, imaging findings revealing a left parietal mass measuring 6.4 x 4.3 x 4.5 centimeters with vasogenic edema. No malignancy noted on CT chest/abdomen/pelvis. Neuro exam stable today. Patient denies any headaches or new/worsening symptoms.     Plan:  - Continue Decadron 4q6   - Cardiac consult for pre op clearance - history of VSD and PVCs   - Plan for left frontoparietal craniotomy for resection of brain tumor on Friday at 730am with Dr. Monroy  - Appreciate assistance from specialties     Discussed with Dr. Porfirio Gama, CNP  [facility]  [address] [place] [id number]  Tel [phone]  Pager [phone]    Interval History   Stable  Plan for surgery on Friday with Dr. Monroy     Physical Exam   Temp: 98.1  F (36.7  C) Temp src: Oral BP: 124/72 Pulse: 58   Resp: 15 SpO2: 95 % O2 Device: None (Room air)    Vitals:    05/23/23 1808 05/24/23 0100   Weight: 90.7 kg (200 lb) 89.3 kg (196 lb 13.9 oz)       Mental status:  Alert and oriented x 3, speech is fluent.  Cranial nerves:  II-XII intact.   Motor:  Moves all extremities with appropriate strength.   Sensation:  Intact to light touch   Coordination:  Smooth finger to nose testing.   Negative pronator drift.      Medications     sodium chloride 20 mL/hr at 05/24/23 1135        dexamethasone  4 mg Intravenous Q6H     levETIRAcetam  500 mg Oral BID     sodium chloride (PF)  3 mL Intracatheter Q8H

## 2023-05-24 NOTE — H&P
[facility]    History and Physical - Hospitalist Service       Date of Admission:  5/23/2023    Assessment & Plan      Sarah Frost is a 63 year old female admitted on 5/23/2023 with intermittent right sided weakness. CT imaging found left parietal lobe mass with moderate vasogenic edema    Left Parietal mass with moderate vasogenic edema  ? Intermittent R sided weakness secondary to edema vs focal seizures related to mass  * CT head: cortically based left parietal lobe tumor with moderate associated vasogenic edema. Mass effect includes 5mm rightward midline shift, partial effacement of basilar cisterns without transtentorial herniation, partial effacement of the third and left lateral ventricles.  * CTA head/neck: no prox vessel occlusion or significant stenosis  - neurosurgery and gen neuro consults  - q4h neuros  - brain MRI with stealth  - keppra 500mg BID  - EEG in AM  - CT CAP  - decadron 10mg given in ED, continue 4mg q6h    Hx VSD and PVCs  - Monitor on telemetry x24h       Diet: NPO for Medical/Clinical Reasons Except for: Meds    DVT Prophylaxis: Pneumatic Compression Devices  Lea Catheter: Not present  Lines: None     Cardiac Monitoring: None  Code Status:   full code    Clinically Significant Risk Factors Present on Admission                                Disposition Plan      Expected Discharge Date: 05/25/2023                  Mayte Smith DO  Hospitalist Service  [facility]  Securely message with [employer] (more info)  Text page via [employer] Paging/Directory     ______________________________________________________________________    Chief Complaint   Intermittent right sided weakness and visual changes    History is obtained from the patient    History of Present Illness   Sarah Frost is a 63 year old female who presents with intermittent right sided weakness (more prominent in lower extremity than upper) and some floaters in her vision.  Symptoms last few minutes at a time. She thought the visual changes were silent migraines. She reports similar symptoms in 2021 with right sided weakness and palpitations, came to the ED for eval and was discharged with a ziopatch. She did not have formal brain imaging. She then had no recurrence of symptoms until mid April 2023, when she had right sided weakness and some visual floaters. Then on Sunday 5/21 and today 5/23, but symptoms were beginning to last longer (2.5h on day of admit). Symptoms fully resolved while in the ED.  Reviewed results and discussed history of cancers. Mother had breast and lung cancer (smoking hx), sister has breast cancer. Patient is not up to date on mammograms, has never had a colonoscopy. Denies any lumps on self exam. Denies cough, abdominal pain, change in stools.      Past Medical History    Past Medical History:   Diagnosis Date     Congenital anomaly of heart valve        Past Surgical History   No past surgical history on file.    Prior to Admission Medications   None           Physical Exam   Vital Signs: Temp: 98.1  F (36.7  C) Temp src: Oral BP: (!) 143/91 Pulse: 61   Resp: 16 SpO2: 98 % O2 Device: None (Room air)    Weight: 200 lbs 0 oz    Constitutional: Awake, alert, cooperative, no apparent distress.  Eyes: Conjunctiva and pupils examined and normal.  HEENT: Moist mucous membranes, normal dentition.  Respiratory: Clear to auscultation bilaterally, no crackles or wheezing.  Cardiovascular: Regular rate and rhythm, normal S1 and S2, and no murmur noted.  GI: Soft, non-distended, non-tender, normal bowel sounds.  Lymph/Hematologic: No anterior cervical or supraclavicular adenopathy.  Skin: No rashes, no cyanosis, no edema.  Musculoskeletal: No joint swelling, erythema or tenderness.  Neurologic: moves all extremities, follows commands, no drift noted. Able to ambulate normally.  Psychiatric: Alert, oriented to person, place and time, no obvious anxiety or  depression.    Medical Decision Making       75 MINUTES SPENT BY ME on the date of service doing chart review, history, exam, documentation & further activities per the note.      Data     I have personally reviewed the following data over the past 24 hrs:    5.8  \   14.7   / 199     133 (L) 98 16.4 /  99   4.2 24 0.98 (H) \       Trop: 7 BNP: N/A       INR:  1.01 PTT:  24   D-dimer:  N/A Fibrinogen:  N/A       Imaging results reviewed over the past 24 hrs:   Recent Results (from the past 24 hour(s))   CT Head w/o Contrast    Narrative    EXAM: CT HEAD W/O CONTRAST, CTA HEAD NECK W CONTRAST  LOCATION: [facility]  DATE/TIME: 5/23/2023 6:20 PM CDT    INDICATION: Right-sided weakness. Visual changes Code Stroke to evaluate for potential thrombolysis and thrombectomy. PLEASE READ IMMEDIATELY.  COMPARISON: None.  CONTRAST: 75 mL isovue 370  TECHNIQUE: Head and neck CT angiogram with IV contrast. Noncontrast head CT followed by axial helical CT images of the head and neck vessels obtained during the arterial phase of intravenous contrast administration. Axial 2D reconstructed images and   multiplanar 3D MIP reconstructed images of the head and neck vessels were performed by the technologist. Dose reduction techniques were used. All stenosis measurements made according to NASCET criteria unless otherwise specified.    FINDINGS:   NONCONTRAST HEAD CT:   INTRACRANIAL CONTENTS: Large cortically based hypoattenuating mass within the left parietal lobe measures 52 mm AP x 47 mm TV x 42 mm CC. Moderate anterior adjacent vasogenic edema which extends into the corpus callosum splenium. Mass effect includes   local sulcal effacement. Partial effacement of the left lateral oral and third ventricle. Rightward midline shift measures 5 mm. Partial effacement of the basilar cisterns. No downward transtentorial herniation. No intracranial hemorrhage or abnormal   extraaxial collection.  No CT evidence of acute  infarct. No additional abnormal brain parenchymal attenuation.      VISUALIZED ORBITS/SINUSES/MASTOIDS: No intraorbital abnormality. No paranasal sinus mucosal disease. No middle ear or mastoid effusion.    BONES/SOFT TISSUES: No acute abnormality.    HEAD CTA:  ANTERIOR CIRCULATION: No stenosis or occlusion. No aneurysm or high flow vascular malformation. Hypertrophied branch of the left anterior cerebral artery supplies the very vascular left parietal lobe tumor. Standard [place] anatomy.    POSTERIOR CIRCULATION: No stenosis/occlusion, aneurysm, or high flow vascular malformation. Balanced vertebral arteries supply a normal basilar artery.     DURAL VENOUS SINUSES: Expected enhancement of the major dural venous sinuses.    NECK CTA:  RIGHT CAROTID: No measurable stenosis or dissection.    LEFT CAROTID: No measurable stenosis or dissection.    VERTEBRAL ARTERIES: No focal stenosis or dissection. Balanced vertebral arteries.    AORTIC ARCH: Classic aortic arch anatomy with no significant stenosis at the origin of the great vessels.    NONVASCULAR STRUCTURES: Unremarkable.      Impression    IMPRESSION:   HEAD CT:  1.  Cortically-based left parietal lobe tumor with moderate associated vasogenic edema. Recommend further characterization with MRI.  2.  Resultant mass effect includes 5 mm rightward midline shift, partial effacement of the basilar cisterns without transtentorial herniation, partial effacement of the third and left lateral ventricles, and    HEAD CTA:   1.  No large vessel occlusion or stenosis.  2.  Left anterior cerebral artery supplies the hypervascular left parietal lobe tumor.    NECK CTA:  1.  Normal neck CTA.    Findings discussed with Dr. Josue pineal 624 and 6:31 PM on 05/23/2023   CTA Head Neck with Contrast    Narrative    EXAM: CT HEAD W/O CONTRAST, CTA HEAD NECK W CONTRAST  LOCATION: [facility]  DATE/TIME: 5/23/2023 6:20 PM CDT    INDICATION:  Right-sided weakness. Visual changes Code Stroke to evaluate for potential thrombolysis and thrombectomy. PLEASE READ IMMEDIATELY.  COMPARISON: None.  CONTRAST: 75 mL isovue 370  TECHNIQUE: Head and neck CT angiogram with IV contrast. Noncontrast head CT followed by axial helical CT images of the head and neck vessels obtained during the arterial phase of intravenous contrast administration. Axial 2D reconstructed images and   multiplanar 3D MIP reconstructed images of the head and neck vessels were performed by the technologist. Dose reduction techniques were used. All stenosis measurements made according to NASCET criteria unless otherwise specified.    FINDINGS:   NONCONTRAST HEAD CT:   INTRACRANIAL CONTENTS: Large cortically based hypoattenuating mass within the left parietal lobe measures 52 mm AP x 47 mm TV x 42 mm CC. Moderate anterior adjacent vasogenic edema which extends into the corpus callosum splenium. Mass effect includes   local sulcal effacement. Partial effacement of the left lateral oral and third ventricle. Rightward midline shift measures 5 mm. Partial effacement of the basilar cisterns. No downward transtentorial herniation. No intracranial hemorrhage or abnormal   extraaxial collection.  No CT evidence of acute infarct. No additional abnormal brain parenchymal attenuation.      VISUALIZED ORBITS/SINUSES/MASTOIDS: No intraorbital abnormality. No paranasal sinus mucosal disease. No middle ear or mastoid effusion.    BONES/SOFT TISSUES: No acute abnormality.    HEAD CTA:  ANTERIOR CIRCULATION: No stenosis or occlusion. No aneurysm or high flow vascular malformation. Hypertrophied branch of the left anterior cerebral artery supplies the very vascular left parietal lobe tumor. Standard [place] anatomy.    POSTERIOR CIRCULATION: No stenosis/occlusion, aneurysm, or high flow vascular malformation. Balanced vertebral arteries supply a normal basilar artery.     DURAL VENOUS SINUSES: Expected  enhancement of the major dural venous sinuses.    NECK CTA:  RIGHT CAROTID: No measurable stenosis or dissection.    LEFT CAROTID: No measurable stenosis or dissection.    VERTEBRAL ARTERIES: No focal stenosis or dissection. Balanced vertebral arteries.    AORTIC ARCH: Classic aortic arch anatomy with no significant stenosis at the origin of the great vessels.    NONVASCULAR STRUCTURES: Unremarkable.      Impression    IMPRESSION:   HEAD CT:  1.  Cortically-based left parietal lobe tumor with moderate associated vasogenic edema. Recommend further characterization with MRI.  2.  Resultant mass effect includes 5 mm rightward midline shift, partial effacement of the basilar cisterns without transtentorial herniation, partial effacement of the third and left lateral ventricles, and    HEAD CTA:   1.  No large vessel occlusion or stenosis.  2.  Left anterior cerebral artery supplies the hypervascular left parietal lobe tumor.    NECK CTA:  1.  Normal neck CTA.    Findings discussed with Dr. Josue pineal 624 and 6:31 PM on 05/23/2023

## 2023-05-25 ENCOUNTER — APPOINTMENT (OUTPATIENT)
Dept: CT IMAGING | Facility: CLINIC | Age: 63
DRG: 025 | End: 2023-05-25
Attending: INTERNAL MEDICINE
Payer: COMMERCIAL

## 2023-05-25 ENCOUNTER — ANESTHESIA EVENT (OUTPATIENT)
Dept: SURGERY | Facility: CLINIC | Age: 63
DRG: 025 | End: 2023-05-25
Payer: COMMERCIAL

## 2023-05-25 LAB
ABO/RH(D): NORMAL
ANION GAP SERPL CALCULATED.3IONS-SCNC: 15 MMOL/L (ref 7–15)
ANTIBODY SCREEN: NEGATIVE
BLD PROD TYP BPU: NORMAL
BLD PROD TYP BPU: NORMAL
BLOOD COMPONENT TYPE: NORMAL
BLOOD COMPONENT TYPE: NORMAL
BUN SERPL-MCNC: 20.1 MG/DL (ref 8–23)
CALCIUM SERPL-MCNC: 10.3 MG/DL (ref 8.8–10.2)
CHLORIDE SERPL-SCNC: 101 MMOL/L (ref 98–107)
CODING SYSTEM: NORMAL
CODING SYSTEM: NORMAL
CREAT SERPL-MCNC: 0.81 MG/DL (ref 0.51–0.95)
CROSSMATCH: NORMAL
CROSSMATCH: NORMAL
DEPRECATED HCO3 PLAS-SCNC: 19 MMOL/L (ref 22–29)
GFR SERPL CREATININE-BSD FRML MDRD: 81 ML/MIN/1.73M2
GLUCOSE SERPL-MCNC: 126 MG/DL (ref 70–99)
MAGNESIUM SERPL-MCNC: 2.1 MG/DL (ref 1.7–2.3)
PHOSPHATE SERPL-MCNC: 3.4 MG/DL (ref 2.5–4.5)
POTASSIUM SERPL-SCNC: 4.5 MMOL/L (ref 3.4–5.3)
SODIUM SERPL-SCNC: 135 MMOL/L (ref 136–145)
SPECIMEN EXPIRATION DATE: NORMAL
UNIT ABO/RH: NORMAL
UNIT ABO/RH: NORMAL
UNIT NUMBER: NORMAL
UNIT NUMBER: NORMAL
UNIT STATUS: NORMAL
UNIT STATUS: NORMAL
UNIT TYPE ISBT: 6200
UNIT TYPE ISBT: 6200

## 2023-05-25 PROCEDURE — 200N000001 HC R&B ICU

## 2023-05-25 PROCEDURE — 99233 SBSQ HOSP IP/OBS HIGH 50: CPT | Performed by: INTERNAL MEDICINE

## 2023-05-25 PROCEDURE — 82310 ASSAY OF CALCIUM: CPT | Performed by: INTERNAL MEDICINE

## 2023-05-25 PROCEDURE — 250N000011 HC RX IP 250 OP 636: Performed by: HOSPITALIST

## 2023-05-25 PROCEDURE — 250N000013 HC RX MED GY IP 250 OP 250 PS 637: Performed by: EMERGENCY MEDICINE

## 2023-05-25 PROCEDURE — 86901 BLOOD TYPING SEROLOGIC RH(D): CPT | Performed by: PHYSICIAN ASSISTANT

## 2023-05-25 PROCEDURE — 36415 COLL VENOUS BLD VENIPUNCTURE: CPT | Performed by: PHYSICIAN ASSISTANT

## 2023-05-25 PROCEDURE — 70450 CT HEAD/BRAIN W/O DYE: CPT

## 2023-05-25 PROCEDURE — 86923 COMPATIBILITY TEST ELECTRIC: CPT | Performed by: PHYSICIAN ASSISTANT

## 2023-05-25 PROCEDURE — 84100 ASSAY OF PHOSPHORUS: CPT | Performed by: INTERNAL MEDICINE

## 2023-05-25 PROCEDURE — 83735 ASSAY OF MAGNESIUM: CPT | Performed by: INTERNAL MEDICINE

## 2023-05-25 RX ORDER — AMOXICILLIN 250 MG
1 CAPSULE ORAL AT BEDTIME
Status: DISCONTINUED | OUTPATIENT
Start: 2023-05-25 | End: 2023-05-26

## 2023-05-25 RX ADMIN — LEVETIRACETAM 500 MG: 500 TABLET, FILM COATED ORAL at 08:27

## 2023-05-25 RX ADMIN — DEXAMETHASONE SODIUM PHOSPHATE 4 MG: 4 INJECTION, SOLUTION INTRAMUSCULAR; INTRAVENOUS at 01:57

## 2023-05-25 RX ADMIN — LEVETIRACETAM 500 MG: 500 TABLET, FILM COATED ORAL at 20:06

## 2023-05-25 RX ADMIN — DEXAMETHASONE SODIUM PHOSPHATE 4 MG: 4 INJECTION, SOLUTION INTRAMUSCULAR; INTRAVENOUS at 08:27

## 2023-05-25 RX ADMIN — DEXAMETHASONE SODIUM PHOSPHATE 4 MG: 4 INJECTION, SOLUTION INTRAMUSCULAR; INTRAVENOUS at 20:06

## 2023-05-25 RX ADMIN — DEXAMETHASONE SODIUM PHOSPHATE 4 MG: 4 INJECTION, SOLUTION INTRAMUSCULAR; INTRAVENOUS at 15:00

## 2023-05-25 ASSESSMENT — ACTIVITIES OF DAILY LIVING (ADL)
ADLS_ACUITY_SCORE: 20

## 2023-05-25 ASSESSMENT — ENCOUNTER SYMPTOMS: SEIZURES: 1

## 2023-05-25 ASSESSMENT — LIFESTYLE VARIABLES: TOBACCO_USE: 0

## 2023-05-25 ASSESSMENT — COPD QUESTIONNAIRES: COPD: 0

## 2023-05-25 NOTE — PROGRESS NOTES
[facility]    Medicine Progress Note - Hospitalist Service    Date of Admission:  5/23/2023    Assessment & Plan   Sarah Frost is a 63 year old female admitted on 5/23/2023 with intermittent right sided weakness. CT imaging found left parietal lobe mass with moderate vasogenic edema     Left Parietal mass with moderate vasogenic edema   Intermittent R sided weakness suspected focal seizures related to mass  * CT head: cortically based left parietal lobe tumor with moderate associated vasogenic edema. Mass effect includes 5mm rightward midline shift, partial effacement of basilar cisterns without transtentorial herniation, partial effacement of the third and left lateral ventricles.  * CTA head/neck: no prox vessel occlusion or significant stenosis  * MRI brain-large large heterogeneously enhancing solid and cystic or necrotic lesion arising from the posterior superior aspect of the left frontal lobe, favored to be intra-axial. Primary differential considerations include high-grade primary brain neoplasm or metastasis. Moderate associated vasogenic edema.2.  4 mm ring-enhancing lesion is seen along the ventral base of the infundibulum, nonspecific. This could reflect an additional metastasis. Small plaque-like area of dural-based enhancement seen along the posterior aspect of the falx, nonspecific but  compatible with a meningioma (versus dural metastasis). 3.  2 mm nonenhancing focus posterior aspect of the pituitary likely reflecting a tiny cyst or cystically degenerated adenoma. Suggest further assessment with dedicated MRI of the sella.  * received decadron 10mg in ED  * EEG- abnormal due to the presence of focal delta slowing over the left posterior temporoparietal head region, consistent with structural abnormality there.  No electrographic seizures or epileptiform discharges were recorded.  * CT chest/abd/pelvis-no occult malignancy noted    - continue with decadron 4mg q6h  -  keppra 500mg bid  - q4hr neuro checks  - plan for craniotomy and resection of tumor on Friday 5/26  - Neurocritical care and neurology following, appreciate assistance  - repeating head CT today as below due to sinus bradycardia    Addendum-CT Head result report eviewed and unchanged when compared with previous imaging from 5/23      Sinus bradycardia  Patient is asymptomatic from this and no new neuro changes at this time. However, HR were mostly 50s-60s since admission and now sustaining in the 40s.   - CT head ordered, updated neurosurgery WILMAN that ordering CT head to ensure no change in brain lesion/edema  - check lytes  - discussed with Dr. Gilbert cardiology, will notify cards if symptomatic/pauses and/or sustaining in the 30s   - may need to reach out to anaesthesiologist for surgery tomorrow  - continue to monitor on tele     Hx VSD and PVCs  Moderate aortic stenosis  Cardiology consulted for preop evaluation by neurosurgery. Echo completed showing LVEF of 60-65%, small L to R shunt, mild to moderate aortic stenosis.   * cardiology eval completed, no futher cardiac work up need prior to surgery. Recommend OP f/u in adult congenital clinic.        Diet: Combination Diet Regular Diet Adult    DVT Prophylaxis: Pneumatic Compression Devices  Lea Catheter: Not present  Lines: None     Cardiac Monitoring: ACTIVE order. Indication: palpitations  Code Status: Full Code      Clinically Significant Risk Factors                                  Disposition Plan  likely 4-5 days pending surgery and post-op recovery    Expected Discharge Date: 05/25/2023                  Siva Mi MD  Hospitalist Service  [facility]  Securely message with [employer] (more info)  Text page via [employer] Paging/Directory   ______________________________________________________________________    Interval History   Patient denies headache, vision changes, nausea, dizziness. Denies chest pain or shortness of  breath.   Monitor with sinus bradycardia. HR were 50-60s yesterday and now mostly in the 40s.      Physical Exam   Vital Signs: Temp: 98.2  F (36.8  C) Temp src: Oral BP: 111/72 Pulse: 50   Resp: 10 SpO2: 95 % O2 Device: None (Room air) Oxygen Delivery: 2 LPM  Weight: 200 lbs 9.9 oz    General Appearance: Alert, awake and no apparent distress  Respiratory: clear to auscultation bilaterally  Cardiovascular: regular, sirisha  GI: soft and non-tender  Skin: warm and dry  Other: Alert and oriented, no facial asymmetry noted, no facial droop.     Medical Decision Making       55 MINUTES SPENT BY ME on the date of service doing chart review, history, exam, documentation & further activities per the note.  MANAGEMENT DISCUSSED with the following over the past 24 hours: patient, RN, cardiologist, neurosurgery   NOTE(S)/MEDICAL RECORDS REVIEWED over the past 24 hours: neurology, neurosurgery and cardiology notes  Tests ORDERED & REVIEWED in the past 24 hours:  - BMP  - CBC  Tests personally interpreted in the past 24 hours:  - echo showing as above      Data         Imaging results reviewed over the past 24 hrs:   Recent Results (from the past 24 hour(s))   Echocardiogram Complete   Result Value    LVEF  60-65%    Narrative    [id number]  [id number]  [id number]  [id number]^MAY^BREEZY     [facility]  Echocardiography Laboratory  [address] [place] [address]     Name: SUKUMAR NORRIS  MRN: [id number]  : 1960  Study Date: 2023 02:47 PM  Age: 63 yrs  Gender: Female  Patient Location: [facility]  Reason For Study: VSD  Ordering Physician: MAY POZO  Referring Physician: Gia Ref-Primary, Physician  Performed By: Kristie Melo RDCS     BSA: 2.1 m2  Height: 71 in  Weight: 196 lb  HR: 58  BP: 114/75 mmHg  ______________________________________________________________________________  Procedure  Complete Portable Echo  Adult.  ______________________________________________________________________________  Interpretation Summary     Small restrictive membranous ventricular septal defect  Left to right ventricular shunt, small  Mild to moderate valvular aortic stenosis.  The visual ejection fraction is 60-65%.  Left ventricular systolic function is normal.  There is trace aortic regurgitation.  The ascending aorta is Mildly dilated.  ______________________________________________________________________________  Left Ventricle  The left ventricle is normal in size. There is normal left ventricular wall  thickness. Diastolic Doppler findings (E/E' ratio and/or other parameters)  suggest left ventricular filling pressures are normal. Grade I or early  diastolic dysfunction. The visual ejection fraction is 60-65%. Left  ventricular systolic function is normal. Small restrictive membranous  ventricular septal defect. Left to right ventricular shunt, small.     Right Ventricle  The right ventricle is normal in size and function.     Atria  Normal left atrial size. Right atrial size is normal. There is no color  Doppler evidence of an atrial shunt.     Mitral Valve  There is trace mitral regurgitation.     Tricuspid Valve  There is trace tricuspid regurgitation.     Aortic Valve  The aortic valve is not well visualized. Thickened aortic valve leaflets.  There is trace aortic regurgitation. The calculated aortic valve are is 1.3  cm^2. The peak AoV pressure gradient is 34.1 mmHg. The mean AoV pressure  gradient is 19.0 mmHg. Mild to moderate valvular aortic stenosis.     Pulmonic Valve  There is trace pulmonic valvular regurgitation. Normal pulmonic valve  velocity.     Vessels  The aortic root is normal size. The ascending aorta is Mildly dilated. IVC  diameter <2.1 cm collapsing >50% with sniff suggests a normal RA pressure of 3  mmHg.     Pericardium  There is no pericardial effusion.     Rhythm  Sinus rhythm was  noted.  ______________________________________________________________________________  MMode/2D Measurements & Calculations  IVSd: 0.92 cm     LVIDd: 5.0 cm  LVIDs: 3.2 cm  LVPWd: 0.93 cm  FS: 36.7 %  LV mass(C)d: 163.0 grams  LV mass(C)dI: 78.0 grams/m2  Ao root diam: 3.3 cm  LA dimension: 3.7 cm  asc Aorta Diam: 3.6 cm  LA/Ao: 1.1  LVOT diam: 2.0 cm  LVOT area: 3.1 cm2  RVOT diam: 3.2 cm  LA Volume (BP): 69.8 ml  LA Volume Index (BP): 33.4 ml/m2  RV Base: 4.0 cm     RWT: 0.38  TAPSE: 3.0 cm     Doppler Measurements & Calculations  MV E max srikanth: 65.6 cm/sec  MV A max srikanth: 57.0 cm/sec  MV E/A: 1.2  MV dec time: 0.20 sec  Ao V2 max: 292.0 cm/sec  Ao max P.1 mmHg  Ao V2 mean: 204.0 cm/sec  Ao mean P.0 mmHg  Ao V2 VTI: 63.9 cm  NARDA(I,D): 1.3 cm2  NARDA(V,D): 1.2 cm2  LV V1 max P.0 mmHg  LV V1 max: 112.0 cm/sec  LV V1 VTI: 26.5 cm  SV(LVOT): 83.3 ml  SI(LVOT): 39.8 ml/m2  PA acc time: 0.14 sec  TR max srikanth: 234.0 cm/sec  TR max P.9 mmHg  Qp/Qs: 1.6/1.0  AV Srikanth Ratio (DI): 0.38  NARDA Index (cm2/m2): 0.62  E/E' av.6  Lateral E/e': 5.3     Medial E/e': 7.9  PVR: 140.3  RV S Srikanth: 14.0 cm/sec     ______________________________________________________________________________  Report approved by: Robi Banerjee 2023 04:40 PM

## 2023-05-25 NOTE — PLAN OF CARE
Pt A/O x 4, uses the call light appropriately. Neuro intact denies vision changes or headache. Able to go to the bathroom with SBA, unsteady gait noted on longer walks, gait leans to the right and cannot perceive depth of walls and objects. Denies Feeling weakness on RUE and RLE. HR 50-45s  asymptomatic, -110s, follow up with cardiology if HR sustains in the 30s, symptomatic, pauses noted.   Plan for craniotomy tomorrow Friday 5/26 at 0730. NPO after midnight

## 2023-05-25 NOTE — PROVIDER NOTIFICATION
Reported hospitalist pt's HR sustaining in the mid 40s. Pt denies dizziness, SBP 110s. Hospitalist spoke to TERRENCE PARHAM Ordered stat head CT.

## 2023-05-25 NOTE — PLAN OF CARE
Patient A&O x4, neuro checks stable. Denies any headache, dizziness or any changes in vision. VSS on RA; placed on 2L overnight due to desating while asleep. Tele Sinus Saad 40s-50s with occasional 38-39 but not sustaining. She is voiding adequately.  Plan: Craniotomy on Friday 5/26/23.

## 2023-05-25 NOTE — ANESTHESIA PREPROCEDURE EVALUATION
Anesthesia Pre-Procedure Evaluation    Patient: Sarah Norris   MRN: [id number] : 1960        Procedure : Procedure(s):  CRANIOTOMY, USING OPTICAL TRACKING SYSTEM, WITH NEOPLASM EXCISION STEALTH ASSISTED LEFT FRONTOPARIETAL CRANIOTOMY WITH RESCETION OF BRAIN TUMOR          Past Medical History:   Diagnosis Date     Congenital anomaly of heart valve       No past surgical history on file.   No Known Allergies   Social History     Tobacco Use     Smoking status: Never     Smokeless tobacco: Never   Vaping Use     Vaping status: Not on file   Substance Use Topics     Alcohol use: Not Currently      Wt Readings from Last 1 Encounters:   23 91 kg (200 lb 9.9 oz)        Anesthesia Evaluation   Pt has had prior anesthetic.     No history of anesthetic complications       ROS/MED HX  ENT/Pulmonary:    (-) tobacco use, asthma, COPD and sleep apnea   Neurologic: Comment: Weakness and possible seizures    (+) seizures,     Cardiovascular: Comment: VSD  Bradycardia  Moderate AS    (+) -----Previous cardiac testing   Echo: Date: 23 Results:  Narrative  [id number]  [id number]  [id number]  [id number]^SÁNCHEZ^MAY^BREEZY     [facility]  Echocardiography Laboratory  [address] [place] [address]     Name: SARAH NORRIS  MRN: [id number]  : 1960  Study Date: 2023 02:47 PM  Age: 63 yrs  Gender: Female  Patient Location: [facility]  Reason For Study: VSD  Ordering Physician: MAY POZO  Referring Physician: Gia Ref-Primary, Physician  Performed By: Kristie Melo RDCS     BSA: 2.1 m2  Height: 71 in  Weight: 196 lb  HR: 58  BP: 114/75 mmHg  ______________________________________________________________________________  Procedure  Complete Portable Echo Adult.  ______________________________________________________________________________  Interpretation Summary     Small restrictive membranous ventricular septal defect  Left to right ventricular shunt,  small  Mild to moderate valvular aortic stenosis.  The visual ejection fraction is 60-65%.  Left ventricular systolic function is normal.  There is trace aortic regurgitation.  The ascending aorta is Mildly dilated.  ______________________________________________________________________________  Left Ventricle  The left ventricle is normal in size. There is normal left ventricular wall  thickness. Diastolic Doppler findings (E/E' ratio and/or other parameters)  suggest left ventricular filling pressures are normal. Grade I or early  diastolic dysfunction. The visual ejection fraction is 60-65%. Left  ventricular systolic function is normal. Small restrictive membranous  ventricular septal defect. Left to right ventricular shunt, small.     Right Ventricle  The right ventricle is normal in size and function.     Atria  Normal left atrial size. Right atrial size is normal. There is no color  Doppler evidence of an atrial shunt.     Mitral Valve  There is trace mitral regurgitation.     Tricuspid Valve  There is trace tricuspid regurgitation.     Aortic Valve  The aortic valve is not well visualized. Thickened aortic valve leaflets.  There is trace aortic regurgitation. The calculated aortic valve are is 1.3  cm^2. The peak AoV pressure gradient is 34.1 mmHg. The mean AoV pressure  gradient is 19.0 mmHg. Mild to moderate valvular aortic stenosis.     Pulmonic Valve  There is trace pulmonic valvular regurgitation. Normal pulmonic valve  velocity.     Vessels  The aortic root is normal size. The ascending aorta is Mildly dilated. IVC  diameter <2.1 cm collapsing >50% with sniff suggests a normal RA pressure of 3  mmHg.     Pericardium  There is no pericardial effusion.     Rhythm  Sinus rhythm was noted.  ______________________________________________________________________________  MMode/2D Measurements & Calculations  IVSd: 0.92 cm     LVIDd: 5.0 cm  LVIDs: 3.2 cm  LVPWd: 0.93 cm  FS: 36.7 %  LV mass(C)d: 163.0  grams  LV mass(C)dI: 78.0 grams/m2  Ao root diam: 3.3 cm  LA dimension: 3.7 cm  asc Aorta Diam: 3.6 cm  LA/Ao: 1.1  Stress Test: Date: Results:    ECG Reviewed: Date: Results:    Cath: Date: Results:   (-) hypertension and CAD   METS/Exercise Tolerance:     Hematologic:  - neg hematologic  ROS  (-) anemia   Musculoskeletal:       GI/Hepatic:    (-) GERD and liver disease   Renal/Genitourinary:    (-) renal disease   Endo:    (-) Type I DM and Type II DM   Psychiatric/Substance Use:       Infectious Disease:       Malignancy:   (+) Malignancy, History of Neuro.Neuro CA Active status post.     Other:            Physical Exam    Airway        Mallampati: II   TM distance: > 3 FB   Neck ROM: full   Mouth opening: > 3 cm    Respiratory Devices and Support         Dental       (+) Minor Abnormalities - some fillings, tiny chips      Cardiovascular          Rhythm and rate: regular and bradycardia     Pulmonary   pulmonary exam normal                OUTSIDE LABS:  CBC:   Lab Results   Component Value Date    WBC 4.3 05/24/2023    WBC 5.8 05/23/2023    HGB 14.4 05/24/2023    HGB 14.7 05/23/2023    HCT 41.8 05/24/2023    HCT 41.4 05/23/2023     05/24/2023     05/23/2023     BMP:   Lab Results   Component Value Date     (L) 05/25/2023     05/24/2023    POTASSIUM 4.5 05/25/2023    POTASSIUM 4.3 05/24/2023    CHLORIDE 101 05/25/2023    CHLORIDE 102 05/24/2023    CO2 19 (L) 05/25/2023    CO2 23 05/24/2023    BUN 20.1 05/25/2023    BUN 14.3 05/24/2023    CR 0.81 05/25/2023    CR 0.84 05/24/2023     (H) 05/25/2023     (H) 05/24/2023     COAGS:   Lab Results   Component Value Date    PTT 24 05/23/2023    INR 1.01 05/23/2023     POC: No results found for: BGM, HCG, HCGS  HEPATIC: No results found for: ALBUMIN, PROTTOTAL, ALT, AST, GGT, ALKPHOS, BILITOTAL, BILIDIRECT, KENNY  OTHER:   Lab Results   Component Value Date    QUAN 10.3 (H) 05/25/2023    PHOS 3.4 05/25/2023    MAG 2.1 05/25/2023        Anesthesia Plan    ASA Status:  3   NPO Status:  NPO Appropriate    Anesthesia Type: General.     - Airway: ETT   Induction: Intravenous.   Maintenance: TIVA.   Techniques and Equipment:     - Lines/Monitors: 2nd IV, Arterial Line, Central Line     - Blood: T&C     - Drips/Meds: Steroid Stress Dose, Norepi, Remifentanil, Fentanyl     Consents    Anesthesia Plan(s) and associated risks, benefits, and realistic alternatives discussed. Questions answered and patient/representative(s) expressed understanding.    - Discussed:     - Discussed with:  Patient      - Extended Intubation/Ventilatory Support Discussed: Yes.      - Patient is DNR/DNI Status: No    Use of blood products discussed: Yes.     - Discussed with: Patient.     - Consented: consented to blood products            Reason for refusal: other.     Postoperative Care    Pain management: IV analgesics.     - Plan for long acting post-op opioid use   PONV prophylaxis: Dexamethasone or Solumedrol, Ondansetron (or other 5HT-3), Background Propofol Infusion     Comments:    Other Comments: Keppra, Mannitol and Decadron per surgeon  TIVA with Propofol and Remifentanil  Norepi infusion and bolus syringe (Bradycardic at baseline)  Drain all air out of lines and use line filters as available            Austin Bravo MD

## 2023-05-26 ENCOUNTER — ANESTHESIA (OUTPATIENT)
Dept: SURGERY | Facility: CLINIC | Age: 63
DRG: 025 | End: 2023-05-26
Payer: COMMERCIAL

## 2023-05-26 LAB
ANION GAP SERPL CALCULATED.3IONS-SCNC: 10 MMOL/L (ref 7–15)
ANION GAP SERPL CALCULATED.3IONS-SCNC: 9 MMOL/L (ref 7–15)
BUN SERPL-MCNC: 15.1 MG/DL (ref 8–23)
BUN SERPL-MCNC: 21.7 MG/DL (ref 8–23)
CALCIUM SERPL-MCNC: 9.4 MG/DL (ref 8.8–10.2)
CALCIUM SERPL-MCNC: 9.9 MG/DL (ref 8.8–10.2)
CHLORIDE SERPL-SCNC: 103 MMOL/L (ref 98–107)
CHLORIDE SERPL-SCNC: 105 MMOL/L (ref 98–107)
CREAT SERPL-MCNC: 0.94 MG/DL (ref 0.51–0.95)
CREAT SERPL-MCNC: 1.07 MG/DL (ref 0.51–0.95)
DEPRECATED HCO3 PLAS-SCNC: 24 MMOL/L (ref 22–29)
DEPRECATED HCO3 PLAS-SCNC: 26 MMOL/L (ref 22–29)
ERYTHROCYTE [DISTWIDTH] IN BLOOD BY AUTOMATED COUNT: 11.9 % (ref 10–15)
ERYTHROCYTE [DISTWIDTH] IN BLOOD BY AUTOMATED COUNT: 11.9 % (ref 10–15)
GFR SERPL CREATININE-BSD FRML MDRD: 58 ML/MIN/1.73M2
GFR SERPL CREATININE-BSD FRML MDRD: 68 ML/MIN/1.73M2
GLUCOSE BLDC GLUCOMTR-MCNC: 128 MG/DL (ref 70–99)
GLUCOSE BLDC GLUCOMTR-MCNC: 163 MG/DL (ref 70–99)
GLUCOSE SERPL-MCNC: 128 MG/DL (ref 70–99)
GLUCOSE SERPL-MCNC: 156 MG/DL (ref 70–99)
HCT VFR BLD AUTO: 37.7 % (ref 35–47)
HCT VFR BLD AUTO: 40.2 % (ref 35–47)
HGB BLD-MCNC: 12.8 G/DL (ref 11.7–15.7)
HGB BLD-MCNC: 13.6 G/DL (ref 11.7–15.7)
MCH RBC QN AUTO: 32.2 PG (ref 26.5–33)
MCH RBC QN AUTO: 32.5 PG (ref 26.5–33)
MCHC RBC AUTO-ENTMCNC: 33.8 G/DL (ref 31.5–36.5)
MCHC RBC AUTO-ENTMCNC: 34 G/DL (ref 31.5–36.5)
MCV RBC AUTO: 95 FL (ref 78–100)
MCV RBC AUTO: 96 FL (ref 78–100)
PLATELET # BLD AUTO: 186 10E3/UL (ref 150–450)
PLATELET # BLD AUTO: 215 10E3/UL (ref 150–450)
POTASSIUM SERPL-SCNC: 4.2 MMOL/L (ref 3.4–5.3)
POTASSIUM SERPL-SCNC: 5.4 MMOL/L (ref 3.4–5.3)
RBC # BLD AUTO: 3.97 10E6/UL (ref 3.8–5.2)
RBC # BLD AUTO: 4.19 10E6/UL (ref 3.8–5.2)
SODIUM SERPL-SCNC: 138 MMOL/L (ref 136–145)
SODIUM SERPL-SCNC: 139 MMOL/L (ref 136–145)
WBC # BLD AUTO: 10.4 10E3/UL (ref 4–11)
WBC # BLD AUTO: 7.8 10E3/UL (ref 4–11)

## 2023-05-26 PROCEDURE — 250N000011 HC RX IP 250 OP 636: Performed by: NURSE ANESTHETIST, CERTIFIED REGISTERED

## 2023-05-26 PROCEDURE — 710N000010 HC RECOVERY PHASE 1, LEVEL 2, PER MIN: Performed by: NEUROLOGICAL SURGERY

## 2023-05-26 PROCEDURE — 200N000001 HC R&B ICU

## 2023-05-26 PROCEDURE — 4A1034Z MONITORING OF CENTRAL NERVOUS ELECTRICAL ACTIVITY, PERCUTANEOUS APPROACH: ICD-10-PCS | Performed by: NEUROLOGICAL SURGERY

## 2023-05-26 PROCEDURE — 250N000009 HC RX 250: Performed by: NURSE ANESTHETIST, CERTIFIED REGISTERED

## 2023-05-26 PROCEDURE — 80048 BASIC METABOLIC PNL TOTAL CA: CPT | Performed by: NEUROLOGICAL SURGERY

## 2023-05-26 PROCEDURE — 922N000001 HC NEURO MONITORING SERVICE, UP TO 7 HOURS (T1FEE): Performed by: NEUROLOGICAL SURGERY

## 2023-05-26 PROCEDURE — 250N000011 HC RX IP 250 OP 636: Performed by: PHYSICIAN ASSISTANT

## 2023-05-26 PROCEDURE — C1713 ANCHOR/SCREW BN/BN,TIS/BN: HCPCS | Performed by: NEUROLOGICAL SURGERY

## 2023-05-26 PROCEDURE — 88342 IMHCHEM/IMCYTCHM 1ST ANTB: CPT | Mod: 26 | Performed by: SPECIALIST

## 2023-05-26 PROCEDURE — 258N000003 HC RX IP 258 OP 636: Performed by: NURSE ANESTHETIST, CERTIFIED REGISTERED

## 2023-05-26 PROCEDURE — 250N000013 HC RX MED GY IP 250 OP 250 PS 637: Performed by: PHYSICIAN ASSISTANT

## 2023-05-26 PROCEDURE — 272N000282 HC OR IOM SUPPLIES OPNP: Performed by: NEUROLOGICAL SURGERY

## 2023-05-26 PROCEDURE — 88360 TUMOR IMMUNOHISTOCHEM/MANUAL: CPT | Mod: 26 | Performed by: SPECIALIST

## 2023-05-26 PROCEDURE — 82310 ASSAY OF CALCIUM: CPT | Performed by: ANESTHESIOLOGY

## 2023-05-26 PROCEDURE — 61512 CRNEC TREPH EXC MNGIOMA STTL: CPT | Mod: GC | Performed by: NEUROLOGICAL SURGERY

## 2023-05-26 PROCEDURE — 250N000013 HC RX MED GY IP 250 OP 250 PS 637: Performed by: EMERGENCY MEDICINE

## 2023-05-26 PROCEDURE — 250N000013 HC RX MED GY IP 250 OP 250 PS 637: Performed by: NURSE ANESTHETIST, CERTIFIED REGISTERED

## 2023-05-26 PROCEDURE — 85027 COMPLETE CBC AUTOMATED: CPT | Performed by: NEUROLOGICAL SURGERY

## 2023-05-26 PROCEDURE — 88307 TISSUE EXAM BY PATHOLOGIST: CPT | Mod: TC | Performed by: NEUROLOGICAL SURGERY

## 2023-05-26 PROCEDURE — 250N000012 HC RX MED GY IP 250 OP 636 PS 637: Performed by: PHYSICIAN ASSISTANT

## 2023-05-26 PROCEDURE — 250N000011 HC RX IP 250 OP 636: Performed by: HOSPITALIST

## 2023-05-26 PROCEDURE — 250N000013 HC RX MED GY IP 250 OP 250 PS 637: Performed by: INTERNAL MEDICINE

## 2023-05-26 PROCEDURE — 61781 SCAN PROC CRANIAL INTRA: CPT | Performed by: NEUROLOGICAL SURGERY

## 2023-05-26 PROCEDURE — 250N000025 HC SEVOFLURANE, PER MIN: Performed by: NEUROLOGICAL SURGERY

## 2023-05-26 PROCEDURE — 8E09XBZ COMPUTER ASSISTED PROCEDURE OF HEAD AND NECK REGION: ICD-10-PCS | Performed by: NEUROLOGICAL SURGERY

## 2023-05-26 PROCEDURE — 250N000009 HC RX 250: Performed by: NEUROLOGICAL SURGERY

## 2023-05-26 PROCEDURE — 370N000017 HC ANESTHESIA TECHNICAL FEE, PER MIN: Performed by: NEUROLOGICAL SURGERY

## 2023-05-26 PROCEDURE — C1763 CONN TISS, NON-HUMAN: HCPCS | Performed by: NEUROLOGICAL SURGERY

## 2023-05-26 PROCEDURE — 88331 PATH CONSLTJ SURG 1 BLK 1SPC: CPT | Mod: 26 | Performed by: PATHOLOGY

## 2023-05-26 PROCEDURE — 36415 COLL VENOUS BLD VENIPUNCTURE: CPT | Performed by: NEUROLOGICAL SURGERY

## 2023-05-26 PROCEDURE — 250N000011 HC RX IP 250 OP 636: Performed by: NEUROLOGICAL SURGERY

## 2023-05-26 PROCEDURE — 00B70ZZ EXCISION OF CEREBRAL HEMISPHERE, OPEN APPROACH: ICD-10-PCS | Performed by: NEUROLOGICAL SURGERY

## 2023-05-26 PROCEDURE — 250N000011 HC RX IP 250 OP 636: Performed by: ANESTHESIOLOGY

## 2023-05-26 PROCEDURE — 999N000141 HC STATISTIC PRE-PROCEDURE NURSING ASSESSMENT: Performed by: NEUROLOGICAL SURGERY

## 2023-05-26 PROCEDURE — 85027 COMPLETE CBC AUTOMATED: CPT | Performed by: ANESTHESIOLOGY

## 2023-05-26 PROCEDURE — 272N000001 HC OR GENERAL SUPPLY STERILE: Performed by: NEUROLOGICAL SURGERY

## 2023-05-26 PROCEDURE — 88307 TISSUE EXAM BY PATHOLOGIST: CPT | Mod: 26 | Performed by: SPECIALIST

## 2023-05-26 PROCEDURE — 360N000079 HC SURGERY LEVEL 6, PER MIN: Performed by: NEUROLOGICAL SURGERY

## 2023-05-26 PROCEDURE — 88331 PATH CONSLTJ SURG 1 BLK 1SPC: CPT | Mod: TC | Performed by: NEUROLOGICAL SURGERY

## 2023-05-26 DEVICE — IMP PLATE SYN MATRIXNEURO STR 2 HOLE 12MM  04.503.062: Type: IMPLANTABLE DEVICE | Site: CRANIAL | Status: FUNCTIONAL

## 2023-05-26 DEVICE — IMP SCR SYN MATRIX LOW PRO 1.5X04MM SELF DRILL 04.503.104.01: Type: IMPLANTABLE DEVICE | Site: CRANIAL | Status: FUNCTIONAL

## 2023-05-26 DEVICE — GRAFT DURAGEN DURAL MATRIX 4X5CM ID-4501: Type: IMPLANTABLE DEVICE | Site: CRANIAL | Status: FUNCTIONAL

## 2023-05-26 DEVICE — IMP PLATE SYN BURR HOLE COVER 17MM 04.503.023: Type: IMPLANTABLE DEVICE | Site: CRANIAL | Status: FUNCTIONAL

## 2023-05-26 RX ORDER — OXYCODONE HYDROCHLORIDE 5 MG/1
10 TABLET ORAL EVERY 4 HOURS PRN
Status: DISCONTINUED | OUTPATIENT
Start: 2023-05-26 | End: 2023-05-29 | Stop reason: HOSPADM

## 2023-05-26 RX ORDER — NALOXONE HYDROCHLORIDE 0.4 MG/ML
0.4 INJECTION, SOLUTION INTRAMUSCULAR; INTRAVENOUS; SUBCUTANEOUS
Status: DISCONTINUED | OUTPATIENT
Start: 2023-05-26 | End: 2023-05-29 | Stop reason: HOSPADM

## 2023-05-26 RX ORDER — PROPOFOL 10 MG/ML
INJECTION, EMULSION INTRAVENOUS PRN
Status: DISCONTINUED | OUTPATIENT
Start: 2023-05-26 | End: 2023-05-26

## 2023-05-26 RX ORDER — METHOCARBAMOL 500 MG/1
1000 TABLET, FILM COATED ORAL EVERY 6 HOURS PRN
Status: DISCONTINUED | OUTPATIENT
Start: 2023-05-26 | End: 2023-05-29 | Stop reason: HOSPADM

## 2023-05-26 RX ORDER — OXYCODONE HYDROCHLORIDE 5 MG/1
5 TABLET ORAL EVERY 4 HOURS PRN
Status: DISCONTINUED | OUTPATIENT
Start: 2023-05-26 | End: 2023-05-29 | Stop reason: HOSPADM

## 2023-05-26 RX ORDER — SODIUM CHLORIDE, SODIUM LACTATE, POTASSIUM CHLORIDE, CALCIUM CHLORIDE 600; 310; 30; 20 MG/100ML; MG/100ML; MG/100ML; MG/100ML
INJECTION, SOLUTION INTRAVENOUS CONTINUOUS
Status: DISCONTINUED | OUTPATIENT
Start: 2023-05-26 | End: 2023-05-26 | Stop reason: HOSPADM

## 2023-05-26 RX ORDER — SODIUM CHLORIDE, SODIUM LACTATE, POTASSIUM CHLORIDE, CALCIUM CHLORIDE 600; 310; 30; 20 MG/100ML; MG/100ML; MG/100ML; MG/100ML
INJECTION, SOLUTION INTRAVENOUS CONTINUOUS PRN
Status: DISCONTINUED | OUTPATIENT
Start: 2023-05-26 | End: 2023-05-26

## 2023-05-26 RX ORDER — ACETAMINOPHEN 325 MG/1
650 TABLET ORAL EVERY 4 HOURS PRN
Status: DISCONTINUED | OUTPATIENT
Start: 2023-05-29 | End: 2023-05-29 | Stop reason: HOSPADM

## 2023-05-26 RX ORDER — HYDROMORPHONE HCL IN WATER/PF 6 MG/30 ML
0.4 PATIENT CONTROLLED ANALGESIA SYRINGE INTRAVENOUS
Status: DISCONTINUED | OUTPATIENT
Start: 2023-05-26 | End: 2023-05-29 | Stop reason: HOSPADM

## 2023-05-26 RX ORDER — HYDROMORPHONE HYDROCHLORIDE 1 MG/ML
INJECTION, SOLUTION INTRAMUSCULAR; INTRAVENOUS; SUBCUTANEOUS PRN
Status: DISCONTINUED | OUTPATIENT
Start: 2023-05-26 | End: 2023-05-26

## 2023-05-26 RX ORDER — FENTANYL CITRATE 0.05 MG/ML
50 INJECTION, SOLUTION INTRAMUSCULAR; INTRAVENOUS
Status: DISCONTINUED | OUTPATIENT
Start: 2023-05-26 | End: 2023-05-26 | Stop reason: HOSPADM

## 2023-05-26 RX ORDER — FENTANYL CITRATE 50 UG/ML
INJECTION, SOLUTION INTRAMUSCULAR; INTRAVENOUS PRN
Status: DISCONTINUED | OUTPATIENT
Start: 2023-05-26 | End: 2023-05-26

## 2023-05-26 RX ORDER — BACITRACIN ZINC 500 [USP'U]/G
OINTMENT TOPICAL PRN
Status: DISCONTINUED | OUTPATIENT
Start: 2023-05-26 | End: 2023-05-26 | Stop reason: HOSPADM

## 2023-05-26 RX ORDER — PROCHLORPERAZINE MALEATE 10 MG
10 TABLET ORAL EVERY 6 HOURS PRN
Status: DISCONTINUED | OUTPATIENT
Start: 2023-05-26 | End: 2023-05-29 | Stop reason: HOSPADM

## 2023-05-26 RX ORDER — NOREPINEPHRINE BITARTRATE 0.02 MG/ML
INJECTION, SOLUTION INTRAVENOUS CONTINUOUS PRN
Status: DISCONTINUED | OUTPATIENT
Start: 2023-05-26 | End: 2023-05-26

## 2023-05-26 RX ORDER — CEFAZOLIN SODIUM 1 G/3ML
1 INJECTION, POWDER, FOR SOLUTION INTRAMUSCULAR; INTRAVENOUS EVERY 8 HOURS
Status: COMPLETED | OUTPATIENT
Start: 2023-05-26 | End: 2023-05-27

## 2023-05-26 RX ORDER — LEVETIRACETAM 100 MG/ML
SOLUTION ORAL PRN
Status: DISCONTINUED | OUTPATIENT
Start: 2023-05-26 | End: 2023-05-26

## 2023-05-26 RX ORDER — DEXAMETHASONE 4 MG/1
4 TABLET ORAL EVERY 6 HOURS SCHEDULED
Status: DISCONTINUED | OUTPATIENT
Start: 2023-05-26 | End: 2023-05-27

## 2023-05-26 RX ORDER — ONDANSETRON 2 MG/ML
4 INJECTION INTRAMUSCULAR; INTRAVENOUS EVERY 6 HOURS PRN
Status: DISCONTINUED | OUTPATIENT
Start: 2023-05-26 | End: 2023-05-29 | Stop reason: HOSPADM

## 2023-05-26 RX ORDER — DEXAMETHASONE SODIUM PHOSPHATE 4 MG/ML
4 INJECTION, SOLUTION INTRA-ARTICULAR; INTRALESIONAL; INTRAMUSCULAR; INTRAVENOUS; SOFT TISSUE EVERY 6 HOURS SCHEDULED
Status: DISCONTINUED | OUTPATIENT
Start: 2023-05-26 | End: 2023-05-27

## 2023-05-26 RX ORDER — PROPOFOL 10 MG/ML
INJECTION, EMULSION INTRAVENOUS CONTINUOUS PRN
Status: DISCONTINUED | OUTPATIENT
Start: 2023-05-26 | End: 2023-05-26

## 2023-05-26 RX ORDER — HYDROXYZINE HYDROCHLORIDE 25 MG/1
25 TABLET, FILM COATED ORAL EVERY 6 HOURS PRN
Status: DISCONTINUED | OUTPATIENT
Start: 2023-05-26 | End: 2023-05-29 | Stop reason: HOSPADM

## 2023-05-26 RX ORDER — DEXAMETHASONE SODIUM PHOSPHATE 4 MG/ML
INJECTION, SOLUTION INTRA-ARTICULAR; INTRALESIONAL; INTRAMUSCULAR; INTRAVENOUS; SOFT TISSUE PRN
Status: DISCONTINUED | OUTPATIENT
Start: 2023-05-26 | End: 2023-05-26

## 2023-05-26 RX ORDER — CEFAZOLIN SODIUM/WATER 2 G/20 ML
2 SYRINGE (ML) INTRAVENOUS
Status: COMPLETED | OUTPATIENT
Start: 2023-05-26 | End: 2023-05-26

## 2023-05-26 RX ORDER — LIDOCAINE 40 MG/G
CREAM TOPICAL
Status: DISCONTINUED | OUTPATIENT
Start: 2023-05-26 | End: 2023-05-26

## 2023-05-26 RX ORDER — HYDROMORPHONE HCL IN WATER/PF 6 MG/30 ML
0.4 PATIENT CONTROLLED ANALGESIA SYRINGE INTRAVENOUS EVERY 5 MIN PRN
Status: DISCONTINUED | OUTPATIENT
Start: 2023-05-26 | End: 2023-05-26

## 2023-05-26 RX ORDER — HYDRALAZINE HYDROCHLORIDE 20 MG/ML
10-20 INJECTION INTRAMUSCULAR; INTRAVENOUS EVERY 30 MIN PRN
Status: DISCONTINUED | OUTPATIENT
Start: 2023-05-26 | End: 2023-05-29 | Stop reason: HOSPADM

## 2023-05-26 RX ORDER — ONDANSETRON 2 MG/ML
INJECTION INTRAMUSCULAR; INTRAVENOUS PRN
Status: DISCONTINUED | OUTPATIENT
Start: 2023-05-26 | End: 2023-05-26

## 2023-05-26 RX ORDER — NALOXONE HYDROCHLORIDE 0.4 MG/ML
0.2 INJECTION, SOLUTION INTRAMUSCULAR; INTRAVENOUS; SUBCUTANEOUS
Status: DISCONTINUED | OUTPATIENT
Start: 2023-05-26 | End: 2023-05-29 | Stop reason: HOSPADM

## 2023-05-26 RX ORDER — ONDANSETRON 4 MG/1
4 TABLET, ORALLY DISINTEGRATING ORAL EVERY 30 MIN PRN
Status: DISCONTINUED | OUTPATIENT
Start: 2023-05-26 | End: 2023-05-26

## 2023-05-26 RX ORDER — LIDOCAINE HYDROCHLORIDE 20 MG/ML
INJECTION, SOLUTION INFILTRATION; PERINEURAL PRN
Status: DISCONTINUED | OUTPATIENT
Start: 2023-05-26 | End: 2023-05-26

## 2023-05-26 RX ORDER — MAGNESIUM HYDROXIDE 1200 MG/15ML
LIQUID ORAL PRN
Status: DISCONTINUED | OUTPATIENT
Start: 2023-05-26 | End: 2023-05-26 | Stop reason: HOSPADM

## 2023-05-26 RX ORDER — HYDROMORPHONE HCL IN WATER/PF 6 MG/30 ML
0.2 PATIENT CONTROLLED ANALGESIA SYRINGE INTRAVENOUS
Status: DISCONTINUED | OUTPATIENT
Start: 2023-05-26 | End: 2023-05-29 | Stop reason: HOSPADM

## 2023-05-26 RX ORDER — MANNITOL 20 G/100ML
INJECTION, SOLUTION INTRAVENOUS PRN
Status: DISCONTINUED | OUTPATIENT
Start: 2023-05-26 | End: 2023-05-26

## 2023-05-26 RX ORDER — BISACODYL 10 MG
10 SUPPOSITORY, RECTAL RECTAL DAILY PRN
Status: DISCONTINUED | OUTPATIENT
Start: 2023-05-26 | End: 2023-05-29

## 2023-05-26 RX ORDER — AMOXICILLIN 250 MG
1 CAPSULE ORAL 2 TIMES DAILY
Status: DISCONTINUED | OUTPATIENT
Start: 2023-05-26 | End: 2023-05-28

## 2023-05-26 RX ORDER — EPHEDRINE SULFATE 50 MG/ML
INJECTION, SOLUTION INTRAMUSCULAR; INTRAVENOUS; SUBCUTANEOUS PRN
Status: DISCONTINUED | OUTPATIENT
Start: 2023-05-26 | End: 2023-05-26

## 2023-05-26 RX ORDER — ONDANSETRON 4 MG/1
4 TABLET, ORALLY DISINTEGRATING ORAL EVERY 6 HOURS PRN
Status: DISCONTINUED | OUTPATIENT
Start: 2023-05-26 | End: 2023-05-29 | Stop reason: HOSPADM

## 2023-05-26 RX ORDER — LABETALOL HYDROCHLORIDE 5 MG/ML
10-40 INJECTION, SOLUTION INTRAVENOUS EVERY 10 MIN PRN
Status: DISCONTINUED | OUTPATIENT
Start: 2023-05-26 | End: 2023-05-29 | Stop reason: HOSPADM

## 2023-05-26 RX ORDER — HYDROMORPHONE HCL IN WATER/PF 6 MG/30 ML
0.2 PATIENT CONTROLLED ANALGESIA SYRINGE INTRAVENOUS EVERY 5 MIN PRN
Status: DISCONTINUED | OUTPATIENT
Start: 2023-05-26 | End: 2023-05-26

## 2023-05-26 RX ORDER — ACETAMINOPHEN 325 MG/1
975 TABLET ORAL EVERY 8 HOURS
Status: COMPLETED | OUTPATIENT
Start: 2023-05-26 | End: 2023-05-29

## 2023-05-26 RX ORDER — SODIUM CHLORIDE, SODIUM LACTATE, POTASSIUM CHLORIDE, CALCIUM CHLORIDE 600; 310; 30; 20 MG/100ML; MG/100ML; MG/100ML; MG/100ML
INJECTION, SOLUTION INTRAVENOUS CONTINUOUS
Status: DISCONTINUED | OUTPATIENT
Start: 2023-05-26 | End: 2023-05-26

## 2023-05-26 RX ORDER — BUPIVACAINE HYDROCHLORIDE AND EPINEPHRINE 2.5; 5 MG/ML; UG/ML
INJECTION, SOLUTION INFILTRATION; PERINEURAL PRN
Status: DISCONTINUED | OUTPATIENT
Start: 2023-05-26 | End: 2023-05-26 | Stop reason: HOSPADM

## 2023-05-26 RX ORDER — FENTANYL CITRATE 0.05 MG/ML
25 INJECTION, SOLUTION INTRAMUSCULAR; INTRAVENOUS EVERY 5 MIN PRN
Status: DISCONTINUED | OUTPATIENT
Start: 2023-05-26 | End: 2023-05-26

## 2023-05-26 RX ORDER — CALCIUM CARBONATE 500 MG/1
1000 TABLET, CHEWABLE ORAL 4 TIMES DAILY PRN
Status: DISCONTINUED | OUTPATIENT
Start: 2023-05-26 | End: 2023-05-29 | Stop reason: HOSPADM

## 2023-05-26 RX ORDER — SODIUM CHLORIDE 9 MG/ML
INJECTION, SOLUTION INTRAVENOUS CONTINUOUS PRN
Status: DISCONTINUED | OUTPATIENT
Start: 2023-05-26 | End: 2023-05-26

## 2023-05-26 RX ORDER — FENTANYL CITRATE 0.05 MG/ML
50 INJECTION, SOLUTION INTRAMUSCULAR; INTRAVENOUS EVERY 5 MIN PRN
Status: DISCONTINUED | OUTPATIENT
Start: 2023-05-26 | End: 2023-05-26

## 2023-05-26 RX ORDER — POLYETHYLENE GLYCOL 3350 17 G/17G
17 POWDER, FOR SOLUTION ORAL DAILY
Status: DISCONTINUED | OUTPATIENT
Start: 2023-05-27 | End: 2023-05-29 | Stop reason: HOSPADM

## 2023-05-26 RX ORDER — ONDANSETRON 2 MG/ML
4 INJECTION INTRAMUSCULAR; INTRAVENOUS EVERY 30 MIN PRN
Status: DISCONTINUED | OUTPATIENT
Start: 2023-05-26 | End: 2023-05-26

## 2023-05-26 RX ORDER — CEFAZOLIN SODIUM/WATER 2 G/20 ML
2 SYRINGE (ML) INTRAVENOUS SEE ADMIN INSTRUCTIONS
Status: DISCONTINUED | OUTPATIENT
Start: 2023-05-26 | End: 2023-05-26 | Stop reason: HOSPADM

## 2023-05-26 RX ADMIN — FENTANYL CITRATE 50 MCG: 50 INJECTION, SOLUTION INTRAMUSCULAR; INTRAVENOUS at 07:15

## 2023-05-26 RX ADMIN — Medication 7.5 MG: at 08:44

## 2023-05-26 RX ADMIN — DEXAMETHASONE SODIUM PHOSPHATE 10 MG: 4 INJECTION, SOLUTION INTRA-ARTICULAR; INTRALESIONAL; INTRAMUSCULAR; INTRAVENOUS; SOFT TISSUE at 08:20

## 2023-05-26 RX ADMIN — PROPOFOL 150 MG: 10 INJECTION, EMULSION INTRAVENOUS at 08:12

## 2023-05-26 RX ADMIN — SODIUM CHLORIDE, POTASSIUM CHLORIDE, SODIUM LACTATE AND CALCIUM CHLORIDE: 600; 310; 30; 20 INJECTION, SOLUTION INTRAVENOUS at 06:58

## 2023-05-26 RX ADMIN — NOREPINEPHRINE BITARTRATE 6.4 MCG: 1 INJECTION, SOLUTION, CONCENTRATE INTRAVENOUS at 13:34

## 2023-05-26 RX ADMIN — MIDAZOLAM 2 MG: 1 INJECTION INTRAMUSCULAR; INTRAVENOUS at 07:05

## 2023-05-26 RX ADMIN — FENTANYL CITRATE 100 MCG: 50 INJECTION, SOLUTION INTRAMUSCULAR; INTRAVENOUS at 08:12

## 2023-05-26 RX ADMIN — HYDROMORPHONE HYDROCHLORIDE 0.4 MG: 0.2 INJECTION, SOLUTION INTRAMUSCULAR; INTRAVENOUS; SUBCUTANEOUS at 16:10

## 2023-05-26 RX ADMIN — ROCURONIUM BROMIDE 25 MG: 50 INJECTION, SOLUTION INTRAVENOUS at 08:12

## 2023-05-26 RX ADMIN — LEVETIRACETAM 1000 MG: 100 SOLUTION ORAL at 08:19

## 2023-05-26 RX ADMIN — LEVETIRACETAM 500 MG: 500 TABLET, FILM COATED ORAL at 19:53

## 2023-05-26 RX ADMIN — ACETAMINOPHEN 975 MG: 325 TABLET ORAL at 16:50

## 2023-05-26 RX ADMIN — SODIUM CHLORIDE: 0.9 INJECTION, SOLUTION INTRAVENOUS at 08:29

## 2023-05-26 RX ADMIN — PROPOFOL 30 MG: 10 INJECTION, EMULSION INTRAVENOUS at 08:30

## 2023-05-26 RX ADMIN — Medication 7.5 MG: at 08:56

## 2023-05-26 RX ADMIN — Medication 2 G: at 08:44

## 2023-05-26 RX ADMIN — ONDANSETRON 4 MG: 2 INJECTION INTRAMUSCULAR; INTRAVENOUS at 13:31

## 2023-05-26 RX ADMIN — CEFAZOLIN 1 G: 1 INJECTION, POWDER, FOR SOLUTION INTRAMUSCULAR; INTRAVENOUS at 21:05

## 2023-05-26 RX ADMIN — SODIUM CHLORIDE, POTASSIUM CHLORIDE, SODIUM LACTATE AND CALCIUM CHLORIDE: 600; 310; 30; 20 INJECTION, SOLUTION INTRAVENOUS at 12:29

## 2023-05-26 RX ADMIN — PROPOFOL 100 MCG/KG/MIN: 10 INJECTION, EMULSION INTRAVENOUS at 08:27

## 2023-05-26 RX ADMIN — Medication 2 G: at 13:31

## 2023-05-26 RX ADMIN — Medication 0.5 G: at 09:49

## 2023-05-26 RX ADMIN — MANNITOL 450 ML: 20 INJECTION, SOLUTION INTRAVENOUS at 10:09

## 2023-05-26 RX ADMIN — HYDROMORPHONE HYDROCHLORIDE 0.5 MG: 1 INJECTION, SOLUTION INTRAMUSCULAR; INTRAVENOUS; SUBCUTANEOUS at 13:23

## 2023-05-26 RX ADMIN — REMIFENTANIL HYDROCHLORIDE 0.1 MCG/KG/MIN: 1 INJECTION, POWDER, LYOPHILIZED, FOR SOLUTION INTRAVENOUS at 08:19

## 2023-05-26 RX ADMIN — DEXAMETHASONE 4 MG: 4 TABLET ORAL at 17:46

## 2023-05-26 RX ADMIN — LIDOCAINE HYDROCHLORIDE 100 MG: 20 INJECTION, SOLUTION INFILTRATION; PERINEURAL at 08:12

## 2023-05-26 RX ADMIN — OXYCODONE HYDROCHLORIDE 5 MG: 5 TABLET ORAL at 19:53

## 2023-05-26 RX ADMIN — PROPOFOL 30 MG: 10 INJECTION, EMULSION INTRAVENOUS at 14:06

## 2023-05-26 RX ADMIN — SENNOSIDES AND DOCUSATE SODIUM 1 TABLET: 8.6; 5 TABLET ORAL at 19:53

## 2023-05-26 RX ADMIN — Medication 5 MG: at 08:11

## 2023-05-26 RX ADMIN — SENNOSIDES AND DOCUSATE SODIUM 1 TABLET: 50; 8.6 TABLET ORAL at 00:03

## 2023-05-26 RX ADMIN — PROPOFOL 50 MG: 10 INJECTION, EMULSION INTRAVENOUS at 08:27

## 2023-05-26 RX ADMIN — Medication 5 MG: at 08:13

## 2023-05-26 RX ADMIN — PROPOFOL 40 MG: 10 INJECTION, EMULSION INTRAVENOUS at 08:29

## 2023-05-26 RX ADMIN — DEXAMETHASONE SODIUM PHOSPHATE 4 MG: 4 INJECTION, SOLUTION INTRAMUSCULAR; INTRAVENOUS at 02:22

## 2023-05-26 RX ADMIN — SODIUM CHLORIDE, SODIUM LACTATE, POTASSIUM CHLORIDE, CALCIUM CHLORIDE: 600; 310; 30; 20 INJECTION, SOLUTION INTRAVENOUS at 08:29

## 2023-05-26 RX ADMIN — NOREPINEPHRINE BITARTRATE 4 MG/250 ML (16 MCG/ML) IN 0.9 % NACL IV 0.01 MCG/KG/MIN: at 08:28

## 2023-05-26 ASSESSMENT — ACTIVITIES OF DAILY LIVING (ADL)
ADLS_ACUITY_SCORE: 20
ADLS_ACUITY_SCORE: 21
ADLS_ACUITY_SCORE: 23
ADLS_ACUITY_SCORE: 21
ADLS_ACUITY_SCORE: 21
ADLS_ACUITY_SCORE: 26
ADLS_ACUITY_SCORE: 21
ADLS_ACUITY_SCORE: 21
ADLS_ACUITY_SCORE: 26
ADLS_ACUITY_SCORE: 21
ADLS_ACUITY_SCORE: 26
ADLS_ACUITY_SCORE: 20

## 2023-05-26 NOTE — PROGRESS NOTES
Neurosurgery pre op note    Patient seen and examined at pre op. Denies HA, denies episodes of weakness or visual disturbance since admission. Feels nervous but ready for surgery. Had more episodes of bradycardia but patient denies symptoms     Physical exam:   Blood pressure 135/51, pulse (!) 48, temperature 97  F (36.1  C), temperature source Temporal, resp. rate 12, weight 93.9 kg (207 lb 0.2 oz), SpO2 98 %.  NEUROLOGIC:  -- Awake; Alert; oriented x 3  -- Follows commands briskly  -- +repetition, calculation, and naming x3  -- Speech fluent, spontaneous. No aphasia or dysarthria. Able to follow 2 step command  -- no gaze preference. No apparent hemineglect.  Cranial Nerves:  -- visual fields full to confrontation, PERRL 3-2mm bilat and brisk, extraocular movements intact  -- face symmetrical, tongue midline  -- sensory V1-V3 intact bilaterally  -- palate elevates symmetrically, uvula midline  -- hearing grossly intact bilat  -- Cerebellar: Finger nose finger without dysmetria, intact rapid alternating motions bilaterally    Motor:  Normal bulk / tone; no tremor, rigidity, or bradykinesia.    No Pronator Drift  Full strength x4    Gait: Deferred      A/P: Ms. Frost is a 63 year old female presented with right side weakness and visual disturbance found to have a large parietal CE mass with negative CT CAP.   Will proceed with OR today. Patient discussed with Dr. Porfirio Calero   Neurosurgery

## 2023-05-26 NOTE — ANESTHESIA PROCEDURE NOTES
Airway       Patient location during procedure: OR       Procedure Start/Stop Times: 5/26/2023 8:15 AM  Staff -        CRNA: Andi Mchugh APRN CRNA       Performed By: CRNA  Consent for Airway        Urgency: elective  Indications and Patient Condition       Indications for airway management: gonsalo-procedural       Induction type:intravenous       Mask difficulty assessment: 1 - vent by mask    Final Airway Details       Final airway type: endotracheal airway       Successful airway: Single subglottic suction  Endotracheal Airway Details        ETT size (mm): 7.0       Cuffed: yes       Successful intubation technique: video laryngoscopy       VL Blade Size: Glidescope 3       Grade View of Cords: 1       Adjucts: stylet       Position: Right       Measured from: gums/teeth       Secured at (cm): 21       Bite block used: None    Post intubation assessment        Placement verified by: capnometry, equal breath sounds and chest rise        Number of attempts at approach: 1       Number of other approaches attempted: 0       Secured with: pink tape       Ease of procedure: easy       Dentition: Intact    Medication(s) Administered   Medication Administration Time: 5/26/2023 8:15 AM

## 2023-05-26 NOTE — ANESTHESIA CARE TRANSFER NOTE
Patient: Sarah Frost    Procedure: Procedure(s):  CRANIOTOMY, USING OPTICAL TRACKING SYSTEM, WITH NEOPLASM EXCISION STEALTH ASSISTED LEFT FRONTOPARIETAL CRANIOTOMY WITH RESCETION OF BRAIN TUMOR       Diagnosis: Brain mass [G93.89]  Diagnosis Additional Information: No value filed.    Anesthesia Type:   General     Note:    Oropharynx: oropharynx clear of all foreign objects and spontaneously breathing  Level of Consciousness: drowsy  Oxygen Supplementation: nasal cannula  Level of Supplemental Oxygen (L/min / FiO2): 4  Independent Airway: airway patency satisfactory and stable  Dentition: dentition unchanged  Vital Signs Stable: post-procedure vital signs reviewed and stable  Report to RN Given: handoff report given  Patient transferred to: PACU  Comments: Following commands all 4 ext.  Handoff Report: Identifed the Patient, Identified the Reponsible Provider, Reviewed the pertinent medical history, Discussed the surgical course, Reviewed Intra-OP anesthesia mangement and issues during anesthesia, Set expectations for post-procedure period and Allowed opportunity for questions and acknowledgement of understanding      Vitals:  Vitals Value Taken Time   /88 05/26/23 1426   Temp     Pulse 93 05/26/23 1430   Resp 15 05/26/23 1430   SpO2 99 % 05/26/23 1430   Vitals shown include unvalidated device data.    Electronically Signed By: JOSE LUIS Schulte CRNA  May 26, 2023  2:32 PM

## 2023-05-26 NOTE — PROGRESS NOTES
Post op note    POD0 s/p LEFT FRONTOPARIETAL CRANIOTOMY WITH RESCETION OF BRAIN TUMOR with Dr. Monroy    PLAN  -ICU with neuro and vital checks as ordered  -HOB30  -SBP<160  -MRI tomorrow   -Pain management   -Decadron 4q6  -Continue Keppra 500 BID  -PT, OT consulted; appreciate involvement  -Plans reviewed with Dr. Porfirio ANGULO [facility] Neurosurgery  [address] [address]    Tel [phone]

## 2023-05-26 NOTE — PROGRESS NOTES
Neurosurgery post op check    Patient seen and examined at PACU. Still groggy but states she is happy she doesn't have a breathing tube. Endorses some left side incisional pain. Denies any paresthesia    Sleepy, oriented x2, states i'ts 1923 but self corrects to 2023  Follow commands briskly  Speech fluent, spontaneous. No aphasia or dysarthria.  No gaze preference  PERRL 3-2mm bilat and brisk, extraocular movements intact  face symmetrical, tongue midline  sensory V1-V3 intact bilaterally  No pronator drift  Full strength BUE and BLE    Ms. Frost is a 63 year old female presented with right side weakness and visual disturbance found to have a large parietal CE mass s/p left parietal craniotomy with tumor resection on 5/26/23    PLAN  -ICU with neuro and vital checks as ordered  -HOB30  -SBP<160  -KATELYN drain to bulb suction  -MRI tomorrow   -Pain management   -Decadron 4q6  -Continue Keppra 500 BID  -PT, OT consulted; appreciate involvement  -Plans reviewed with Dr. Porfirio Calero  Neurosurgery

## 2023-05-26 NOTE — PROGRESS NOTES
Hospitalist brief note.     Patient in the OR today. Hospitalist service will follow up tomorrow.

## 2023-05-26 NOTE — PLAN OF CARE
Patient alert and oriented x4. Neuros intact. She denies any dizziness, visual changes, or any pain. VSS except Bradycardia; asymptomatic. On 2L overnight. She voids adequately. NPO since midnight. Plan is for Craniotomy and resection this morning, 0730.   0630: Patient off the floor with preop nurses

## 2023-05-26 NOTE — OP NOTE
Date of surgery: 05/26/23    Surgeon: Jude Monroy MD  Assistant:  Martha Calero MD - Assisting  (Note: The assistant was present for and assisted with the entire surgery, and his/her role as an assistant was crucial for aid in positioning, exposure, suctioning, retraction, and closure)      Preoperative diagnosis:   1. Left Parietal brain tumor  2. Cerebral Oedema    Postoperative diagnosis:  1.  Left Parietal  brain tumor likely primary brain tumor      Procedure:  1. Left Parietal craniotomy for resection of brain tumor  2. Use of [employer] Stealth intra-operative neuro-navigation with MRI guidance   3. Use of intraoperative microscope.  4. Use of intraoperative neuro-monitoring (subcortical stimulation/phase reversal)      EBL: 200 mL      Indications:   Ms. Frost is a 63 year old female presented with right side weakness and visual disturbance found to have a large parietal CE mass with negative CT CAP.    Her MRI revealed large left parietal cortical-based tumor causing mass effect and edema.  She improved significantly with steroids and became in asymptomatic during the admission.      We discussed the potential benefits of surgery and the risks of surgery for a mass this large including seizures, hemorrhage, stroke with paralysis, hematoma, infection, incomplete resection, need for further surgeries, dissemination of the tumor, MI, DVT/PE, or even death. He understands the risks and understands that the potential benefits far exceed the risks at this time.All their questions were answered, and she wished to proceed with surgery.      Description of surgery:   The patient was wheeled into the OR.  General anesthesia was induced followed by placement of Lea's catheter.  The patient was positioned supine with the head slightly turned to right and neck flexed using a shoulder bump  in Angulo pins, to expose the left parietal region.  [employer] Stealth navigation was registered with MRI guidance.  All the  pressure points were padded well.  Sterile prepping and draping procedures were performed after marking the incision using Stealth navigation.  Antibiotics were administered and timeout was performed.  The 10 blade was used to perform a curvilinear incision starting in front of the ear to the midline and across the midline posteriorly along the lambdoid suture. Skin flap was elevated and retracted using cerebellar retractors.  Periosteum was elevated and the area of interest was exposed using Stealth navigation.    Tumor was mapped out uing neuronavigation and a left parietal craniotomy was performed using 3 ike holes which were created using no 6 cutting ike and craniotome was used to complete the craniotomy.  Craniotomy was performed close to the midline up to the sagittal suture and posteriorly up to the lambdoid suture.  There were abnormal vessels on the dural surface likely tumor feeding vessels.  These vessels were coagulated.  The dura was opened with the geralds and metzenbaum scissors in a C-shaped manner and retracted using No 4-0 nurolon.  The lesion was visible on the brain surface The lesion's  location confirmed with navigation.  We then identified the central sulcus using phase reversal which was identified between contact 4 and 5 which was anterior to the craniotomy.  The postcentral gyrus was exposed and carefully preserved.    The surface of the tumor was coagulated and sent for the frozen section.  The tumor capsule was then extensively coagulated and mapped out using stereotactic navigation.  After identifying the anterior posterior and lateral borders of the tumor capsule we started with the internal debulking of the tumor using bipolar cautery, suction and sono PET.  The tumor was fibrous with minimal vascularity and well-defined capsule.  The brain surrounding the tumor was gliotic and carefully dissected.  The tumor was densely at adhered to the dura and up to the midline falx.       After  the internal debulking the capsule of the tumor was identified and dissected all around starting from anterior, lateral, posterior and along the medial falx at the end.  Tumor capsule was found and well delineated from the surrounding brain.   Tumor was meticulously dissected all around from the critical surrounding neurovascular structures while maintaining the capsule.  Tumor was densely adhered at the level of the falx which was coagulated, scraped using curettes and coagulated using bipolar cautery.  At the end of the resection subcortical stimulation was performed at 6 mA and no stimulation was detected.  Cortical stimulation at 6 mA was observed anterior to the craniotomy.The postcentral gyrus was meticulously preserved.  The vein going into the superior sagittal sinus was identified both posterior and anterior to the tumor and meticulously preserved.      Following satisfactory gross total resection of the mass using neuro navigation, hemostasis was achieved using Surgicel, Floseal and thrombin-soaked Gelfoam.  The brain was completely relaxed at the end of the procedure.  The resection cavity was irrigated with warm saline and filled.  We then placed the subdural DuraGen and achieved a dural closure above it  using 4-0 Nurolon.  The bone flap was fixed back into place with the cranial plating system.  Antibiotic irrigation was performed, the galea was closed with 2-0 Vicryls, and the skin was closed with 4-0 monocryl.   #10 channel drain was placed in the subgaleal space.  Dressing was  applied using Xeroform, Telfa and medpore tape.     Patient withstood the procedure well and transferred to ICU following extubation in a stable condition.There were no intraprocedural complications.    I updated the patient's family after the procedure.

## 2023-05-26 NOTE — ANESTHESIA PROCEDURE NOTES
Central Line/PA Catheter Placement    Pre-Procedure   Staff -        Anesthesiologist:  Austin Bravo MD       Performed By: anesthesiologist       Location: pre-op       Pre-Anesthestic Checklist: patient identified, IV checked, site marked, risks and benefits discussed, informed consent, monitors and equipment checked and pre-op evaluation  Timeout:       Correct Patient: Yes        Correct Procedure: Yes        Correct Site: Yes        Correct Position: Yes        Correct Laterality: N/A   Line Placement:   This line was placed Pre Induction starting at 5/26/2023 7:25 AM and ending at 5/26/2023 7:25 AM    Procedure   Procedure: central line       Laterality: left       Insertion Site: femoral.       Patient Position: Trendelenburg  Sterile Prep        All elements of maximal sterile barrier technique followed       Patient Prep/Sterile Barriers: draped, patient draped, hand hygiene, gloves , hat , mask , draped, gown, sterile gel and probe cover       Skin prep: Chloraprep  Insertion/Injection        Local skin infiltrated with mL of 1% lidocaine.        Technique: ultrasound guided and Seldinger Technique        1. Ultrasound was used to evaluate the access site.       2. Vein evaluated via ultrasound for patency/adequacy.       3. Using real-time ultrasound the needle/catheter was observed entering the artery/vein.       4. Permanent image was captured and entered into the patient's record.       5. The visualized structures were anatomically normal.       6. There were no apparent abnormal pathologic findings.       Type: CVC       Catheter Size: 7 Fr       Catheter Length: 20       Number of Lumens: triple lumen  Narrative         Secured by: suture       Tegaderm and Biopatch dressing used.       Complications: None apparent,        blood aspirated from all lumens,        All lumens flushed: Yes       Verification method: Placement to be verified post-op, X-ray and Ultrasound   Comments:  No  complications noted. Images were saved.

## 2023-05-26 NOTE — ANESTHESIA PROCEDURE NOTES
Arterial Line Procedure Note    Pre-Procedure   Staff -        Anesthesiologist:  Austin Bravo MD       Resident/Fellow: Manjeet Vogel APRN CRNA       Performed By: anesthesiologist       Location: pre-op       Pre-Anesthestic Checklist: patient identified, IV checked, risks and benefits discussed, informed consent, monitors and equipment checked and pre-op evaluation  Timeout:       Correct Patient: Yes        Correct Procedure: Yes        Correct Site: Yes        Correct Position: Yes   Line Placement:   This line was placed Pre Induction starting at 5/26/2023 7:15 AM and ending at 5/26/2023 7:15 AM  Procedure   Procedure: arterial line       Laterality: left       Insertion Site: radial.  Sterile Prep        Standard elements of sterile barrier followed       Skin prep: Chloraprep  Insertion/Injection        Technique: ultrasound guided        1. Ultrasound was used to evaluate the access site.       2. Artery evaluated via ultrasound for patency/adequacy.       3. Using real-time ultrasound the needle/catheter was observed entering the artery/vein.       5. The visualized structures were anatomically normal.       6. There were no apparent abnormal pathologic findings.       Catheter Type/Size: 20 G, 1.75 in/4.5 cm quick cath (integral wire)  Narrative         Secured by: other       Tegaderm dressing used.       Complications: None apparent,        Arterial waveform: Yes    Comments:  Arterial line secured with a Tegaderm and tape. Sterile gloves, mask, hat, and sterile drape utilized. No IMAGES SAVED

## 2023-05-26 NOTE — BRIEF OP NOTE
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    Procedure: Procedure(s):  CRANIOTOMY, USING OPTICAL TRACKING SYSTEM, WITH NEOPLASM EXCISION STEALTH ASSISTED LEFT FRONTOPARIETAL CRANIOTOMY WITH RESCETION OF BRAIN TUMOR  Surgeon: Surgeon(s) and Role:     * Jude Monroy MD - Primary     * Martha Calero MD - Assisting  Anesthesia: General   Estimated Blood Loss: 200 mL from 5/26/2023  8:02 AM to 5/26/2023  2:24 PM      Drains: No 10 Channel drain  Specimens:   ID Type Source Tests Collected by Time Destination   1 : left parietal tumor Tissue Brain SURGICAL PATHOLOGY EXAM Jude Monroy MD 5/26/2023 10:53 AM    2 : left parietal tumor Tissue Brain SURGICAL PATHOLOGY EXAM Jude Monroy MD 5/26/2023 11:47 AM      Findings:   Tumor was fibrous, densely attached to the dura and falx.  Complications: None.  Implants:   Implant Name Type Inv. Item Serial No.  Lot No. LRB No. Used Action   GRAFT DURAGEN DURAL MATRIX 4X5CM [id number] - [id number] Other GRAFT DURAGEN DURAL MATRIX 4X5CM ID-[id number]  [employer] [id number] Left 1 Implanted   IMP PLATE SYN JONAH HOLE COVER 17MM 04.503.023 - [id number] Metallic Hardware/[place] IMP PLATE SYN JONAH HOLE COVER 17MM 04.503.023  [employer]AT[employer] [id number] 23 41 09 Left 3 Implanted   IMP SCR SYN MATRIX LOW PRO 1.5X04MM SELF DRILL 04.503.104.01 - [id number] Metallic Hardware/[place] IMP SCR SYN MATRIX LOW PRO 1.5X04MM SELF DRILL [id number]  [employer]AT[employer] 24MAY 23 41 09 Left 13 Implanted   IMP PLATE SYN MATRIXNEURO STR 2 HOLE 12MM  04.503.062 - [id number] Metallic Hardware/[place] IMP PLATE SYN MATRIXNEURO STR 2 HOLE 12MM  04.503.062  [employer] [id number] Left 1 Implanted

## 2023-05-27 ENCOUNTER — APPOINTMENT (OUTPATIENT)
Dept: PHYSICAL THERAPY | Facility: CLINIC | Age: 63
DRG: 025 | End: 2023-05-27
Attending: PHYSICIAN ASSISTANT
Payer: COMMERCIAL

## 2023-05-27 ENCOUNTER — APPOINTMENT (OUTPATIENT)
Dept: OCCUPATIONAL THERAPY | Facility: CLINIC | Age: 63
DRG: 025 | End: 2023-05-27
Attending: PHYSICIAN ASSISTANT
Payer: COMMERCIAL

## 2023-05-27 ENCOUNTER — APPOINTMENT (OUTPATIENT)
Dept: MRI IMAGING | Facility: CLINIC | Age: 63
DRG: 025 | End: 2023-05-27
Attending: PHYSICIAN ASSISTANT
Payer: COMMERCIAL

## 2023-05-27 LAB
ANION GAP SERPL CALCULATED.3IONS-SCNC: 13 MMOL/L (ref 7–15)
BUN SERPL-MCNC: 16.5 MG/DL (ref 8–23)
CALCIUM SERPL-MCNC: 9.6 MG/DL (ref 8.8–10.2)
CHLORIDE SERPL-SCNC: 104 MMOL/L (ref 98–107)
CREAT SERPL-MCNC: 0.8 MG/DL (ref 0.51–0.95)
DEPRECATED HCO3 PLAS-SCNC: 23 MMOL/L (ref 22–29)
GFR SERPL CREATININE-BSD FRML MDRD: 82 ML/MIN/1.73M2
GLUCOSE BLDC GLUCOMTR-MCNC: 122 MG/DL (ref 70–99)
GLUCOSE BLDC GLUCOMTR-MCNC: 124 MG/DL (ref 70–99)
GLUCOSE SERPL-MCNC: 123 MG/DL (ref 70–99)
POTASSIUM SERPL-SCNC: 4.1 MMOL/L (ref 3.4–5.3)
SODIUM SERPL-SCNC: 140 MMOL/L (ref 136–145)

## 2023-05-27 PROCEDURE — 250N000013 HC RX MED GY IP 250 OP 250 PS 637: Performed by: PHYSICIAN ASSISTANT

## 2023-05-27 PROCEDURE — 250N000013 HC RX MED GY IP 250 OP 250 PS 637: Performed by: EMERGENCY MEDICINE

## 2023-05-27 PROCEDURE — 97530 THERAPEUTIC ACTIVITIES: CPT | Mod: GP | Performed by: PHYSICAL THERAPIST

## 2023-05-27 PROCEDURE — 250N000013 HC RX MED GY IP 250 OP 250 PS 637: Performed by: INTERNAL MEDICINE

## 2023-05-27 PROCEDURE — 97161 PT EVAL LOW COMPLEX 20 MIN: CPT | Mod: GP | Performed by: PHYSICAL THERAPIST

## 2023-05-27 PROCEDURE — A9585 GADOBUTROL INJECTION: HCPCS | Performed by: HOSPITALIST

## 2023-05-27 PROCEDURE — 97535 SELF CARE MNGMENT TRAINING: CPT | Mod: GO

## 2023-05-27 PROCEDURE — 250N000011 HC RX IP 250 OP 636: Performed by: PHYSICIAN ASSISTANT

## 2023-05-27 PROCEDURE — 99232 SBSQ HOSP IP/OBS MODERATE 35: CPT | Performed by: INTERNAL MEDICINE

## 2023-05-27 PROCEDURE — 97165 OT EVAL LOW COMPLEX 30 MIN: CPT | Mod: GO

## 2023-05-27 PROCEDURE — 255N000002 HC RX 255 OP 636: Performed by: HOSPITALIST

## 2023-05-27 PROCEDURE — 80048 BASIC METABOLIC PNL TOTAL CA: CPT | Performed by: INTERNAL MEDICINE

## 2023-05-27 PROCEDURE — 250N000013 HC RX MED GY IP 250 OP 250 PS 637: Performed by: HOSPITALIST

## 2023-05-27 PROCEDURE — 70553 MRI BRAIN STEM W/O & W/DYE: CPT

## 2023-05-27 PROCEDURE — 250N000012 HC RX MED GY IP 250 OP 636 PS 637: Performed by: PHYSICIAN ASSISTANT

## 2023-05-27 PROCEDURE — 97116 GAIT TRAINING THERAPY: CPT | Mod: GP | Performed by: PHYSICAL THERAPIST

## 2023-05-27 PROCEDURE — 120N000013 HC R&B IMCU

## 2023-05-27 RX ORDER — DEXAMETHASONE SODIUM PHOSPHATE 4 MG/ML
4 INJECTION, SOLUTION INTRA-ARTICULAR; INTRALESIONAL; INTRAMUSCULAR; INTRAVENOUS; SOFT TISSUE EVERY 24 HOURS
Status: DISCONTINUED | OUTPATIENT
Start: 2023-05-29 | End: 2023-05-29 | Stop reason: HOSPADM

## 2023-05-27 RX ORDER — GADOBUTROL 604.72 MG/ML
10 INJECTION INTRAVENOUS ONCE
Status: DISCONTINUED | OUTPATIENT
Start: 2023-05-27 | End: 2023-05-29 | Stop reason: HOSPADM

## 2023-05-27 RX ORDER — DEXAMETHASONE SODIUM PHOSPHATE 4 MG/ML
2 INJECTION, SOLUTION INTRA-ARTICULAR; INTRALESIONAL; INTRAMUSCULAR; INTRAVENOUS; SOFT TISSUE EVERY 24 HOURS
Status: DISCONTINUED | OUTPATIENT
Start: 2023-06-06 | End: 2023-05-29 | Stop reason: HOSPADM

## 2023-05-27 RX ORDER — DEXAMETHASONE SODIUM PHOSPHATE 4 MG/ML
4 INJECTION, SOLUTION INTRA-ARTICULAR; INTRALESIONAL; INTRAMUSCULAR; INTRAVENOUS; SOFT TISSUE EVERY 12 HOURS
Status: COMPLETED | OUTPATIENT
Start: 2023-05-27 | End: 2023-05-29

## 2023-05-27 RX ORDER — GADOBUTROL 604.72 MG/ML
9 INJECTION INTRAVENOUS ONCE
Status: COMPLETED | OUTPATIENT
Start: 2023-05-27 | End: 2023-05-27

## 2023-05-27 RX ORDER — DEXAMETHASONE SODIUM PHOSPHATE 4 MG/ML
3 INJECTION, SOLUTION INTRA-ARTICULAR; INTRALESIONAL; INTRAMUSCULAR; INTRAVENOUS; SOFT TISSUE EVERY 24 HOURS
Status: DISCONTINUED | OUTPATIENT
Start: 2023-06-02 | End: 2023-05-29 | Stop reason: HOSPADM

## 2023-05-27 RX ADMIN — POLYETHYLENE GLYCOL 3350 17 G: 17 POWDER, FOR SOLUTION ORAL at 08:13

## 2023-05-27 RX ADMIN — GADOBUTROL 9 ML: 604.72 INJECTION INTRAVENOUS at 09:50

## 2023-05-27 RX ADMIN — DEXAMETHASONE SODIUM PHOSPHATE 4 MG: 4 INJECTION, SOLUTION INTRAMUSCULAR; INTRAVENOUS at 12:50

## 2023-05-27 RX ADMIN — SENNOSIDES AND DOCUSATE SODIUM 1 TABLET: 8.6; 5 TABLET ORAL at 08:14

## 2023-05-27 RX ADMIN — CEFAZOLIN 1 G: 1 INJECTION, POWDER, FOR SOLUTION INTRAMUSCULAR; INTRAVENOUS at 05:49

## 2023-05-27 RX ADMIN — ACETAMINOPHEN 975 MG: 325 TABLET ORAL at 00:08

## 2023-05-27 RX ADMIN — DEXAMETHASONE 4 MG: 4 TABLET ORAL at 00:09

## 2023-05-27 RX ADMIN — METHOCARBAMOL 1000 MG: 500 TABLET ORAL at 08:11

## 2023-05-27 RX ADMIN — ACETAMINOPHEN 975 MG: 325 TABLET ORAL at 16:15

## 2023-05-27 RX ADMIN — DEXAMETHASONE 4 MG: 4 TABLET ORAL at 05:50

## 2023-05-27 RX ADMIN — LEVETIRACETAM 500 MG: 500 TABLET, FILM COATED ORAL at 08:12

## 2023-05-27 RX ADMIN — METHOCARBAMOL 1000 MG: 500 TABLET ORAL at 22:48

## 2023-05-27 RX ADMIN — OXYCODONE HYDROCHLORIDE 10 MG: 5 TABLET ORAL at 05:49

## 2023-05-27 RX ADMIN — METHOCARBAMOL 1000 MG: 500 TABLET ORAL at 13:47

## 2023-05-27 RX ADMIN — LEVETIRACETAM 500 MG: 500 TABLET, FILM COATED ORAL at 20:07

## 2023-05-27 RX ADMIN — OXYCODONE HYDROCHLORIDE 10 MG: 5 TABLET ORAL at 00:08

## 2023-05-27 RX ADMIN — SENNOSIDES AND DOCUSATE SODIUM 1 TABLET: 8.6; 5 TABLET ORAL at 20:07

## 2023-05-27 RX ADMIN — ACETAMINOPHEN 975 MG: 325 TABLET ORAL at 08:12

## 2023-05-27 RX ADMIN — CEFAZOLIN 1 G: 1 INJECTION, POWDER, FOR SOLUTION INTRAMUSCULAR; INTRAVENOUS at 12:10

## 2023-05-27 ASSESSMENT — ACTIVITIES OF DAILY LIVING (ADL)
ADLS_ACUITY_SCORE: 26
ADLS_ACUITY_SCORE: 26
ADLS_ACUITY_SCORE: 23
ADLS_ACUITY_SCORE: 23
ADLS_ACUITY_SCORE: 22
ADLS_ACUITY_SCORE: 22
ADLS_ACUITY_SCORE: 26
ADLS_ACUITY_SCORE: 23
ADLS_ACUITY_SCORE: 22
ADLS_ACUITY_SCORE: 23
PREVIOUS_RESPONSIBILITIES: MEAL PREP;HOUSEKEEPING;LAUNDRY;SHOPPING;MEDICATION MANAGEMENT;FINANCES;DRIVING;YARDWORK

## 2023-05-27 NOTE — PLAN OF CARE
0700 - 1530 RN Shift Summary    Patient's neuro exam WDL except from new numbness in right foot, unsteady gait related to that numbness, some proprioceptive difficulties again related to right foot and intermittent short-term forgetfulness. Neurosurgery aware of symptoms, no new orders at this time, continue to monitor.   Bradycardic, mostly upper 40s - lower 50s. BP WDL; No ectopy noted. Pulses good, palpable. Normothermic.   Occasionally on 2L O2 nasal cannula due to desaturation to 88%; Off supplemental oxygen since noon, sats now WDL. Lung sounds clear.  Ate two meals, good appetite. Blood glucose WDL. Stool softeners given, bowel sounds active, however no BM at this time.  Lea catheter removed per protocol; Great urine output prior to removal of catheter; Patient able to void small amount of urine after removal.   Skin WDL; UTV surgical incision, dressing clean, dry, intact. 10-20 mL output every two hours out of KATELYN drain. Placed to gravity drainage per Dr Monroy.   Central lines removed per protocol.  Family at bedside for most of the day; Updated; All questions answered and concerns addressed.

## 2023-05-27 NOTE — PLAN OF CARE
Neuro: Neuros intact, oxycodone and tylenol given for pain at incision site   CV: SB, BP WNL   Respiratory: on RA, LS clear   GI/: good UOP with avelar, no BM, KATELYN drain to suction   Skin: Crani Incision C/D/I   Activity: Dangled at bedside, shifts weight independently in bed   Diet: Tolerating Regular diet   Plan: Post-op MRI, transfer to floor

## 2023-05-27 NOTE — PLAN OF CARE
POD1 craniotomy for tumor resection. A&O x4.  Neuros intact x R foot numbness which is unchanged. VSS on RA. Head gauze C/D/I. KATELYN to gravity with dark red output. Up w/ SBA. Tele: SB. Given tylenol for incisional pain. DTV by 2100. Tolerating regular diet. Takes pills whole.

## 2023-05-27 NOTE — PROGRESS NOTES
"   05/27/23 1124   Appointment Info   Signing Clinician's Name / Credentials (PT) Leona Wolfe PT, DPT   Living Environment   People in Home spouse   Current Living Arrangements house   Home Accessibility stairs to enter home;stairs within home   Transportation Anticipated family or friend will provide   Living Environment Comments Reports her  will be home, her daughters will help to prn. Two story home, but can be mianly on one level prn.   Self-Care   Usual Activity Tolerance good   Current Activity Tolerance moderate   Regular Exercise Yes   Activity/Exercise Type walking;other (see comments)  (Eliptical, rower and TM avaliable, also walks the dog. The last two weeks activity has been limited. Spouse reports they did walk the dot 2 miles about a week ago.)   Exercise Amount/Frequency 3-5 times/wk   Equipment Currently Used at Home none   Fall history within last six months yes   Number of times patient has fallen within last six months   (1)   Activity/Exercise/Self-Care Comment Reports independence at baseline, Likes to travel. Pt is a retired PTA. States \"I retired when covid hit.\" Pt worked at [employer] for years.   General Information   Onset of Illness/Injury or Date of Surgery 05/23/23   Referring Physician Siva Mi MD   Patient/Family Therapy Goals Statement (PT) Agree to acute rehab, but also  hopeful for improved recovery and possibly home at disch.   Pertinent History of Current Problem (include personal factors and/or comorbidities that impact the POC) 63 year old female admitted on 5/23/2023 with intermittent right sided weakness. CT imaging found left parietal lobe mass with moderate vasogenic edema. Now POD #1 crani with resection of brain tumor. Intermittent R sided weakness suspected focal seizures related to mass.   Existing Precautions/Restrictions fall   Cognition   Affect/Mental Status (Cognition) WNL   Orientation Status (Cognition) oriented x 4   Follows " "Commands (Cognition) WFL   Safety Deficit (Cognition) impulsivity  (Responds well to cues to slow pace or wait for therapist to SBA/assist.)   Cognitive Status Comments Defer formal assessment to OT. Also ? SLP need. Disussed with RN. Pt with word finding difficulty at times, states \"I just knew the answer to that earlier,\" when asked the date. Able to figure it out with time. Reprots \"My cognition isn't right.\"   Pain Assessment   Patient Currently in Pain Yes, see Vital Sign flowsheet  (Tolerable, \"I know there is something on my head.\")   Integumentary/Edema   Integumentary/Edema Comments BAndage to the head, KATELYN drain to the head.   Posture    Posture Forward head position;Protracted shoulders   Range of Motion (ROM)   ROM Comment B LEs and UEs WFL   Strength (Manual Muscle Testing)   Strength Comments B LE MMTs for major muscle groups grossly assessed in sittin/5 at ankles, knees and hips. Functional strength with moblity, note R foot drop vs proprorecptive deficit and R sided preference/deviation.   Transfers   Comment, (Transfers) Sit<>Stand CGA   Gait/Stairs (Locomotion)   Comment, (Gait/Stairs) Bedside gait with WW, Min A for balance and walker navigation 10' with slow focused pace.   Balance   Balance Comments Sitting balance fair +, standing balance requires UE support B as well as cues for midline-R path deviation slight, quick to use stepping strategy for balance   Sensory Examination   Sensory Perception Comments R foot to the ankle unable to detect light otuch with gross assessment. 0% on the R, 100% on the L foot to ankle.   Coordination   Coordination Comments Proprioceptive devicit wtih limited sensation on the R LE-note foot occasionally has difficulty being placed, over shoots with reach to the walker and R UE.   Clinical Impression   Criteria for Skilled Therapeutic Intervention Yes, treatment indicated   PT Diagnosis (PT) Impaired Gait   Influenced by the following impairments Balance, " strength, proprioceptive and cognitive deficits.   Functional limitations due to impairments decreased fucntional independence with mobility.   Clinical Presentation (PT Evaluation Complexity) Stable/Uncomplicated   Clinical Presentation Rationale see MR   Clinical Decision Making (Complexity) low complexity   Planned Therapy Interventions (PT) balance training;bed mobility training;gait training;home exercise program;neuromuscular re-education;patient/family education;ROM (range of motion);postural re-education;stair training;strengthening;stretching;transfer training;progressive activity/exercise;home program guidelines   Risk & Benefits of therapy have been explained evaluation/treatment results reviewed;care plan/treatment goals reviewed;risks/benefits reviewed;current/potential barriers reviewed;participants voiced agreement with care plan;participants included;patient;spouse/significant other   PT Total Evaluation Time   PT Eval, Low Complexity Minutes (63579) 10   Physical Therapy Goals   PT Frequency Daily   PT Predicted Duration/Target Date for Goal Attainment 06/02/23   PT Goals Bed Mobility;Transfers;Gait;Stairs;PT Goal 1   PT: Bed Mobility Supervision/stand-by assist;Supine to/from sit;Rolling   PT: Transfers Supervision/stand-by assist;Sit to/from stand;Bed to/from chair;Assistive device   PT: Gait Supervision/stand-by assist;Assistive device;Greater than 200 feet   PT: Stairs Supervision/stand-by assist;Greater than 10 stairs;Rail on both sides   PT: Goal 1 Pt will score > 19 on [facility] DGI with indiciation of decreased fall risk.   Interventions   Interventions Quick Adds Gait Training;Therapeutic Activity;Therapeutic Procedure;Neuromuscular Re-ed;Physical Perf Test/Measures   Therapeutic Procedure/Exercise   Treatment Detail/Skilled Intervention Reviewed benefit of AROM for circulaiton nd reduced stiffness. Pt complete s APs, LQS, GS and marching in sitting 5 reps each.   Therapeutic Activity  "  Therapeutic Activities: dynamic activities to improve functional performance Minutes (41197) 10   Treatment Detail/Skilled Intervention Cues to slow pace with sit<>stand for safety, CGA. Requests to stay up in the chair following activity.Alarm on, all needs inreach. Discussed dich rec with pt and spouse, edu on [facility] vs TCU with possiblity that once swelling is reduced, pt may be much more independent. Pt and spouse agree with rehab rec for now are hopeful to disch home. Edu on OOB in chair for meals, walk 3-4x per day with staff.   Gait Training   Gait Training Minutes (04853) 15   Treatment Detail/Skilled Intervention Gait training cues tatile and verbal for posture, visual for gaze and Min A for balance and walker navigation in hallway x 250'. SEcond assit for line managemetn. Path deviation R, at times, foot drop at times on the R. Cued for heel strike roll off of th etoe, however, pt then demonstrates a high marching pattern. STanding rest to regroup and just asked pt to \"walk normal,\" improved steppage, but still occasional foot drag during swing. Pt reports feels good to be up walking, but admits fatigue. Increasd time for lines, change of tele and VS. VSS pre, during and post gait.   PT Discharge Planning   PT Plan DGI ; courtney dynamic balance-R foot attention-numb, but functional use?   PT Discharge Recommendation (DC Rec) Acute Rehab Center-Motivated patient will benefit from intensive, interdisciplinary therapy.  Anticipate will be able to tolerate 3 hours of therapy per day   PT Rationale for DC Rec Pt will benefit from an intense level of rehab in a multidiciplinary setting to progress independence and safety with functional mobility, prior to return home with supportive . Good motivation, rehab potential and support. Pt was previously independent with mobility.   PT Brief overview of current status Gait Min A for balance; transfers; R foot is numb-foot drag at times during gait.   Total " Session Time   Timed Code Treatment Minutes 25   Total Session Time (sum of timed and untimed services) 35

## 2023-05-27 NOTE — PROGRESS NOTES
[facility]    Medicine Progress Note - Hospitalist Service    Date of Admission:  5/23/2023    Assessment & Plan   Sarah Frost is a 63 year old female admitted on 5/23/2023 with intermittent right sided weakness. CT imaging found left parietal lobe mass with moderate vasogenic edema     Left Parietal mass with moderate vasogenic edema with mass effect with 5mm right miller midline shif  S/p craniotomy, resection of brain tumor on 5/26  Intermittent R sided weakness suspected focal seizures related to mass  * CT head: cortically based left parietal lobe tumor with moderate associated vasogenic edema. Mass effect includes 5mm rightward midline shift, partial effacement of basilar cisterns without transtentorial herniation, partial effacement of the third and left lateral ventricles.  * CTA head/neck: no prox vessel occlusion or significant stenosis  * MRI brain-large large heterogeneously enhancing solid and cystic or necrotic lesion arising from the posterior superior aspect of the left frontal lobe, favored to be intra-axial. Primary differential considerations include high-grade primary brain neoplasm or metastasis. Moderate associated vasogenic edema.2.  4 mm ring-enhancing lesion is seen along the ventral base of the infundibulum, nonspecific. This could reflect an additional metastasis. Small plaque-like area of dural-based enhancement seen along the posterior aspect of the falx, nonspecific but  compatible with a meningioma (versus dural metastasis). 3.  2 mm nonenhancing focus posterior aspect of the pituitary likely reflecting a tiny cyst or cystically degenerated adenoma. Suggest further assessment with dedicated MRI of the sella.  * received decadron 10mg in ED  * EEG- abnormal due to the presence of focal delta slowing over the left posterior temporoparietal head region, consistent with structural abnormality there.  No electrographic seizures or epileptiform discharges were  recorded.  * CT chest/abd/pelvis-no occult malignancy noted  * CT Head repeated on 5/25 due to sinus bradycardia--unchanged when compared with previous imaging from 5/23  * Right sided weakness improved since admission and has not had recurrent episode  * s/p craniotomy with tumor resection as above by Dr. Monroy on 5/26  * Post op MRI reviewed with neurosurgery. Discussed with Kat Ayala PA-C regarding MRI finding--> Rim-enhancing lesion within the left paramedian sella/adenohypophysis series 15 image [id number] could represent a benign versus metastatic lesion of the adenohypophysis as before but remains indeterminate with some displacement of the pituitary stalk.  Dedicated MRI with thin section/dynamic imaging on follow-up could be helpful to assess. This is also noted on admission MRI  She will talk to Dr. Monroy and will discuss plans/recommendations.     - routine post op care per neurosurgery drain in place  - SBP <160  - continue with decadron 4mg q6h with plan of 2 week taper to off per neurosurgery  - keppra 500mg bid  - q2hr neuro checks during the day and q4 check at night--transferring to the floor  - HOB 30 deg  - PT/OT  - subcutaneous heparin for prophylaxis ok to start on Monday 5/29 per neurosurgery  - PT/OT eval  - care transition team consult for discharge planning  - f/u CBC in AM    Sinus bradycardia  Patient is asymptomatic from this and no new neuro changes at this time. However, HR were mostly 50s-60s since admission and now 40-50s  * CT head on 5/25 did now show changes prior finding.   * discussed with Dr. Gilbert cardiology, will notify cards if symptomatic/pauses and/or sustaining in the 30s   * She is now post op on 5/27 and HR remains in 40s-50s  - continue telemetry, need to monitor HR when she is up with therapy and with activity  - if persistent or any pauses, consider re consulting cards  - she is asymptomatic from this     Hx VSD and PVCs  Moderate aortic stenosis  Cardiology consulted  "for preop evaluation by neurosurgery. Echo completed showing LVEF of 60-65%, small L to R shunt, mild to moderate aortic stenosis.   * cardiology eval completed, no futher cardiac work up need prior to surgery. Recommend OP f/u in adult congenital clinic.     Constipation  - continue with scheduled Senna-colace and Miralax      Overweight: Estimated body mass index is 28.73 kg/m  as calculated from the following:    Height as of this encounter: 1.803 m (5' 11\").    Weight as of this encounter: 93.4 kg (206 lb).present on admission  - f/u as out pt.     Diet: Regular Diet Adult    DVT Prophylaxis: Pneumatic Compression Devices  Lea Catheter: PRESENT, indication: Strict 1-2 Hour I&O  Lines: PRESENT      CVC Triple Lumen Left Femoral-Site Assessment: WDL  Arterial Line 05/26/23 Radial-Site Assessment: WDL Except;Positional      Cardiac Monitoring: None  Code Status: Full Code      Clinically Significant Risk Factors        # Overweight: Estimated body mass index is 28.73 kg/m  as calculated from the following:    Height as of this encounter: 1.803 m (5' 11\").    Weight as of this encounter: 93.4 kg (206 lb)., PRESENT ON ADMISSION          Disposition Plan   Pending post op course likely in 3-4 days       Siva Mi MD  Hospitalist Service  [facility]  Securely message with [employer] (more info)  Text page via [facility] Paging/Directory   ______________________________________________________________________    Interval History   Reports she is doing good post op. Denies n/v or abdominal pain. Endorses constipation. She is afebrile. Denies cp or lightheadedness    Physical Exam   Vital Signs: Temp: 98.3  F (36.8  C) Temp src: Oral BP: 134/82 Pulse: 53   Resp: 21 SpO2: 95 % O2 Device: Nasal cannula Oxygen Delivery: 2 LPM  Weight: 206 lbs 0 oz    General Appearance: Alert, awake and no apparent distress  Respiratory: CTAB, no wheezing  Cardiovascular: regular rate and rhythm  GI: soft and " non-tender  Skin: warm and dry      Medical Decision Making       45 MINUTES SPENT BY ME on the date of service doing chart review, history, exam, documentation & further activities per the note.  MANAGEMENT DISCUSSED with the following over the past 24 hours: patient,family, RN and neurosurgery PA   NOTE(S)/MEDICAL RECORDS REVIEWED over the past 24 hours: neurosurgery note, op note  Tests ORDERED & REVIEWED in the past 24 hours:  - BMP  Tests personally interpreted in the past 24 hours:  - MRI showing as above      Data

## 2023-05-27 NOTE — PROGRESS NOTES
[facility]    Neurosurgery  Daily Note    Assessment & Plan   Procedure(s):  CRANIOTOMY, USING OPTICAL TRACKING SYSTEM, WITH NEOPLASM EXCISION STEALTH ASSISTED LEFT FRONTOPARIETAL CRANIOTOMY WITH RESCETION OF BRAIN TUMOR   1 Day Post-Op  Pain well controlled. Admits to right foot numbness. Denies new weakness, new or worsening pain, nausea, vomiting. Non focal exam, dressing on head is dry. KATELYN output 108 since midnight. Patient heading to MRI.     EXAM: MR BRAIN W/O and W CONTRAST  LOCATION: [facility]  DATE/TIME: 5/27/2023 9:51 AM CDT     INDICATION: Postop.  COMPARISON: Preoperative brain MRI 05/23/2023 head CT 05/25/2023 also preoperative.  CONTRAST: 9mL Gadavist  TECHNIQUE: Routine multiplanar multisequence head MRI without and with intravenous contrast.     FINDINGS: Since previous evaluation, interval postoperative changes of left parietal occipital craniotomy according to clinical history performed for resection/treatment of large heterogeneous enhancing left parietal supratentorial mass lesion, primary   surgical pathology as of time of this region reported as favor primary brain tumor type unspecified.     INTRACRANIAL CONTENTS: Since previous MRI/CT, previous identified bulky large enhancing mass appears to have been totally or near totally resected, with cavity of hypointense T1 hyperintense T2 signal fluid, recent blood products and associated marginal   enhancement about the cavity series 10 image 23 series 15 image 151. There is restricted diffusion along the margins of the resection cavity likely representing postoperative recent ischemia and/or restricted signal due to cavity blood products. This   could be evaluated on future follow-up with no additional recent ischemic changes intracranially elsewhere. Cavity measures about 5 cm ML dimension, 1.8 cm radially. The marginal enhancement on today's evaluation is presumed postoperative although    residual marginal neoplasm cannot be fully excluded. This examination can serve as a baseline for future follow-up studies. There is diminished mass effect upon the adjacent parenchyma status post resection including upon the posterior body and occipital   horn of the left lateral ventricle. Increased caliber specifically of the occipital horn of the left lateral ventricle with diminished left to right shift at the subfalcine level and posterior falx level, now measures about 4 mm. Nonenhancing T2/FLAIR   hypertense signal abnormality about the resection margins of the posterior left frontal lobe and anterior parietal lobe medially extending about the margins of the posterior body and occipital horn left lateral ventricle and involve the left splenium of   the corpus callosum as before likely representing residual peritumoral edema less likely nonenhancing infiltrative neoplasm. The extent of this T2 signal abnormality is stable from the preoperative study. Postoperative extra-axial gas signal overlies the   paramedian left frontal level. A small amount of extra-axial blood products/fluid deep to the craniotomy level measures about 6 mm series 11 image 19. Stable appearance to lobular dural left parafalcine level posteriorly series 13 image 23 may reflect a   small meningioma versus dural metastasis. Dural venous sinus enhancement shows no definite thrombosis with some areas of presumed extrinsic mass effect and/or discontinuity of the superior sagittal sinus enhancement series 14 image 93 at and to the left   of midline. Transverse sinus and sigmoid sinus enhancement is satisfactory. No pathologic enhancement the skull base level or upper cervical cord. Position of the cerebellar tonsils is satisfactory.     SELLA: Rim-enhancing lesion within the left paramedian sella/adenohypophysis series 15 image 94-96 could represent a benign versus metastatic lesion of the adenohypophysis as before but remains indeterminate  with some displacement of the pituitary stalk.   Dedicated MRI with thin section/dynamic imaging on follow-up could be helpful to assess.     OSSEOUS STRUCTURES/SOFT TISSUES: Soft tissue swelling overlies the craniotomy level with no abnormal diploic space or orbital enhancement. The major intracranial vascular flow voids are maintained.      ORBITS: No abnormality accounting for technique. No pathologic orbital enhancement.     SINUSES/MASTOIDS: Mild mucosal thickening scattered about the paranasal sinuses. Scattered fluid/membrane thickening in the mastoid air cells bilaterally.                                                                       IMPRESSION:  1.  Since previous MR/CT evaluations listed above, interval postoperative changes of left parietal occipital craniotomy with resection of previously identified heterogeneous enhancing large left parietal mass. 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     2.  Decreased mass effect status post tumor resection upon the left cerebral hemisphere left lateral ventricle and left corpus callosum with improved caliber of the occipital horn of the left lateral ventricle and diminished shift of midline.     3.  There is some in enhancement along the margins of the resection cavity with associated restricted diffusion presumed postoperative in nature. Residual marginal tumor cannot be fully excluded and today's evaluation can serve as a baseline for future   follow-up studies.     4.  Stable appearance to nonenhancing T2/FLAIR signal abnormality about the resection cavity margin extends about the posterior body and occipital horn of the left lateral ventricle stable from prior study probably represent peritumoral edema.   Nonenhancing infiltrative neoplasm is considered less likely but could be evaluated on follow-up study as well.     5.  Fluid/blood products, gas density at the left  supratentorial level including the resection cavity and overlying the paramedian left frontal lobe anteriorly presumed postoperative. Postoperative fluid deep to the craniotomy site with some dural   enhancement.     6.  Enhancement abnormality redemonstrated dural based left parafalcine level, and associated with the sella unchanged from preoperative study with stable diagnostic considerations and follow-up recommendations as listed above.     7.  There is no additional areas of recent infarction present intracranially beyond the resection cavity level.     8.  Additional details and full description are given above.      Plan:  -OK for floor status with q2H day neuro checks and q4H night neuro checks  -HOB30  -SBP<160  -KATELYN drain to bulb suction, monitor output   -Advance activity as tolerated  -Continue supportive and symptomatic treatment  -Start or continue physical therapy  -Pain control measures  -Decadron 4q6. 2 week taper to off.   -Continue Keppra 500 BID  -subcutaneous heparin to start Monday.   -PT, OT consulted; appreciate involvement  -Advance diet as tolerated  -Routine wound care    -Plans reviewed with Dr. Porfirio Arita PA-C  [facility] [place] [address]    Tel [phone]  Pager [phone]    Principal Problem:    Brain mass      Kat Arita PA-C    Interval History   Stable     Physical Exam   Temp: 98.3  F (36.8  C) Temp src: Oral BP: 125/77 Pulse: 53   Resp: 21 SpO2: 95 % O2 Device: Nasal cannula Oxygen Delivery: 2 LPM  Vitals:    05/25/23 0400 05/26/23 0500 05/27/23 0600   Weight: 200 lb 9.9 oz (91 kg) 207 lb 0.2 oz (93.9 kg) 206 lb (93.4 kg)     Vital Signs with Ranges  Temp:  [98  F (36.7  C)-98.5  F (36.9  C)] 98.3  F (36.8  C)  Pulse:  [39-95] 53  Resp:  [10-25] 21  BP: (125-140)/(72-88) 125/77  MAP:  [66 mmHg-102 mmHg] 85 mmHg  Arterial Line BP: ()/(43-79) 123/70  SpO2:  [93 %-100 %]  95 %  I/O last 3 completed shifts:  In: 4020 [P.O.:720; I.V.:3300]  Out: 4440 [Urine:4030; Drains:210; Blood:200]    Awake, alert, oriented x 3  Dressing on head is dry   II-XII grossly intact  5/5 motor strength BUE and BLE  Negative clonus  Negative pronator drift     Medications        acetaminophen  975 mg Oral Q8H     ceFAZolin  1 g Intravenous Q8H     dexamethasone  4 mg Intravenous Q6H BELIA    Or     dexamethasone  4 mg Oral Q6H BELIA     gadobutrol  10 mL Intravenous Once     levETIRAcetam  500 mg Oral BID     polyethylene glycol  17 g Oral Daily     senna-docusate  1 tablet Oral BID     sodium chloride (PF)  10 mL Intravenous Once     sodium chloride (PF)  3 mL Intracatheter Q8H     sodium chloride (PF)  3 mL Intracatheter Q8H       Plans discussed with Dr. Monryo who was in agreement with plans    Kat ANGULO [facility]  [address]  [address] [facility]  [place] [address]    Tel [phone]  Pager [phone]

## 2023-05-28 ENCOUNTER — APPOINTMENT (OUTPATIENT)
Dept: OCCUPATIONAL THERAPY | Facility: CLINIC | Age: 63
DRG: 025 | End: 2023-05-28
Attending: PHYSICIAN ASSISTANT
Payer: COMMERCIAL

## 2023-05-28 ENCOUNTER — APPOINTMENT (OUTPATIENT)
Dept: PHYSICAL THERAPY | Facility: CLINIC | Age: 63
DRG: 025 | End: 2023-05-28
Attending: PHYSICIAN ASSISTANT
Payer: COMMERCIAL

## 2023-05-28 LAB
ANION GAP SERPL CALCULATED.3IONS-SCNC: 8 MMOL/L (ref 7–15)
BUN SERPL-MCNC: 17.4 MG/DL (ref 8–23)
CALCIUM SERPL-MCNC: 9.9 MG/DL (ref 8.8–10.2)
CHLORIDE SERPL-SCNC: 103 MMOL/L (ref 98–107)
CREAT SERPL-MCNC: 0.95 MG/DL (ref 0.51–0.95)
DEPRECATED HCO3 PLAS-SCNC: 29 MMOL/L (ref 22–29)
ERYTHROCYTE [DISTWIDTH] IN BLOOD BY AUTOMATED COUNT: 11.9 % (ref 10–15)
GFR SERPL CREATININE-BSD FRML MDRD: 67 ML/MIN/1.73M2
GLUCOSE BLDC GLUCOMTR-MCNC: 109 MG/DL (ref 70–99)
GLUCOSE SERPL-MCNC: 94 MG/DL (ref 70–99)
HCT VFR BLD AUTO: 39 % (ref 35–47)
HGB BLD-MCNC: 12.9 G/DL (ref 11.7–15.7)
MCH RBC QN AUTO: 32.7 PG (ref 26.5–33)
MCHC RBC AUTO-ENTMCNC: 33.1 G/DL (ref 31.5–36.5)
MCV RBC AUTO: 99 FL (ref 78–100)
PLATELET # BLD AUTO: 176 10E3/UL (ref 150–450)
POTASSIUM SERPL-SCNC: 4.6 MMOL/L (ref 3.4–5.3)
RBC # BLD AUTO: 3.94 10E6/UL (ref 3.8–5.2)
SODIUM SERPL-SCNC: 140 MMOL/L (ref 136–145)
WBC # BLD AUTO: 8 10E3/UL (ref 4–11)

## 2023-05-28 PROCEDURE — 250N000011 HC RX IP 250 OP 636: Performed by: INTERNAL MEDICINE

## 2023-05-28 PROCEDURE — 97116 GAIT TRAINING THERAPY: CPT | Mod: GP

## 2023-05-28 PROCEDURE — 85027 COMPLETE CBC AUTOMATED: CPT | Performed by: INTERNAL MEDICINE

## 2023-05-28 PROCEDURE — 250N000013 HC RX MED GY IP 250 OP 250 PS 637: Performed by: INTERNAL MEDICINE

## 2023-05-28 PROCEDURE — 97530 THERAPEUTIC ACTIVITIES: CPT | Mod: GO

## 2023-05-28 PROCEDURE — 120N000001 HC R&B MED SURG/OB

## 2023-05-28 PROCEDURE — 97750 PHYSICAL PERFORMANCE TEST: CPT | Mod: GP

## 2023-05-28 PROCEDURE — 36415 COLL VENOUS BLD VENIPUNCTURE: CPT | Performed by: INTERNAL MEDICINE

## 2023-05-28 PROCEDURE — 999N000226 HC STATISTIC SLP IP EVAL DEFER

## 2023-05-28 PROCEDURE — 80048 BASIC METABOLIC PNL TOTAL CA: CPT | Performed by: INTERNAL MEDICINE

## 2023-05-28 PROCEDURE — 99232 SBSQ HOSP IP/OBS MODERATE 35: CPT | Performed by: INTERNAL MEDICINE

## 2023-05-28 RX ORDER — AMOXICILLIN 250 MG
2 CAPSULE ORAL 2 TIMES DAILY
Status: DISCONTINUED | OUTPATIENT
Start: 2023-05-28 | End: 2023-05-29 | Stop reason: HOSPADM

## 2023-05-28 RX ADMIN — LEVETIRACETAM 500 MG: 500 TABLET, FILM COATED ORAL at 08:38

## 2023-05-28 RX ADMIN — METHOCARBAMOL 1000 MG: 500 TABLET ORAL at 19:38

## 2023-05-28 RX ADMIN — DEXAMETHASONE SODIUM PHOSPHATE 4 MG: 4 INJECTION, SOLUTION INTRAMUSCULAR; INTRAVENOUS at 12:17

## 2023-05-28 RX ADMIN — SENNOSIDES AND DOCUSATE SODIUM 1 TABLET: 8.6; 5 TABLET ORAL at 08:46

## 2023-05-28 RX ADMIN — METHOCARBAMOL 1000 MG: 500 TABLET ORAL at 12:22

## 2023-05-28 RX ADMIN — LEVETIRACETAM 500 MG: 500 TABLET, FILM COATED ORAL at 20:39

## 2023-05-28 RX ADMIN — ACETAMINOPHEN 975 MG: 325 TABLET ORAL at 08:38

## 2023-05-28 RX ADMIN — DEXAMETHASONE SODIUM PHOSPHATE 4 MG: 4 INJECTION, SOLUTION INTRAMUSCULAR; INTRAVENOUS at 00:32

## 2023-05-28 RX ADMIN — ACETAMINOPHEN 975 MG: 325 TABLET ORAL at 15:28

## 2023-05-28 RX ADMIN — POLYETHYLENE GLYCOL 3350 17 G: 17 POWDER, FOR SOLUTION ORAL at 08:38

## 2023-05-28 RX ADMIN — ACETAMINOPHEN 975 MG: 325 TABLET ORAL at 00:32

## 2023-05-28 RX ADMIN — SENNOSIDES AND DOCUSATE SODIUM 2 TABLET: 50; 8.6 TABLET ORAL at 20:39

## 2023-05-28 ASSESSMENT — ACTIVITIES OF DAILY LIVING (ADL)
ADLS_ACUITY_SCORE: 22

## 2023-05-28 NOTE — PROGRESS NOTES
05/28/23 1500   Signing Clinician's Name / Credentials   Signing clinician's name / credentials Purvi Galan, PT, DPT   Dynamic Gait Index (Ahmet and Wiggins [place], 1995)   Gait Level Surface 2   Change in Gait Speed 1   Gait and Horizontal Head Turns 2   Gait with Vertical Head Turns 2   Gait and Pivot Turns 1   Step Over Obstacle 0   Step Around Obstacles 1   Steps 2   Total Dynamic Gait Index Score  (A score of 19 or less has been correlated to an increased risk of falls in community dwelling older adults, patients with vestibular disorders, and patients with MS.)   Total Score (out of 24) 11           Dynamic Gait Index (DGI):The DGI is a measure of balance during gait that is reliable and valid for the elderly and individuals with Parkinson's disease, MS, vestibular disorders, or s/p stroke. Gait assistive device used: None    Scores ?19 /24 indicate an increased risk for falls according to Aidee et al 2000  Minimal Detectable Change = 2.9 in community dwelling elderly according to Ángel et al 2011    Assessment (rationale for performing, application to patient s function & care plan): Patient is a high falls risk, recommend use of FWW for ambulation for safety to decreased risk of falls.   (Minutes billed as physical performance test)

## 2023-05-28 NOTE — PLAN OF CARE
Pt here POD 2 L crani tumor resection    A/Ox4. Intact ex for some RLE numbness. Headache at times, improved with tylenol and robaxin. VSS RA. SBP <160. Tele SB. Continent. A1/GB/W. Regular diet, thin liquids. Pills whole. Scheduled decadron and tylenol. Robaxin PRN. KATELYN drain in place. Incision WDL. Plan for therapies. Discharge pending.

## 2023-05-28 NOTE — PROGRESS NOTES
SLP consult received, and SLP visited with the patient/family today. Pt's speech was clear and fluent throughout this visit, and family reports it is greatly improved. They report occasional difficulty remembering something or finding a word, but that this is rare and is not impacting her functional communication. SLP offered speech-language evaluation, but the patient doesn't feel it is indicated right now (family agreed). The patient is willing to pursue SLP for high level cognitive linguistic intervention if needed at her next level of care, whether that be ARU or home with OP.

## 2023-05-28 NOTE — PROGRESS NOTES
[facility]    Medicine Progress Note - Hospitalist Service    Date of Admission:  5/23/2023    Assessment & Plan   Sarah Frost is a 63 year old female admitted on 5/23/2023 with intermittent right sided weakness. CT imaging found left parietal lobe mass with moderate vasogenic edema     Left Parietal mass with moderate vasogenic edema with mass effect with 5mm right miller midline shif  S/p craniotomy, resection of brain tumor on 5/26  Intermittent R sided weakness suspected focal seizures related to mass  * CT head: cortically based left parietal lobe tumor with moderate associated vasogenic edema. Mass effect includes 5mm rightward midline shift, partial effacement of basilar cisterns without transtentorial herniation, partial effacement of the third and left lateral ventricles.  * CTA head/neck: no prox vessel occlusion or significant stenosis  * MRI brain-large large heterogeneously enhancing solid and cystic or necrotic lesion arising from the posterior superior aspect of the left frontal lobe, favored to be intra-axial. Primary differential considerations include high-grade primary brain neoplasm or metastasis. Moderate associated vasogenic edema.2.  4 mm ring-enhancing lesion is seen along the ventral base of the infundibulum, nonspecific. This could reflect an additional metastasis. Small plaque-like area of dural-based enhancement seen along the posterior aspect of the falx, nonspecific but  compatible with a meningioma (versus dural metastasis). 3.  2 mm nonenhancing focus posterior aspect of the pituitary likely reflecting a tiny cyst or cystically degenerated adenoma. Suggest further assessment with dedicated MRI of the sella.  * received decadron 10mg in ED  * EEG- abnormal due to the presence of focal delta slowing over the left posterior temporoparietal head region, consistent with structural abnormality there.  No electrographic seizures or epileptiform discharges were  recorded.  * CT chest/abd/pelvis-no occult malignancy noted  * CT Head repeated on 5/25 due to sinus bradycardia--unchanged when compared with previous imaging from 5/23  * Right sided weakness improved since admission and has not had recurrent episode  * s/p craniotomy with tumor resection as above by Dr. Monroy on 5/26  * Post op MRI reviewed with neurosurgery. Discussed with Kat Ayala PA-C on 5/27 regarding MRI finding--> Rim-enhancing lesion within the left paramedian sella/adenohypophysis series 15 image 94-96 could represent a benign versus metastatic lesion of the adenohypophysis as before but remains indeterminate with some displacement of the pituitary stalk.  Dedicated MRI with thin section/dynamic imaging on follow-up could be helpful to assess. This is also noted on admission MRI  -She will talk to Dr. Monroy and will discuss plans/recommendations.     - routine post op care per neurosurgery drain in place  - SBP <160  - continue with decadron taper per neurosurgery  - keppra 500mg bid  - q2hr neuro checks during the day and q4 check at night--transferring to the floor  - HOB 30 deg  - PT/OT  - subcutaneous heparin for prophylaxis ok to start on Monday 5/29 per neurosurgery  - PT/OT eval  - care transition team consult for discharge planning  - labs reviewed this morning, stable    Sinus bradycardia- improved  Patient is asymptomatic from this and no new neuro changes at this time. However, HR were mostly 50s-60s since admission and now 40-50s on 5/25.    * CT head on 5/25 did not show changes from prior finding.   * discussed with Dr. Gilbert cardiology, will notify cards if symptomatic/pauses and/or sustaining in the 30s   * on POD#1 5/27 and HR remains in 40s-50s  * on 5/28- POD#2. HR has improved and tele HR 60-70s.  Suspect sinus bradycardia is related to above intracranial process   - continue telemetry today and if HR remains stable, could consider discontinuing in the next 24-48hrs  - she is  "asymptomatic from this     Hx VSD and PVCs  Moderate aortic stenosis  Cardiology consulted for preop evaluation by neurosurgery. Echo completed showing LVEF of 60-65%, small L to R shunt, mild to moderate aortic stenosis.   * cardiology eval completed, no futher cardiac work up need prior to surgery. Recommend OP f/u in adult congenital clinic.     Constipation  - increase scheduled Senna-colace to 2tabs bid and Miralax daily  - suppository prn      Overweight: Estimated body mass index is 28.73 kg/m  as calculated from the following:    Height as of this encounter: 1.803 m (5' 11\").    Weight as of this encounter: 93.4 kg (206 lb).present on admission  - f/u as out pt.       Diet: Regular Diet Adult    DVT Prophylaxis: Pneumatic Compression Devices  Lea Catheter: Not present  Lines: None     Cardiac Monitoring: ACTIVE order. Indication: Bradycardias (48 hours)  Code Status: Full Code      Clinically Significant Risk Factors                         # Overweight: Estimated body mass index is 28.73 kg/m  as calculated from the following:    Height as of this encounter: 1.803 m (5' 11\").    Weight as of this encounter: 93.4 kg (206 lb).           Disposition Plan  pending post op progress and per neurosurgery, likely 2-3 days     Expected Discharge Date: 05/29/2023                  Siva Mi MD  Hospitalist Service  [facility]  Securely message with [employer] (more info)  Text page via [facility] Paging/Directory   ______________________________________________________________________    Interval History   Patient denies headache, nausea, vomiting or abdominal pain.   No BM yet, she is passing flatus.   Tele reviewed HR has improved to 60s-70s     Physical Exam   Vital Signs: Temp: 98.7  F (37.1  C) Temp src: Oral BP: 114/77    Resp: 13 SpO2: 94 % O2 Device: None (Room air) Oxygen Delivery: 1.5 LPM  Weight: 206 lbs 0 oz    General Appearance: Alert, awake and no apparent " distress  Respiratory: CTAB, no wheezing  Cardiovascular: regular rate and rhythm  GI: soft and non-tender  Skin: warm and dry      Medical Decision Making       40 MINUTES SPENT BY ME on the date of service doing chart review, history, exam, documentation & further activities per the note.  MANAGEMENT DISCUSSED with the following over the past 24 hours: patient and RN   NOTE(S)/MEDICAL RECORDS REVIEWED over the past 24 hours: neurosurgery  Tests ORDERED & REVIEWED in the past 24 hours:  - BMP  - CBC      Data     I have personally reviewed the following data over the past 24 hrs:    N/A  \   N/A   / N/A     140 104 16.5 /  109 (H)   4.1 23 0.80 \       Imaging results reviewed over the past 24 hrs:   Recent Results (from the past 24 hour(s))   MR Brain w/o & w Contrast    Narrative    EXAM: MR BRAIN W/O and W CONTRAST  LOCATION: [facility]  DATE/TIME: 5/27/2023 9:51 AM CDT    INDICATION: Postop.  COMPARISON: Preoperative brain MRI 05/23/2023 head CT 05/25/2023 also preoperative.  CONTRAST: 9mL Gadavist  TECHNIQUE: Routine multiplanar multisequence head MRI without and with intravenous contrast.    FINDINGS: Since previous evaluation, interval postoperative changes of left parietal occipital craniotomy according to clinical history performed for resection/treatment of large heterogeneous enhancing left parietal supratentorial mass lesion, primary   surgical pathology as of time of this region reported as favor primary brain tumor type unspecified.    INTRACRANIAL CONTENTS: Since previous MRI/CT, previous identified bulky large enhancing mass appears to have been totally or near totally resected, with cavity of hypointense T1 hyperintense T2 signal fluid, recent blood products and associated marginal   enhancement about the cavity series 10 image 23 series 15 image 151. There is restricted diffusion along the margins of the resection cavity likely representing postoperative recent ischemia  and/or restricted signal due to cavity blood products. This   could be evaluated on future follow-up with no additional recent ischemic changes intracranially elsewhere. Cavity measures about 5 cm ML dimension, 1.8 cm radially. The marginal enhancement on today's evaluation is presumed postoperative although   residual marginal neoplasm cannot be fully excluded. This examination can serve as a baseline for future follow-up studies. There is diminished mass effect upon the adjacent parenchyma status post resection including upon the posterior body and occipital   horn of the left lateral ventricle. Increased caliber specifically of the occipital horn of the left lateral ventricle with diminished left to right shift at the subfalcine level and posterior falx level, now measures about 4 mm. Nonenhancing T2/FLAIR   hypertense signal abnormality about the resection margins of the posterior left frontal lobe and anterior parietal lobe medially extending about the margins of the posterior body and occipital horn left lateral ventricle and involve the left splenium of   the corpus callosum as before likely representing residual peritumoral edema less likely nonenhancing infiltrative neoplasm. The extent of this T2 signal abnormality is stable from the preoperative study. Postoperative extra-axial gas signal overlies the   paramedian left frontal level. A small amount of extra-axial blood products/fluid deep to the craniotomy level measures about 6 mm series 11 image 19. Stable appearance to lobular dural left parafalcine level posteriorly series 13 image 23 may reflect a   small meningioma versus dural metastasis. Dural venous sinus enhancement shows no definite thrombosis with some areas of presumed extrinsic mass effect and/or discontinuity of the superior sagittal sinus enhancement series 14 image 93 at and to the left   of midline. Transverse sinus and sigmoid sinus enhancement is satisfactory. No pathologic enhancement  the skull base level or upper cervical cord. Position of the cerebellar tonsils is satisfactory.    SELLA: Rim-enhancing lesion within the left paramedian sella/adenohypophysis series 15 image 94-96 could represent a benign versus metastatic lesion of the adenohypophysis as before but remains indeterminate with some displacement of the pituitary stalk.   Dedicated MRI with thin section/dynamic imaging on follow-up could be helpful to assess.    OSSEOUS STRUCTURES/SOFT TISSUES: Soft tissue swelling overlies the craniotomy level with no abnormal diploic space or orbital enhancement. The major intracranial vascular flow voids are maintained.     ORBITS: No abnormality accounting for technique. No pathologic orbital enhancement.    SINUSES/MASTOIDS: Mild mucosal thickening scattered about the paranasal sinuses. Scattered fluid/membrane thickening in the mastoid air cells bilaterally.       Impression    IMPRESSION:  1.  Since previous MR/CT evaluations listed above, interval postoperative changes of left parietal occipital craniotomy with resection of previously identified heterogeneous enhancing large left parietal mass. 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    2.  Decreased mass effect status post tumor resection upon the left cerebral hemisphere left lateral ventricle and left corpus callosum with improved caliber of the occipital horn of the left lateral ventricle and diminished shift of midline.    3.  There is some in enhancement along the margins of the resection cavity with associated restricted diffusion presumed postoperative in nature. Residual marginal tumor cannot be fully excluded and today's evaluation can serve as a baseline for future   follow-up studies.    4.  Stable appearance to nonenhancing T2/FLAIR signal abnormality about the resection cavity margin extends about the posterior body and occipital horn of the  left lateral ventricle stable from prior study probably represent peritumoral edema.   Nonenhancing infiltrative neoplasm is considered less likely but could be evaluated on follow-up study as well.    5.  Fluid/blood products, gas density at the left supratentorial level including the resection cavity and overlying the paramedian left frontal lobe anteriorly presumed postoperative. Postoperative fluid deep to the craniotomy site with some dural   enhancement.    6.  Enhancement abnormality redemonstrated dural based left parafalcine level, and associated with the sella unchanged from preoperative study with stable diagnostic considerations and follow-up recommendations as listed above.    7.  There is no additional areas of recent infarction present intracranially beyond the resection cavity level.    8.  Additional details and full description are given above.

## 2023-05-28 NOTE — PROGRESS NOTES
[facility]    Neurosurgery  Daily Note    Assessment & Plan   Procedure(s):  CRANIOTOMY, USING OPTICAL TRACKING SYSTEM, WITH NEOPLASM EXCISION STEALTH ASSISTED LEFT FRONTOPARIETAL CRANIOTOMY WITH RESCETION OF BRAIN TUMOR   2 Days Post-Op  Pain well controlled. Drain intact, 30 output overnight. Neuro exam stable.     EXAM: MR BRAIN W/O and W CONTRAST  LOCATION: [facility]  DATE/TIME: 5/27/2023 9:51 AM CDT                                                      IMPRESSION:  1.  Since previous MR/CT evaluations listed above, interval postoperative changes of left parietal occipital craniotomy with resection of previously identified heterogeneous enhancing large left parietal mass. 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     2.  Decreased mass effect status post tumor resection upon the left cerebral hemisphere left lateral ventricle and left corpus callosum with improved caliber of the occipital horn of the left lateral ventricle and diminished shift of midline.     3.  There is some in enhancement along the margins of the resection cavity with associated restricted diffusion presumed postoperative in nature. Residual marginal tumor cannot be fully excluded and today's evaluation can serve as a baseline for future   follow-up studies.     4.  Stable appearance to nonenhancing T2/FLAIR signal abnormality about the resection cavity margin extends about the posterior body and occipital horn of the left lateral ventricle stable from prior study probably represent peritumoral edema.   Nonenhancing infiltrative neoplasm is considered less likely but could be evaluated on follow-up study as well.     5.  Fluid/blood products, gas density at the left supratentorial level including the resection cavity and overlying the paramedian left frontal lobe anteriorly presumed postoperative.  Postoperative fluid deep to the craniotomy site with some dural   enhancement.     6.  Enhancement abnormality redemonstrated dural based left parafalcine level, and associated with the sella unchanged from preoperative study with stable diagnostic considerations and follow-up recommendations as listed above.     7.  There is no additional areas of recent infarction present intracranially beyond the resection cavity level.     8.  Additional details and full description are given above.        Plan:  -MRI reviewed with Dr. Monroy. Dr. Monroy will review findings with patient in regards to enhancement abnormality dural based left parafalcine level and associated with sella   -OK for q4H neuro checks   -HOB30  -SBP<160  -KATELYN drain to bulb suction, monitor output. Likely remove tomorrow. Please have drain removal supplied and nylon suture with needle  ready in room for removal  -Advance activity as tolerated  -Continue supportive and symptomatic treatment  -Start or continue physical therapy  -Pain control measures  -Decadron -2 week taper to off orders are in  -Continue Keppra 500 BID  -subcutaneous heparin to start Monday.   -PT, OT consulted; appreciate involvement  -Advance diet as tolerated  -Routine wound care    Kat Arita PA-C  [facility]  [address] [address]    Tel [phone]  Pager [phone]    Principal Problem:    Brain mass      Kat Arita PA-C    Interval History   Stable     Physical Exam   Temp: 98.7  F (37.1  C) Temp src: Oral BP: 114/77 Pulse: (!) 48   Resp: 13 SpO2: 94 % O2 Device: None (Room air) Oxygen Delivery: 1.5 LPM  Vitals:    05/25/23 0400 05/26/23 0500 05/27/23 0600   Weight: 200 lb 9.9 oz (91 kg) 207 lb 0.2 oz (93.9 kg) 206 lb (93.4 kg)     Vital Signs with Ranges  Temp:  [98.6  F (37  C)-98.7  F (37.1  C)] 98.7  F (37.1  C)  Pulse:  [45-70] 48  Resp:  [12-29] 13  BP: (103-150)/(62-89)  114/77  SpO2:  [90 %-96 %] 94 %  I/O last 3 completed shifts:  In: 2170 [P.O.:2060; I.V.:110]  Out: 1430 [Urine:1350; Drains:80]    Awake, alert, oriented x 3  II-XII grossly intacat  5/5 motor strength throughout   Negative drift  Negative clonus     Dressing dry, drain intact      Medications        acetaminophen  975 mg Oral Q8H     dexamethasone  4 mg Intravenous Q12H    Followed by     [START ON 5/29/2023] dexamethasone  4 mg Intravenous Q24H    Followed by     [START ON 6/2/2023] dexamethasone  3 mg Intravenous Q24H    Followed by     [START ON 6/6/2023] dexamethasone  2 mg Intravenous Q24H     gadobutrol  10 mL Intravenous Once     levETIRAcetam  500 mg Oral BID     polyethylene glycol  17 g Oral Daily     senna-docusate  2 tablet Oral BID     sodium chloride (PF)  10 mL Intravenous Once     sodium chloride (PF)  3 mL Intracatheter Q8H     sodium chloride (PF)  3 mL Intracatheter Q8H       Plans discussed with Dr. Monroy who was in agreement with plans    Kat ANGULO [facility]  [address]  [address]  [place] [address]    Tel [phone]  Pager [phone]

## 2023-05-28 NOTE — PLAN OF CARE
Reason for Admission: POD 2 L crani - tumor resection    Cognitive/Mentation: A/Ox 4  Neuros/CMS: Intact ex slight decreased sensation to R foot - improving  VS: stable on RA  Tele: SB.  GI: Continent.  : Continent.  Pulmonary: LS clear.  Pain: incision pain, headache. Scheduled tylenol given, prn robaxin given x1.      Drains/Lines: KATELYN Drain. R PIV - SL  Skin: crani incision - dressing C,D, I  Activity: Assist x 1 with GBW.  Diet: regular with thin liquids. Takes pills whole.     Therapies recs: pending  Discharge: pending medical clearance    Aggression Stoplight Tool: green    End of shift summary: pt stable, no changes. Q2H neuros 7a-7p.

## 2023-05-29 ENCOUNTER — APPOINTMENT (OUTPATIENT)
Dept: PHYSICAL THERAPY | Facility: CLINIC | Age: 63
DRG: 025 | End: 2023-05-29
Attending: PHYSICIAN ASSISTANT
Payer: COMMERCIAL

## 2023-05-29 ENCOUNTER — APPOINTMENT (OUTPATIENT)
Dept: OCCUPATIONAL THERAPY | Facility: CLINIC | Age: 63
DRG: 025 | End: 2023-05-29
Attending: PHYSICIAN ASSISTANT
Payer: COMMERCIAL

## 2023-05-29 VITALS
HEART RATE: 61 BPM | OXYGEN SATURATION: 92 % | WEIGHT: 206 LBS | BODY MASS INDEX: 28.84 KG/M2 | DIASTOLIC BLOOD PRESSURE: 70 MMHG | HEIGHT: 71 IN | RESPIRATION RATE: 16 BRPM | TEMPERATURE: 98 F | SYSTOLIC BLOOD PRESSURE: 124 MMHG

## 2023-05-29 PROCEDURE — 99239 HOSP IP/OBS DSCHRG MGMT >30: CPT | Performed by: HOSPITALIST

## 2023-05-29 PROCEDURE — 250N000011 HC RX IP 250 OP 636: Performed by: INTERNAL MEDICINE

## 2023-05-29 PROCEDURE — 97116 GAIT TRAINING THERAPY: CPT | Mod: GP | Performed by: PHYSICAL THERAPIST

## 2023-05-29 PROCEDURE — 97535 SELF CARE MNGMENT TRAINING: CPT | Mod: GO

## 2023-05-29 PROCEDURE — 250N000013 HC RX MED GY IP 250 OP 250 PS 637: Performed by: INTERNAL MEDICINE

## 2023-05-29 PROCEDURE — 97530 THERAPEUTIC ACTIVITIES: CPT | Mod: GP | Performed by: PHYSICAL THERAPIST

## 2023-05-29 PROCEDURE — 250N000013 HC RX MED GY IP 250 OP 250 PS 637: Performed by: HOSPITALIST

## 2023-05-29 RX ORDER — AMOXICILLIN 250 MG
1 CAPSULE ORAL 2 TIMES DAILY PRN
Qty: 30 TABLET | Refills: 0 | Status: SHIPPED | OUTPATIENT
Start: 2023-05-29 | End: 2023-11-09

## 2023-05-29 RX ORDER — ACETAMINOPHEN 325 MG/1
650 TABLET ORAL EVERY 4 HOURS PRN
Qty: 100 TABLET | Refills: 0 | Status: SHIPPED | OUTPATIENT
Start: 2023-05-29 | End: 2023-11-09

## 2023-05-29 RX ORDER — OXYCODONE HYDROCHLORIDE 5 MG/1
5 TABLET ORAL EVERY 4 HOURS PRN
Qty: 40 TABLET | Refills: 0 | Status: SHIPPED | OUTPATIENT
Start: 2023-05-29 | End: 2023-11-09

## 2023-05-29 RX ORDER — LEVETIRACETAM 500 MG/1
500 TABLET ORAL 2 TIMES DAILY
Qty: 60 TABLET | Refills: 0 | Status: SHIPPED | OUTPATIENT
Start: 2023-05-29 | End: 2023-11-09

## 2023-05-29 RX ORDER — DEXAMETHASONE 1 MG
TABLET ORAL
Qty: 32 TABLET | Refills: 0 | Status: SHIPPED | OUTPATIENT
Start: 2023-05-29 | End: 2023-11-09

## 2023-05-29 RX ORDER — BISACODYL 10 MG
10 SUPPOSITORY, RECTAL RECTAL DAILY PRN
Status: DISCONTINUED | OUTPATIENT
Start: 2023-05-29 | End: 2023-05-29 | Stop reason: HOSPADM

## 2023-05-29 RX ADMIN — LEVETIRACETAM 500 MG: 500 TABLET, FILM COATED ORAL at 08:08

## 2023-05-29 RX ADMIN — DEXAMETHASONE SODIUM PHOSPHATE 4 MG: 4 INJECTION, SOLUTION INTRAMUSCULAR; INTRAVENOUS at 00:56

## 2023-05-29 RX ADMIN — ACETAMINOPHEN 975 MG: 325 TABLET ORAL at 08:08

## 2023-05-29 RX ADMIN — DEXAMETHASONE SODIUM PHOSPHATE 4 MG: 4 INJECTION, SOLUTION INTRAMUSCULAR; INTRAVENOUS at 12:24

## 2023-05-29 RX ADMIN — ACETAMINOPHEN 975 MG: 325 TABLET ORAL at 00:56

## 2023-05-29 RX ADMIN — BISACODYL 10 MG: 10 SUPPOSITORY RECTAL at 14:48

## 2023-05-29 RX ADMIN — POLYETHYLENE GLYCOL 3350 17 G: 17 POWDER, FOR SOLUTION ORAL at 08:08

## 2023-05-29 RX ADMIN — SENNOSIDES AND DOCUSATE SODIUM 2 TABLET: 50; 8.6 TABLET ORAL at 08:08

## 2023-05-29 ASSESSMENT — ACTIVITIES OF DAILY LIVING (ADL)
ADLS_ACUITY_SCORE: 22

## 2023-05-29 NOTE — PLAN OF CARE
Physical Therapy Discharge Summary    Reason for therapy discharge:    Discharged to home with outpatient therapy.    Progress towards therapy goal(s). See goals on Care Plan in [facility] electronic health record for goal details.  Goals not met.  Barriers to achieving goals:   discharge from facility.    Therapy recommendation(s):    Continued therapy is recommended.  Rationale/Recommendations:  Pt will benefit from continued skilled PT intervention with OP PT in order to progress strength and higher level balance. Recommend pt utilize FWW for mobility upon discharge with assist of spouse.

## 2023-05-29 NOTE — DISCHARGE SUMMARY
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    Weight as of this encounter: 93.4 kg (206 lb).       Follow-ups Needed After Discharge   Follow-up Appointments     Follow-up and recommended labs and tests       Your Neurosurgical follow up appointments have been scheduled. You may   call [phone] to make, confirm or change your follow-up Neurosurgery   appointment dates and/or times.             Unresulted Labs Ordered in the Past 30 Days of this Admission     Date and Time Order Name Status Description    5/26/2023 10:53 AM Surgical Pathology Exam Preliminary     5/25/2023 11:13 AM Prepare red blood cells (unit) Preliminary     5/25/2023 11:13 AM Prepare red blood cells (unit) Preliminary       These results will be followed up by neurosurgery    Discharge Disposition   Discharged to home  Condition at discharge: Stable    Hospital Course   Sarah Frost is a 63 year old female admitted on 5/23/2023 with intermittent right sided weakness. CT imaging found left parietal lobe mass with moderate vasogenic edema. 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 Mass effect includes 5mm rightward midline shift, partial effacement of basilar cisterns without transtentorial herniation, partial effacement of the third and left lateral ventricles.  * CTA head/neck: no prox vessel occlusion or significant stenosis  * MRI brain-large large heterogeneously enhancing solid and cystic or necrotic lesion arising from the posterior superior aspect of the left frontal lobe, favored to be intra-axial. Primary differential considerations include high-grade primary brain neoplasm or metastasis. Moderate associated vasogenic edema.2.  4 mm ring-enhancing lesion is seen along the ventral base of the infundibulum, nonspecific. This could reflect an additional metastasis. Small plaque-like area of dural-based enhancement seen along the posterior aspect of the falx, nonspecific but  compatible with a meningioma (versus dural metastasis). 3.  2 mm nonenhancing focus posterior aspect of the pituitary likely reflecting a tiny cyst or cystically degenerated adenoma. Suggest further assessment with dedicated MRI of the sella.  * received decadron 10mg in ED  * EEG- abnormal due to the presence of focal delta slowing over the left posterior temporoparietal head region, consistent with structural abnormality there.  No electrographic seizures or epileptiform discharges were recorded.  * CT chest/abd/pelvis-no occult malignancy noted  * CT Head repeated on 5/25 due to sinus bradycardia--unchanged when compared with previous imaging from 5/23  * Right sided weakness improved since admission and has not had recurrent episode  * s/p craniotomy with tumor resection as above by Dr. Monroy on 5/26  * Post op MRI reviewed with neurosurgery. 5/27 MRI finding--> Rim-enhancing lesion within the left paramedian  sella/adenohypophysis series 15 image [id number] could represent a benign versus metastatic lesion of the adenohypophysis as before but remains indeterminate with some displacement of the pituitary stalk.  Dedicated MRI with thin section/dynamic imaging on follow-up could be helpful to assess. This is also noted on admission MRI  - routine post op care per neurosurgery, wound cares included on discharge  - continue with decadron taper per neurosurgery  - keppra 500mg bid  - outpatient PT/OT on discharge  - follow up with neurosurgery clinic as scheduled.     Sinus bradycardia- improved  Patient is asymptomatic from this and no new neuro changes at this time. However, HR were mostly 50s-60s since admission and now 40-50s on 5/25.    * CT head on 5/25 did not show changes from prior finding.   * discussed with Dr. Gilbert cardiology, will notify cards if symptomatic/pauses and/or sustaining in the 30s   * on POD#1 5/27 and HR remains in 40s-50s  * on 5/28- POD#2. HR has improved and tele HR 60-70s.  Suspect sinus bradycardia is related to above intracranial process      Hx VSD and PVCs  Moderate aortic stenosis  Cardiology consulted for preop evaluation by neurosurgery. Echo completed showing LVEF of 60-65%, small L to R shunt, mild to moderate aortic stenosis.   * cardiology eval completed, no futher cardiac work up need prior to surgery. Recommend OP f/u in adult congenital clinic.      Constipation  - PRN senna-docusate on discharge    Consultations This Hospital Stay   NEUROLOGY IP CONSULT  NEUROSURGERY IP CONSULT  NEUROLOGY CRITICAL CARE ADULT IP CONSULT  CARDIOLOGY IP CONSULT  PHYSICAL THERAPY ADULT IP CONSULT  OCCUPATIONAL THERAPY ADULT IP CONSULT  SPEECH LANGUAGE PATH ADULT IP CONSULT  CARE MANAGEMENT / SOCIAL WORK IP CONSULT    Code Status   Full Code    Time Spent on this Encounter   Mayte HATCH DO, personally saw the patient today and spent greater than 30 minutes discharging this patient.       Mayte  DO ADELE Smith [facility] NEUROSCIENCE UNIT  [address] [place] [address]  Phone: [phone]  ______________________________________________________________________    Physical Exam   Vital Signs: Temp: 98.2  F (36.8  C) Temp src: Oral BP: (!) 142/90 Pulse: (!) 48   Resp: 16 SpO2: 97 % O2 Device: None (Room air)    Weight: 206 lbs 0 oz    Patient seen and examined. She is doing well. Has some numbness in her right leg/foot that limits her ability to walk. She is cautious when walking. Her drain was removed today and she toelrated it well. Does not have much pain. Did have some issues with constipation and received suppository prior to discharge. Her  will work from home this week and can provide support to her. She is stable and ready for discharge to home with outpatient therapies.     Constitutional: Awake, alert, cooperative, no apparent distress  Respiratory: Clear to auscultation bilaterally, no crackles or wheezing  Cardiovascular: Regular rate and rhythm, normal S1 and S2, and no murmur noted  GI: Normal bowel sounds, soft, non-distended, non-tender  Skin/Integumen: No rashes, no cyanosis, no edema  Other: Incision clean, sutures intact, no drainage or erythema visible (after drain removed). Strength equal in upper and lower extremities. Follows commands.       Primary Care Physician   Physician No Ref-Primary    Discharge Orders      Physical Therapy Referral      Occupational Therapy Referral      Follow-up and recommended labs and tests     Your Neurosurgical follow up appointments have been scheduled. You may call [phone] to make, confirm or change your follow-up Neurosurgery appointment dates and/or times.     Activity    Your activity upon discharge: Limit heavy lifting until follow up visit. Ok to walk as tolerated. No high impact activities until follow-up visit.     Wound care and dressings    Instructions to care for your wound at home: Keep your incision clean  and dry. Okay to shower on post-op day 3. Okay for water to run over incision and gently pat dry. Do not scrub incision. No bathing, swimming, or submerging incision under water until follow-up visit. Call clinic or go to the ER with any incision drainage or swelling. Follow-up in clinic at 2 weeks post op for incision check.     Reason for your hospital stay    Brain mass requiring surgical removal     Discharge Instructions    1. To help reduce inflammation from your surgery and the mass that was removed, you will take a tapering dose of decadron daily. Take 4mg daily 5/30-6/1, then 3mg from 6/2-6/5, then 2mg 6/6-6/9    2.Take keppra twice daily, this helps to reduce your risk of seizure as the inflammation goes down    3. Take tylenol as needed for pain. Take oxycodone as needed for severe pain    4. Take senna-docusate twice daily as needed for constipation     Levon HATCH, the undersigned, certify that the above prescribed supplies are medically necessary for this patient and is both reasonable and necessary in reference to accepted standards of medical and necessary in reference to accepted standards of medical practice in the treatment of this patient's condition and is not prescribed as a convenience.      Diet    Follow this diet upon discharge: Regular Diet Adult       Significant Results and Procedures   Most Recent 3 CBC's:Recent Labs   Lab Test 05/28/23  0824 05/26/23  1444 05/26/23  0508   WBC 8.0 10.4 7.8   HGB 12.9 12.8 13.6   MCV 99 95 96    186 215     Most Recent 3 BMP's:Recent Labs   Lab Test 05/28/23  0824 05/28/23  0635 05/27/23  1021 05/26/23  1624 05/26/23  1444     --  140  --  139   POTASSIUM 4.6  --  4.1  --  4.2   CHLORIDE 103  --  104  --  105   CO2 29  --  23  --  24   BUN 17.4  --  16.5  --  15.1   CR 0.95  --  0.80  --  0.94   ANIONGAP 8  --  13  --  10   QUAN 9.9  --  9.6  --  9.4   GLC 94 109* 123*   < > 156*    < > = values in this interval not displayed.   ,   Results  for orders placed or performed during the hospital encounter of 05/23/23   CT Head w/o Contrast    Narrative    EXAM: CT HEAD W/O CONTRAST, CTA HEAD NECK W CONTRAST  LOCATION: [facility]  DATE/TIME: 5/23/2023 6:20 PM CDT    INDICATION: Right-sided weakness. Visual changes Code Stroke to evaluate for potential thrombolysis and thrombectomy. PLEASE READ IMMEDIATELY.  COMPARISON: None.  CONTRAST: 75 mL isovue 370  TECHNIQUE: Head and neck CT angiogram with IV contrast. Noncontrast head CT followed by axial helical CT images of the head and neck vessels obtained during the arterial phase of intravenous contrast administration. Axial 2D reconstructed images and   multiplanar 3D MIP reconstructed images of the head and neck vessels were performed by the technologist. Dose reduction techniques were used. All stenosis measurements made according to NASCET criteria unless otherwise specified.    FINDINGS:   NONCONTRAST HEAD CT:   INTRACRANIAL CONTENTS: Large cortically based hypoattenuating mass within the left parietal lobe measures 52 mm AP x 47 mm TV x 42 mm CC. Moderate anterior adjacent vasogenic edema which extends into the corpus callosum splenium. Mass effect includes   local sulcal effacement. Partial effacement of the left lateral oral and third ventricle. Rightward midline shift measures 5 mm. Partial effacement of the basilar cisterns. No downward transtentorial herniation. No intracranial hemorrhage or abnormal   extraaxial collection.  No CT evidence of acute infarct. No additional abnormal brain parenchymal attenuation.      VISUALIZED ORBITS/SINUSES/MASTOIDS: No intraorbital abnormality. No paranasal sinus mucosal disease. No middle ear or mastoid effusion.    BONES/SOFT TISSUES: No acute abnormality.    HEAD CTA:  ANTERIOR CIRCULATION: No stenosis or occlusion. No aneurysm or high flow vascular malformation. Hypertrophied branch of the left anterior cerebral artery supplies the very  vascular left parietal lobe tumor. Standard [place] of [place] anatomy.    POSTERIOR CIRCULATION: No stenosis/occlusion, aneurysm, or high flow vascular malformation. Balanced vertebral arteries supply a normal basilar artery.     DURAL VENOUS SINUSES: Expected enhancement of the major dural venous sinuses.    NECK CTA:  RIGHT CAROTID: No measurable stenosis or dissection.    LEFT CAROTID: No measurable stenosis or dissection.    VERTEBRAL ARTERIES: No focal stenosis or dissection. Balanced vertebral arteries.    AORTIC ARCH: Classic aortic arch anatomy with no significant stenosis at the origin of the great vessels.    NONVASCULAR STRUCTURES: Unremarkable.      Impression    IMPRESSION:   HEAD CT:  1.  Cortically-based left parietal lobe tumor with moderate associated vasogenic edema. Recommend further characterization with MRI.  2.  Resultant mass effect includes 5 mm rightward midline shift, partial effacement of the basilar cisterns without transtentorial herniation, partial effacement of the third and left lateral ventricles, and    HEAD CTA:   1.  No large vessel occlusion or stenosis.  2.  Left anterior cerebral artery supplies the hypervascular left parietal lobe tumor.    NECK CTA:  1.  Normal neck CTA.    Findings discussed with Dr. Josue pineal 624 and 6:31 PM on 05/23/2023   CTA Head Neck with Contrast    Narrative    EXAM: CT HEAD W/O CONTRAST, CTA HEAD NECK W CONTRAST  LOCATION: [facility]  DATE/TIME: 5/23/2023 6:20 PM CDT    INDICATION: Right-sided weakness. Visual changes Code Stroke to evaluate for potential thrombolysis and thrombectomy. PLEASE READ IMMEDIATELY.  COMPARISON: None.  CONTRAST: 75 mL isovue 370  TECHNIQUE: Head and neck CT angiogram with IV contrast. Noncontrast head CT followed by axial helical CT images of the head and neck vessels obtained during the arterial phase of intravenous contrast administration. Axial 2D reconstructed images and   multiplanar  3D MIP reconstructed images of the head and neck vessels were performed by the technologist. Dose reduction techniques were used. All stenosis measurements made according to NASCET criteria unless otherwise specified.    FINDINGS:   NONCONTRAST HEAD CT:   INTRACRANIAL CONTENTS: Large cortically based hypoattenuating mass within the left parietal lobe measures 52 mm AP x 47 mm TV x 42 mm CC. Moderate anterior adjacent vasogenic edema which extends into the corpus callosum splenium. Mass effect includes   local sulcal effacement. Partial effacement of the left lateral oral and third ventricle. Rightward midline shift measures 5 mm. Partial effacement of the basilar cisterns. No downward transtentorial herniation. No intracranial hemorrhage or abnormal   extraaxial collection.  No CT evidence of acute infarct. No additional abnormal brain parenchymal attenuation.      VISUALIZED ORBITS/SINUSES/MASTOIDS: No intraorbital abnormality. No paranasal sinus mucosal disease. No middle ear or mastoid effusion.    BONES/SOFT TISSUES: No acute abnormality.    HEAD CTA:  ANTERIOR CIRCULATION: No stenosis or occlusion. No aneurysm or high flow vascular malformation. Hypertrophied branch of the left anterior cerebral artery supplies the very vascular left parietal lobe tumor. Standard [place] of Mcelroy anatomy.    POSTERIOR CIRCULATION: No stenosis/occlusion, aneurysm, or high flow vascular malformation. Balanced vertebral arteries supply a normal basilar artery.     DURAL VENOUS SINUSES: Expected enhancement of the major dural venous sinuses.    NECK CTA:  RIGHT CAROTID: No measurable stenosis or dissection.    LEFT CAROTID: No measurable stenosis or dissection.    VERTEBRAL ARTERIES: No focal stenosis or dissection. Balanced vertebral arteries.    AORTIC ARCH: Classic aortic arch anatomy with no significant stenosis at the origin of the great vessels.    NONVASCULAR STRUCTURES: Unremarkable.      Impression    IMPRESSION:   HEAD  CT:  1.  Cortically-based left parietal lobe tumor with moderate associated vasogenic edema. Recommend further characterization with MRI.  2.  Resultant mass effect includes 5 mm rightward midline shift, partial effacement of the basilar cisterns without transtentorial herniation, partial effacement of the third and left lateral ventricles, and    HEAD CTA:   1.  No large vessel occlusion or stenosis.  2.  Left anterior cerebral artery supplies the hypervascular left parietal lobe tumor.    NECK CTA:  1.  Normal neck CTA.    Findings discussed with Dr. Josue pineal 624 and 6:31 PM on 05/23/2023   MR Brain w/o & w Contrast    Narrative    EXAM: MR BRAIN W/O and W CONTRAST, MR BRAIN W CONTRAST STEREOTACTIC  LOCATION: [facility]  DATE/TIME: 5/23/2023 8:55 PM CDT    INDICATION: New left-sided brain mass on CT.  COMPARISON: CT brain 5/23/2023. MRI brain 5/23/2023.  CONTRAST: 15 mL Gadavist  TECHNIQUE: Multiplanar multisequence head MRI without and with intravenous contrast with dedicated surgical/therapy planning sequences.    FINDINGS:  INTRACRANIAL CONTENTS: There is a large heterogeneous enhancing left parietal mass measuring approximately 6.4 x 4.3 x 4.5 cm in maximal dimensions. The lesion is favored to be cortically based intra-axial and demonstrates areas of heterogeneous   enhancement and areas of internal T2 prolongation and enhancement compatible with cystic change or central necrosis. Enhancement pattern, however, suggested this may be extra-axial and dural-based with areas of thicker enhancement seen along the parietal   inner table radiating centrally from the inner table. Several prominent flow voids are seen along the surface of the lesion. Moderate FLAIR hyperintensity compatible with vasogenic edema is seen within the more ventral left parietal and posterior left   frontal lobes. There is a second plaque-like area of abnormal dural enhancement arising from the posterior  falx on the left, nonspecific but compatible with a small meningioma (axial image 21 of series 13, sagittal image 98 of series 14).    Left-sided mass effect with approximately 6 mm left to right midline shift measured at the mid septum pellucidum. Effacement of the atrium of left lateral ventricle. No dilatation of the left temporal horn. Lateral and third ventricles otherwise normal   in size. Cerebellar tonsils are normally positioned. Visualized upper cervical spinal cord and corpus callosum are unremarkable. Major skull base vascular flow-voids are preserved.    Although not well visualized on the current exam, note is made of a 4 mm rim-enhancing lesion along the caudal ventral margin of the infundibulum/superior margin of the pituitary gland (sagittal postcontrast image 98). In addition, within the posterior   aspect of the pituitary is a small 2 mm nonenhancing focus. Correlation with dedicated MRI of the sella is suggested for further assessment.    OSSEOUS STRUCTURES/SOFT TISSUES: Visualized marrow space is unremarkable. Specifically, no abnormal signal is seen within the right parietal calvarium.    ORBITS: No abnormality accounting for technique.     SINUSES/MASTOIDS: Mild bilateral maxillary sinus mucosal thickening. Minimal fluid or mucosal thickening is seen within both mastoid sinuses.      Impression    IMPRESSION:  1.  There is a large heterogeneously enhancing solid and cystic or necrotic lesion arising from the posterior superior aspect of the left frontal lobe, favored to be intra-axial. Primary differential considerations include high-grade primary brain   neoplasm or metastasis. Moderate associated vasogenic edema.    2.  4 mm ring-enhancing lesion is seen along the ventral base of the infundibulum, nonspecific. This could reflect an additional metastasis. Small plaque-like area of dural-based enhancement seen along the posterior aspect of the falx, nonspecific but   compatible with a  meningioma (versus dural metastasis).    3.  2 mm nonenhancing focus posterior aspect of the pituitary likely reflecting a tiny cyst or cystically degenerated adenoma. Suggest further assessment with dedicated MRI of the sella.   MR Brain w Contrast Stereotactic    Narrative    EXAM: MR BRAIN W/O and W CONTRAST, MR BRAIN W CONTRAST STEREOTACTIC  LOCATION: [facility]  DATE/TIME: 5/23/2023 8:55 PM CDT    INDICATION: New left-sided brain mass on CT.  COMPARISON: CT brain 5/23/2023. MRI brain 5/23/2023.  CONTRAST: 15 mL Gadavist  TECHNIQUE: Multiplanar multisequence head MRI without and with intravenous contrast with dedicated surgical/therapy planning sequences.    FINDINGS:  INTRACRANIAL CONTENTS: There is a large heterogeneous enhancing left parietal mass measuring approximately 6.4 x 4.3 x 4.5 cm in maximal dimensions. The lesion is favored to be cortically based intra-axial and demonstrates areas of heterogeneous   enhancement and areas of internal T2 prolongation and enhancement compatible with cystic change or central necrosis. Enhancement pattern, however, suggested this may be extra-axial and dural-based with areas of thicker enhancement seen along the parietal   inner table radiating centrally from the inner table. Several prominent flow voids are seen along the surface of the lesion. Moderate FLAIR hyperintensity compatible with vasogenic edema is seen within the more ventral left parietal and posterior left   frontal lobes. There is a second plaque-like area of abnormal dural enhancement arising from the posterior falx on the left, nonspecific but compatible with a small meningioma (axial image 21 of series 13, sagittal image 98 of series 14).    Left-sided mass effect with approximately 6 mm left to right midline shift measured at the mid septum pellucidum. Effacement of the atrium of left lateral ventricle. No dilatation of the left temporal horn. Lateral and third ventricles  otherwise normal   in size. Cerebellar tonsils are normally positioned. Visualized upper cervical spinal cord and corpus callosum are unremarkable. Major skull base vascular flow-voids are preserved.    Although not well visualized on the current exam, note is made of a 4 mm rim-enhancing lesion along the caudal ventral margin of the infundibulum/superior margin of the pituitary gland (sagittal postcontrast image 98). In addition, within the posterior   aspect of the pituitary is a small 2 mm nonenhancing focus. Correlation with dedicated MRI of the sella is suggested for further assessment.    OSSEOUS STRUCTURES/SOFT TISSUES: Visualized marrow space is unremarkable. Specifically, no abnormal signal is seen within the right parietal calvarium.    ORBITS: No abnormality accounting for technique.     SINUSES/MASTOIDS: Mild bilateral maxillary sinus mucosal thickening. Minimal fluid or mucosal thickening is seen within both mastoid sinuses.      Impression    IMPRESSION:  1.  There is a large heterogeneously enhancing solid and cystic or necrotic lesion arising from the posterior superior aspect of the left frontal lobe, favored to be intra-axial. Primary differential considerations include high-grade primary brain   neoplasm or metastasis. Moderate associated vasogenic edema.    2.  4 mm ring-enhancing lesion is seen along the ventral base of the infundibulum, nonspecific. This could reflect an additional metastasis. Small plaque-like area of dural-based enhancement seen along the posterior aspect of the falx, nonspecific but   compatible with a meningioma (versus dural metastasis).    3.  2 mm nonenhancing focus posterior aspect of the pituitary likely reflecting a tiny cyst or cystically degenerated adenoma. Suggest further assessment with dedicated MRI of the sella.   CT Chest/Abdomen/Pelvis w Contrast    Narrative    CT CHEST/ABDOMEN/PELVIS WITH CONTRAST 5/24/2023 9:38 AM    CLINICAL HISTORY: Brain tumor, evaluate  for primary.    TECHNIQUE: CT scan of the chest, abdomen, and pelvis was performed  following injection of IV contrast. Multiplanar reformats were  obtained. Dose reduction techniques were used.   CONTRAST: 99mL Isovue-370    COMPARISON: None.    FINDINGS:   LUNGS AND PLEURA: Mild peripheral atelectasis and/or fibrosis. No  pulmonary masses or effusions.    MEDIASTINUM/AXILLAE: No adenopathy or aneurysm.    CORONARY ARTERY CALCIFICATIONS: None.    HEPATOBILIARY: No significant mass or bile duct dilatation. No  calcified gallstones.     PANCREAS: No significant mass, duct dilatation, or inflammatory  change.    SPLEEN: Normal size.    ADRENAL GLANDS: No significant nodules.    KIDNEYS/BLADDER: No significant mass, stones, or hydronephrosis.    BOWEL: No obstruction or inflammatory change.    PELVIC ORGANS: No pelvic masses.    ADDITIONAL FINDINGS: No adenopathy or ascites.    MUSCULOSKELETAL: No frankly destructive bony lesions.      Impression    IMPRESSION: No occult malignancy demonstrated.     ANTONIO ROLLE MD         SYSTEM ID:  [id number]   CT Head w/o Contrast    Narrative    CT SCAN OF THE HEAD WITHOUT CONTRAST May 25, 2023 12:57 PM     HISTORY: Intracranial mass with vasogenic edema, worsening sinus  bradycardia, concern for possible increased intracranial pressure.    TECHNIQUE: Axial images of the head and coronal reformations without  IV contrast material. Radiation dose for this scan was reduced using  automated exposure control, adjustment of the mA and/or kV according  to patient size, or iterative reconstruction technique.    COMPARISON: MRI brain 5/23/2023. CT head 5/23/2023.    FINDINGS: Again seen is a large predominantly hypoattenuating but  somewhat heterogeneous mass in the left parietal region measuring up  to approximately 6.5 cm in the axial plane (series 2 image 21), not  significantly changed from most recent studies. Moderate local mass  effect with asymmetric left frontoparietal vertex  sulcal effacement.  There is grossly unchanged mild vasogenic edema in the underlying  brain parenchyma extending from the subcortical white matter of the  left frontoparietal vertex superiorly down to the periventricular  white matter adjacent to the body/atrium of the left lateral  ventricle, as before. Similar degree of narrowing of the left greater  than right lateral ventricles and near complete effacement of the  third ventricle. There is rightward midline shift measuring 4-5 mm,  unchanged. Similar degree of narrowing/encroachment on the suprasellar  cistern. No definite progressive herniation. No hyperdense acute  intracranial hemorrhage.    The visualized aspects of the paranasal sinuses and mastoids are  clear. The calvarium appears intact.      Impression    IMPRESSION: No significant interval change in the large left parietal  mass with similar degree of local mass effect, vasogenic edema in the  underlying brain parenchyma, narrowing of the supratentorial  ventricular system, and rightward midline shift, as detailed.    ANTONIO ALMEIDA MD         SYSTEM ID:  [id number]   MR Brain w/o & w Contrast    Narrative    EXAM: MR BRAIN W/O and W CONTRAST  LOCATION: [facility]  DATE/TIME: 5/27/2023 9:51 AM CDT    INDICATION: Postop.  COMPARISON: Preoperative brain MRI 05/23/2023 head CT 05/25/2023 also preoperative.  CONTRAST: 9mL Gadavist  TECHNIQUE: Routine multiplanar multisequence head MRI without and with intravenous contrast.    FINDINGS: Since previous evaluation, interval postoperative changes of left parietal occipital craniotomy according to clinical history performed for resection/treatment of large heterogeneous enhancing left parietal supratentorial mass lesion, primary   surgical pathology as of time of this region reported as favor primary brain tumor type unspecified.    INTRACRANIAL CONTENTS: Since previous MRI/CT, previous identified bulky large enhancing mass appears to  have been totally or near totally resected, with cavity of hypointense T1 hyperintense T2 signal fluid, recent blood products and associated marginal   enhancement about the cavity series 10 image 23 series 15 image 151. There is restricted diffusion along the margins of the resection cavity likely representing postoperative recent ischemia and/or restricted signal due to cavity blood products. This   could be evaluated on future follow-up with no additional recent ischemic changes intracranially elsewhere. Cavity measures about 5 cm ML dimension, 1.8 cm radially. The marginal enhancement on today's evaluation is presumed postoperative although   residual marginal neoplasm cannot be fully excluded. This examination can serve as a baseline for future follow-up studies. There is diminished mass effect upon the adjacent parenchyma status post resection including upon the posterior body and occipital   horn of the left lateral ventricle. Increased caliber specifically of the occipital horn of the left lateral ventricle with diminished left to right shift at the subfalcine level and posterior falx level, now measures about 4 mm. Nonenhancing T2/FLAIR   hypertense signal abnormality about the resection margins of the posterior left frontal lobe and anterior parietal lobe medially extending about the margins of the posterior body and occipital horn left lateral ventricle and involve the left splenium of   the corpus callosum as before likely representing residual peritumoral edema less likely nonenhancing infiltrative neoplasm. The extent of this T2 signal abnormality is stable from the preoperative study. Postoperative extra-axial gas signal overlies the   paramedian left frontal level. A small amount of extra-axial blood products/fluid deep to the craniotomy level measures about 6 mm series 11 image 19. Stable appearance to lobular dural left parafalcine level posteriorly series 13 image 23 may reflect a   small  meningioma versus dural metastasis. Dural venous sinus enhancement shows no definite thrombosis with some areas of presumed extrinsic mass effect and/or discontinuity of the superior sagittal sinus enhancement series 14 image 93 at and to the left   of midline. Transverse sinus and sigmoid sinus enhancement is satisfactory. No pathologic enhancement the skull base level or upper cervical cord. Position of the cerebellar tonsils is satisfactory.    SELLA: Rim-enhancing lesion within the left paramedian sella/adenohypophysis series 15 image 94-96 could represent a benign versus metastatic lesion of the adenohypophysis as before but remains indeterminate with some displacement of the pituitary stalk.   Dedicated MRI with thin section/dynamic imaging on follow-up could be helpful to assess.    OSSEOUS STRUCTURES/SOFT TISSUES: Soft tissue swelling overlies the craniotomy level with no abnormal diploic space or orbital enhancement. The major intracranial vascular flow voids are maintained.     ORBITS: No abnormality accounting for technique. No pathologic orbital enhancement.    SINUSES/MASTOIDS: Mild mucosal thickening scattered about the paranasal sinuses. Scattered fluid/membrane thickening in the mastoid air cells bilaterally.       Impression    IMPRESSION:  1.  Since previous MR/CT evaluations listed above, interval postoperative changes of left parietal occipital craniotomy with resection of previously identified heterogeneous enhancing large left parietal mass. 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    2.  Decreased mass effect status post tumor resection upon the left cerebral hemisphere left lateral ventricle and left corpus callosum with improved caliber of the occipital horn of the left lateral ventricle and diminished shift of midline.    3.  There is some in enhancement along the margins of the resection cavity with  associated restricted diffusion presumed postoperative in nature. Residual marginal tumor cannot be fully excluded and today's evaluation can serve as a baseline for future   follow-up studies.    4.  Stable appearance to nonenhancing T2/FLAIR signal abnormality about the resection cavity margin extends about the posterior body and occipital horn of the left lateral ventricle stable from prior study probably represent peritumoral edema.   Nonenhancing infiltrative neoplasm is considered less likely but could be evaluated on follow-up study as well.    5.  Fluid/blood products, gas density at the left supratentorial level including the resection cavity and overlying the paramedian left frontal lobe anteriorly presumed postoperative. Postoperative fluid deep to the craniotomy site with some dural   enhancement.    6.  Enhancement abnormality redemonstrated dural based left parafalcine level, and associated with the sella unchanged from preoperative study with stable diagnostic considerations and follow-up recommendations as listed above.    7.  There is no additional areas of recent infarction present intracranially beyond the resection cavity level.    8.  Additional details and full description are given above.   Echocardiogram Complete     Value    LVEF  60-65%    Narrative    [id number]  [id number]  [id number]  [id number]^MAY^BREEZY     [facility]  Echocardiography Laboratory  [address] [place] [address]     Name: SUKUMAR NORRIS  MRN: [id number]  : 1960  Study Date: 2023 02:47 PM  Age: 63 yrs  Gender: Female  Patient Location: [facility]  Reason For Study: VSD  Ordering Physician: MAY POZO  Referring Physician: Gia Ref-Primary, Physician  Performed By: Kristie Melo RDCS     BSA: 2.1 m2  Height: 71 in  Weight: 196 lb  HR: 58  BP: 114/75 mmHg  ______________________________________________________________________________  Procedure  Complete  Portable Echo Adult.  ______________________________________________________________________________  Interpretation Summary     Small restrictive membranous ventricular septal defect  Left to right ventricular shunt, small  Mild to moderate valvular aortic stenosis.  The visual ejection fraction is 60-65%.  Left ventricular systolic function is normal.  There is trace aortic regurgitation.  The ascending aorta is Mildly dilated.  ______________________________________________________________________________  Left Ventricle  The left ventricle is normal in size. There is normal left ventricular wall  thickness. Diastolic Doppler findings (E/E' ratio and/or other parameters)  suggest left ventricular filling pressures are normal. Grade I or early  diastolic dysfunction. The visual ejection fraction is 60-65%. Left  ventricular systolic function is normal. Small restrictive membranous  ventricular septal defect. Left to right ventricular shunt, small.     Right Ventricle  The right ventricle is normal in size and function.     Atria  Normal left atrial size. Right atrial size is normal. There is no color  Doppler evidence of an atrial shunt.     Mitral Valve  There is trace mitral regurgitation.     Tricuspid Valve  There is trace tricuspid regurgitation.     Aortic Valve  The aortic valve is not well visualized. Thickened aortic valve leaflets.  There is trace aortic regurgitation. The calculated aortic valve are is 1.3  cm^2. The peak AoV pressure gradient is 34.1 mmHg. The mean AoV pressure  gradient is 19.0 mmHg. Mild to moderate valvular aortic stenosis.     Pulmonic Valve  There is trace pulmonic valvular regurgitation. Normal pulmonic valve  velocity.     Vessels  The aortic root is normal size. The ascending aorta is Mildly dilated. IVC  diameter <2.1 cm collapsing >50% with sniff suggests a normal RA pressure of 3  mmHg.     Pericardium  There is no pericardial effusion.     Rhythm  Sinus rhythm was  noted.  ______________________________________________________________________________  MMode/2D Measurements & Calculations  IVSd: 0.92 cm     LVIDd: 5.0 cm  LVIDs: 3.2 cm  LVPWd: 0.93 cm  FS: 36.7 %  LV mass(C)d: 163.0 grams  LV mass(C)dI: 78.0 grams/m2  Ao root diam: 3.3 cm  LA dimension: 3.7 cm  asc Aorta Diam: 3.6 cm  LA/Ao: 1.1  LVOT diam: 2.0 cm  LVOT area: 3.1 cm2  RVOT diam: 3.2 cm  LA Volume (BP): 69.8 ml  LA Volume Index (BP): 33.4 ml/m2  RV Base: 4.0 cm     RWT: 0.38  TAPSE: 3.0 cm     Doppler Measurements & Calculations  MV E max srikanth: 65.6 cm/sec  MV A max srikanth: 57.0 cm/sec  MV E/A: 1.2  MV dec time: 0.20 sec  Ao V2 max: 292.0 cm/sec  Ao max P.1 mmHg  Ao V2 mean: 204.0 cm/sec  Ao mean P.0 mmHg  Ao V2 VTI: 63.9 cm  NARDA(I,D): 1.3 cm2  NARDA(V,D): 1.2 cm2  LV V1 max P.0 mmHg  LV V1 max: 112.0 cm/sec  LV V1 VTI: 26.5 cm  SV(LVOT): 83.3 ml  SI(LVOT): 39.8 ml/m2  PA acc time: 0.14 sec  TR max srikanth: 234.0 cm/sec  TR max P.9 mmHg  Qp/Qs: 1.6/1.0  AV Srikanth Ratio (DI): 0.38  NARDA Index (cm2/m2): 0.62  E/E' av.6  Lateral E/e': 5.3     Medial E/e': 7.9  PVR: 140.3  RV S Srikanth: 14.0 cm/sec     ______________________________________________________________________________  Report approved by: Robi Banerjee 2023 04:40 PM               Discharge Medications   Current Discharge Medication List      START taking these medications    Details   acetaminophen (TYLENOL) 325 MG tablet Take 2 tablets (650 mg) by mouth every 4 hours as needed for other (For optimal non-opioid multimodal pain management to improve pain control.)  Qty: 100 tablet, Refills: 0    Associated Diagnoses: Brain mass      dexamethasone (DECADRON) 1 MG tablet Take 4 tablets (4 mg) by mouth daily (with breakfast) for 3 days, THEN 3 tablets (3 mg) daily (with breakfast) for 4 days, THEN 2 tablets (2 mg) daily (with breakfast) for 4 days.  Qty: 32 tablet, Refills: 0    Associated Diagnoses: Brain mass      levETIRAcetam  (KEPPRA) 500 MG tablet Take 1 tablet (500 mg) by mouth 2 times daily  Qty: 60 tablet, Refills: 0    Comments: Future refills by PCP  Associated Diagnoses: Brain mass      oxyCODONE (ROXICODONE) 5 MG tablet Take 1 tablet (5 mg) by mouth every 4 hours as needed for moderate to severe pain  Qty: 40 tablet, Refills: 0    Associated Diagnoses: Brain mass      senna-docusate (SENOKOT-S/PERICOLACE) 8.6-50 MG tablet Take 1 tablet by mouth 2 times daily as needed for constipation  Qty: 30 tablet, Refills: 0    Associated Diagnoses: Brain mass           Allergies   No Known Allergies

## 2023-05-29 NOTE — CONSULTS
Care Management Initial Consult    General Information  Assessment completed with: Patient, Family,    Type of CM/SW Visit: Initial Assessment    Primary Care Provider verified and updated as needed:     Readmission within the last 30 days:        Reason for Consult: discharge planning  Advance Care Planning:            Communication Assessment  Patient's communication style: spoken language (English or Bilingual)    Hearing Difficulty or Deaf: no   Wear Glasses or Blind: yes    Cognitive  Cognitive/Neuro/Behavioral: WDL  Level of Consciousness: alert  Arousal Level: opens eyes spontaneously  Orientation: oriented x 4  Mood/Behavior: calm, cooperative  Best Language: 0 - No aphasia  Speech: clear, spontaneous, logical, slow    Living Environment:   People in home: spouse     Current living Arrangements: house      Able to return to prior arrangements: yes       Family/Social Support:  Care provided by: self, spouse/significant other  Provides care for:    Marital Status:   , Children          Description of Support System: Involved, Supportive    Support Assessment: Adequate social supports, Adequate family and caregiver support    Current Resources:   Patient receiving home care services:       Community Resources:    Equipment currently used at home: none  Supplies currently used at home:      Employment/Financial:  Employment Status:          Financial Concerns:             Does the patient's insurance plan have a 3 day qualifying hospital stay waiver?  No    Lifestyle & Psychosocial Needs:  Social Determinants of Health     Tobacco Use: Low Risk  (5/26/2023)    Patient History      Smoking Tobacco Use: Never      Smokeless Tobacco Use: Never      Passive Exposure: Not on file   Alcohol Use: Not At Risk (1/18/2021)    AUDIT-C      Frequency of Alcohol Consumption: Never      Average Number of Drinks: Not on file      Frequency of Binge Drinking: Not on file   Financial Resource Strain: Not on file    Food Insecurity: Not on file   Transportation Needs: Not on file   Physical Activity: Not on file   Stress: Not on file   Social Connections: Not on file   Intimate Partner Violence: Not on file   Depression: Not on file   Housing Stability: Not on file       Functional Status:  Prior to admission patient needed assistance:              Mental Health Status:          Chemical Dependency Status:                Values/Beliefs:  Spiritual, Cultural Beliefs, [employer]ism Practices, Values that affect care:                 Additional Information:  CM consulted for discharge planning/disposition. SW completed chart review. Patient is a 63 year old female admitted on 5/23/2023 with intermittent right sided weakness. CT imaging found left parietal lobe mass with moderate vasogenic edema.   SW met with patient and family at bedside to introduce self and role and discuss discharge planning. Previous recommendation from therapy was for ARU but patient has been progressing and now they feel patient would be ok discharging home with 24hr assist from family and outpatient PT. SW did discuss home care with patient and she considered it, but feels she would be fine with outpatient PT instead. She does have a ride to and from therapy appt. Pt did state she wants to talk with her  about this plan prior to a final decision being made.   No other needs indicated. SW will continue to follow as needed.     LUMA Pugh

## 2023-05-29 NOTE — PROGRESS NOTES
[facility]    Neurosurgery  Daily Note    Assessment & Plan   Procedure(s):  CRANIOTOMY, USING OPTICAL TRACKING SYSTEM, WITH NEOPLASM EXCISION STEALTH ASSISTED LEFT FRONTOPARIETAL CRANIOTOMY WITH RESCETION OF BRAIN TUMOR   3 Days Post-Op  Pain well managed.neuro intact. KATELYN output 30 overnight. Incision is c/d/i. KATELYN drain removed, anna stitch tied down.         Plan:  -MRI reviewed with Dr. Monroy. Dr. Monroy will review findings with patient in regards to enhancement abnormality dural based left parafalcine level and associated with sella   -OK for q4H neuro checks   -HOB30  -SBP<160  -KATELYN drain removed  -Advance activity as tolerated  -Continue supportive and symptomatic treatment  -Start or continue physical therapy  -Pain control measures  -Decadron -2 week taper to off orders are in  -Continue Keppra 500 BID  -subcutaneous heparin to start Monday if does not discharge  -PT, OT consulted; appreciate involvement  -Advance diet as tolerated  -Routine wound care  -Discharge pending therapy recommendations. Cleared from NSGY standpoint when therapies clear. NSGY discharge instructions in.     Plans reviewed with Dr. Monroy    Discharge pending therapy recommendations.     Kat Arita PA-C  [facility] Neurosurgery  [facility]  [address]  [address] [place] [id number]    Tel [phone]  Pager [phone]    Principal Problem:    Brain mass      Kat Arita PA-C    Interval History   stable    Physical Exam   Temp: 98.1  F (36.7  C) Temp src: Oral BP: 138/84 Pulse: (!) 49   Resp: 16 SpO2: 94 % O2 Device: None (Room air)    Vitals:    05/25/23 0400 05/26/23 0500 05/27/23 0600   Weight: 200 lb 9.9 oz (91 kg) 207 lb 0.2 oz (93.9 kg) 206 lb (93.4 kg)     Vital Signs with Ranges  Temp:  [97.5  F (36.4  C)-98.7  F (37.1  C)] 98.1  F (36.7  C)  Pulse:  [49-91] 49  Resp:  [16-18] 16  BP: (116-145)/(68-84) 138/84  SpO2:  [94 %-98 %] 94 %  I/O last 3 completed  shifts:  In: -   Out: 90 [Drains:90]    Awake, alert, appropriate   II-XII grossly intact  Incision c/d/i  KATELYN drain removed in sterile fashion, anna stitch tied down  5/5motor strength  Negative drift  Negative clonus     Medications        dexamethasone  4 mg Intravenous Q24H    Followed by     [START ON 6/2/2023] dexamethasone  3 mg Intravenous Q24H    Followed by     [START ON 6/6/2023] dexamethasone  2 mg Intravenous Q24H     gadobutrol  10 mL Intravenous Once     levETIRAcetam  500 mg Oral BID     polyethylene glycol  17 g Oral Daily     senna-docusate  2 tablet Oral BID     sodium chloride (PF)  10 mL Intravenous Once     sodium chloride (PF)  3 mL Intracatheter Q8H     sodium chloride (PF)  3 mL Intracatheter Q8H       Plans discussed with Dr. Monroy who was in agreement with plans    Kat Arita PA-C  [facility] Neurosurgery  [address]  [address]  [place] [address]    Tel [phone]  Pager [phone]

## 2023-05-29 NOTE — PLAN OF CARE
Reason for Admission: POD 3 L crani - tumor resection    Cognitive/Mentation: A/Ox 4  Neuros/CMS: Intact ex slight decreased sensation to R foot - improving  VS: stable on RA  Tele: SB.  GI: Continent.  : Continent.  Pulmonary: LS clear.  Pain: incision pain, headache. Scheduled tylenol given, prn robaxin given x1.      Drains/Lines: KATELYN Drain. R PIV - SL  Skin: crani incision - dressing C,D, I  Activity: Assist x 1 with GBW.  Diet: regular with thin liquids. Takes pills whole.     Therapies recs: pending  Discharge: pending medical clearance

## 2023-05-29 NOTE — PROGRESS NOTES
Pt stable, neuros and VS unchanged. Discharging home with outpatient therapy. Family to transport. Walker and discharge.

## 2023-05-30 NOTE — PLAN OF CARE
Occupational Therapy Discharge Summary    Reason for therapy discharge:    Discharged to home with outpatient therapy.    Progress towards therapy goal(s). See goals on Care Plan in [facility] electronic health record for goal details.  Goals not met.  Barriers to achieving goals:   discharge from facility.    Therapy recommendation(s):    Continued therapy is recommended.  Rationale/Recommendations:  ARU to address safety and IND in I/ADLs.

## 2023-06-01 ENCOUNTER — THERAPY VISIT (OUTPATIENT)
Dept: OCCUPATIONAL THERAPY | Facility: CLINIC | Age: 63
End: 2023-06-01
Attending: HOSPITALIST
Payer: COMMERCIAL

## 2023-06-01 ENCOUNTER — THERAPY VISIT (OUTPATIENT)
Dept: PHYSICAL THERAPY | Facility: CLINIC | Age: 63
End: 2023-06-01
Attending: HOSPITALIST
Payer: COMMERCIAL

## 2023-06-01 DIAGNOSIS — G93.89 BRAIN MASS: ICD-10-CM

## 2023-06-01 LAB
PATH REPORT.COMMENTS IMP SPEC: ABNORMAL
PATH REPORT.COMMENTS IMP SPEC: YES
PATH REPORT.FINAL DX SPEC: ABNORMAL
PATH REPORT.GROSS SPEC: ABNORMAL
PATH REPORT.INTRAOP OBS SPEC DOC: ABNORMAL
PATH REPORT.MICROSCOPIC SPEC OTHER STN: ABNORMAL
PATH REPORT.RELEVANT HX SPEC: ABNORMAL
PHOTO IMAGE: ABNORMAL

## 2023-06-01 PROCEDURE — 97165 OT EVAL LOW COMPLEX 30 MIN: CPT | Mod: GO

## 2023-06-01 PROCEDURE — 97161 PT EVAL LOW COMPLEX 20 MIN: CPT | Mod: GP | Performed by: PHYSICAL THERAPIST

## 2023-06-01 PROCEDURE — 97116 GAIT TRAINING THERAPY: CPT | Mod: GP | Performed by: PHYSICAL THERAPIST

## 2023-06-01 NOTE — PROGRESS NOTES
PHYSICAL THERAPY EVALUATION  Type of Visit: Evaluation    See electronic medical record for Abuse and Falls Screening details.    Subjective         Presenting condition or subjective complaint: I had a brain tumor removed from the left side of my brain.  Date of onset: 05/23/23 (date of sudden R sided weakness)    Relevant medical history: Heart problems   Dates & types of surgery: Hysterectomy May 2014, heart cath when I was around 6 yrs old    Per chart review:   5/23/23-5/29/23 ED admission -     Symptom onset: Patient reports that 2 days prior she had an episode of right lower extremity weakness that lasted roughly 15 minutes before resolving.  On 5/23 around 4 PM she had sudden onset of right upper and lower extremity weakness, right-handed numbness, wavy lines in vision, and coordination difficulty with right hand.     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   Other orders: OT (evaluated 6/1/23), SLP, walker    --    Sensation: Reports feeling dec sensation R foot to calf.    Weakness: denies feeling a sense of weakness on R side.     Ambulation: Currently with 2ww at all times and wearing gait belt. Prior to sxs onset of brain tumor - walking w/o AD. Reports eager to progress to SPC - aware she is currently reliant on AD for safety. Reports she has tried walking without the walker a couple of times with there dtr's support from the gait belt - outsides yesterday down slopped driveway to mailbox. Wants to walk her dog again.    Balance:  Reports moments of instability 'legs cross' but can self-correct without falls/near falls. Reports using walker in shower - able to  close eyes and maintain balance. Cautious around dog to avoiding tripping.  Reports she went to a PT course in the past and was told she has an 'inner ear' issue but doesn't know what - she has had difficulty with her balance with EC for a long time.     Falls hx: Fell in February when traveling with her . Was wearing sandals and caught her R toe on the floor, was not injured. Notes there was another time her R foot slipped off her stair and fell to the next one really hard. Ordered  for their wooden stairs afterwards. Reports her daughters had noticed changes in her balance in the last year.    Stairs: Currently navigates with step-to gait [place] stairs[place] a railing to a dock/[place] launch. She is eager to be able to negotiate these stairs to access the boat - used to use these stairs for exercise.    Work: Retired Physical Therapy Assistant - 3 yrs ago, working in [employer]     Transportation: not currently driving - dropped off today by her dtr for today's apt.     I/ADLs:  Reports she made breakfast, dishes, dressed, showered mod(I) holding walker feeling good with these tasks. Pacing herself since she has been home.     Exercise: Currently, none. Previously was walking x45-60 minutes with dog every day.  Would also navigate outdoor stairs for cardio x30-45 minutes. Owns elliptical, TM and rowing machine, thearpy ball -used on poor-weather days.      Prior diagnostic imaging/testing results:       Prior therapy history for the same diagnosis, illness or injury: No      Prior Level of Function   (I) mobility and self cares    Living Environment  Social support: With a significant other or spouse   Type of home: 2-story   Stairs to enter the home: No       Ramp: No   Stairs inside the home: Yes 13 Is there a railing: Yes   Help at home: Home management tasks (cooking, cleaning); Home and Yard maintenance tasks; Assist for driving and community activities  Equipment owned:  Walker     Employment: No    Hobbies/Interests: Reading, exercising cooking and biking.    Patient goals for therapy: I have decreased feeling in my right foot. A mild difficulty with right hand.       Objective   Cognitive Status Examination  Orientation:    Level of Consciousness: Alert  Follows Commands and Answers Questions: 100% of the time  Personal Safety and Judgement: Intact    BED MOBILITY: Independent    TRANSFERS: Independent    GAIT:   Over short distances indoors with 2ww: maintains slow, mildly uncoordinated but steady gait.  Stairs: with B UE support from railing and SPC, maintains reciprocal gait, good foot clearance, controled with attn    SPECIAL TESTS  Functional Gait Assessment (FGA) TOTAL SCORE: (MAXIMUM SCORE 30): 10 (< 22 = inc fall risk)   10 Meter Walk Test (Comfortable)  1.12 m/s with 2ww.    10 Meter Walk Test (Fast)  1.57 m/s with 2ww   6 Minute Walk Test (6MWT)           Moran Balance Scale (BBS)     5 Times Sit-to-Stand (5TSTS)  10.93 sec (> 12 sec = inc fall risk) decreased L LE WBing, maintains balance     SENSATION: TBA    REFLEXES:   COORDINATION: Functionally impaired.  MUSCLE TONE:         Assessment & Plan   CLINICAL IMPRESSIONS   Medical Diagnosis: Brain mass (G93.89)    Treatment Diagnosis: movement pattern coordination deficit, sensory detection deficit s/p removal L parietal brain mass   Impression/Assessment: Patient is a 63 year old female with gait/balance complaints.  The following significant findings have been identified: Instability. These impairments interfere with their ability to perform self care tasks, recreational activities, household chores, household mobility and community mobility as compared to previous level of function.     Clinical Decision Making (Complexity):   Clinical Presentation: Stable/Uncomplicated  Clinical Presentation Rationale: based on medical and personal factors listed in PT evaluation  Clinical Decision Making (Complexity): Low  complexity    PLAN OF CARE  Treatment Interventions:  Interventions: Gait Training, Neuromuscular Re-education, Therapeutic Activity, Therapeutic Exercise    Long Term Goals     PT Goal 1  Goal Identifier: Functionaly Ambulation/gait speed:  Goal Description: Pt to demonstrate improved gait speed and stability walking a self selected pace >/= 1.2 m/s without AD to no longer be considered inc risk for falls and appropraite for walking outside.  Rationale: to maximize safety and independence within the community  Goal Progress: at [facility], 6/1/23: 1.12 m/s with 2ww: maintains slow, mildly uncoordinated but steady gait. At home, using 2ww at all times and wearing gait belt. Prior to sxs onset of brain tumor - walking w/o AD. Wants to walk her dog again.  Target Date: 07/27/23  PT Goal 2  Goal Identifier: Gait stability/FGA  Goal Description: Pt to score >/= 27 on FGA to demonstrate significantly improved gait stability, considered WNL for age-matched norms and no longer considered inc risk for falls.  Rationale: to maximize safety and independence within the community  Goal Progress: at [facility], 6/1/23: (MAXIMUM SCORE 30): 10 (< 22 = inc fall risk)  Target Date: 07/27/23  PT Goal 3  Goal Identifier: Balance:  Goal Description: Pt to maintain romberg foam EC x30 sec to demonstrate good static stability with dec visual input on uneven surfaces for showering and navigaiting in dim lighting with improved safety.  Rationale: to maximize safety and independence with performance of ADLs and functional tasks  Goal Progress: at [facility], 6/1/23: Reports moments of instability 'legs cross' but can self-correct without falls/near falls. Reports using walker in shower - able to close eyes and maintain balance. Cautious around dog to avoiding tripping.  Reports she has had difficulty with her balance with EC for a long time.  Target Date: 07/27/23  PT Goal 4  Goal Identifier: Stairs:  Goal Description: Pt to demonstrate and report ability to  safely navigate 64 steps with railing maintainining stability in order to access her pontoon and report return to navigaiting stairs for exercise.  Rationale: to maximize safety and independence within the community  Goal Progress: at [facility], 6/1/23: with B UE support from railing and SPC, maintains reciprocal gait, good foot clearance, controled with attn[place] a railing to a dock/[place] launch - used to use these stairs for exercise.  Target Date: 07/27/23  PT Goal 5  Goal Identifier: Exercise:  Goal Description: Once precautions towards exertion are lifted, pt to establish appropraite high intensity walking program towards improved endurance and gait stability, reporting consistency and understanding to continue following d/c from PT.  Goal Progress: at [facility], 6/1/23: Currently, none. Previously was walking x45-60 minutes with dog every day.  Would also navigate outdoor stairs for cardio x30-45 minutes. Owns elliptical, TM and rowing machine, thearpy ball -used on poor-weather days.  Target Date: 07/27/23      Frequency of Treatment: 2x/week  Duration of Treatment: 8 weeks    Recommended Referrals to Other Professionals:   Education Assessment:        Risks and benefits of evaluation/treatment have been explained.   Patient/Family/caregiver agrees with Plan of Care.     Evaluation Time:     PT Eval, Low Complexity Minutes (73315): 33               Signing Clinician: Beatrice Mott PT

## 2023-06-01 NOTE — PROGRESS NOTES
"OCCUPATIONAL THERAPY EVALUATION  Type of Visit: Evaluation    See electronic medical record for Abuse and Falls Screening details.    Subjective      Presenting condition or subjective complaint: \"I had a brain tumor removed from the left side of my brain.\"  Date of onset: 05/23/23 (Admitted 5/23/23, date of resection 5/26/23)    Relevant medical history: Heart problems   Dates & types of surgery: Hysterectomy May 2014, heart cath when I was around 6 yrs old    Prior diagnostic imaging/testing results:     CT, MRI, and EEG completed during work-up at hospital with brain mass found, see 5/23/23 hospital episode of care for details   Prior therapy history for the same diagnosis, illness or injury: No      Prior Level of Function   Transfers: Independent  Ambulation: Independent  ADL: Independent  IADL: Driving, Finances, Housekeeping, Laundry, Meal preparation, Medication management, Yard work    Living Environment  Social support: With a significant other or spouse   Type of home: 2-story   Stairs to enter the home: No       Ramp: No   Stairs inside the home: Yes 13 Is there a railing: Yes Reports going downstairs feels less comfortable/more scary.  Help at home: Home management tasks (cooking, cleaning); Home and Yard maintenance tasks; Assist for driving and community activities  Equipment owned: Walker   Lives with spouse, has adult daughter who lives nearby.     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   Hobbies/Interests: Reading, exercising cooking and biking.    Patient goals for therapy: I have decreased feeling in my right foot. A mild difficulty with right hand. Reports her goals for OT: using her R arm appropriately and consistently    Pain assessment: Pain denied. Reports she is taking intermittent Tylenol for her head suture.     Objective     Cognitive Status Examination  Orientation: Oriented to person, place and time   Level of Consciousness: " "Alert  Follows Commands and Answers Questions: 100% of the time, Follows single step instructions  Personal Safety and Judgement: Intact  Memory: Reports changes in memory initially and occasional difficulty with recall (see comments below)  Attention: To be further assessed  Organization/Problem Solving: To be further assessed  Executive Function: To be further assessed   Comments: Reports she felt very frustrated on Sunday in the hospital - she was watching [employer] Sunday morning and her daughter asked her later what it was about, and she could only remember one piece on [place]. Reports she now feels she could remember a little bit more, but at the time it was very challenging. Notes she asked her daughter whether she thought she was having difficulties grasping information, and her daughter reported \"no I don't think so at all.\" Pt reports she does feel a little \"slower\" and sometimes has difficulty with word-finding or putting in wrong word into her sentence.    VISUAL SKILLS  Visual Acuity: Wears glasses Progressive lenses  Visual Field: Appears normal  Visual Attention: Appears normal  Oculomotor: Denies changes in her vision following surgery, in fact feels her vision is a bit improved as she was having some difficulties with R eye having uncontrolled movements intermittently when she would complete sit to stand. Denies changes in peripheral vision. Oculomotor smooth pursuits with brief visual screen this date.    SENSATION: Reports numbness throughout R arm (but ends before the hand) as well as RLE (throughout leg and foot)    POSTURE: WFL  RANGE OF MOTION: UE AROM WFL  STRENGTH: UE Strength WFL per gross ROM screen. However, pt reports generalized global deconditioning throughout body. Will plan to further assess and address as appropriate. Pt with lifting restriction (<8-10 lbs or a gallon of milk).   Strength (lbs) L: 81# (+3 SD) R: 84# (+3 SD)   Lateral Pinch (lbs) L: 13# (WNLs) R: 14# (WNLs)   3 " "Point Pinch (lbs) L: 11#  (1-2 SD below the mean) R: 14# (WNLs)        Hand Dominance: Right   MUSCLE TONE: WFL  COORDINATION: Gross Motor Coordination: Reports difficulty reaching for targets with R hand initially but this has improved. Was able to wash dishes, including wine glasses (fragile), since being home. Feels it is starting to improve back to baseline but wants to be sure she is using her full RUE appropriately.  Fine Motor Coordination: Reports handwriting feels a little \"off.\" Was able to complete button on her pants without significant difficulty. Reports R hand feels a \"tiny clumsy\" with 9 Hole Peg Test and L hand feels \"smoother.\"  Left Hand, Nine Hole Peg Test (sec): 25.4 (> 2 SD below the mean)  Right Hand, Nine Hole Peg Test (sec): 26.4  (> 2 SD below the mean)  BALANCE: Decreased balance from baseline. Utilizes FWW this date and had no AE prior to brain mass and surgery. Reports balance continues to feel \"off\" but is improving. Has further assessment with PT later today, will defer further balance assessment to PT.    FUNCTIONAL MOBILITY  Assistive Device(s): Walker (front wheeled)  Ambulation: Ambulation slowed, mod I with walker. Did not use adaptive device prior to brain mass and surgery.  Wheelchair: n/a    BED MOBILITY: WFL    TRANSFERS: WFL, mod I (increased time, hand hold)    BATHING: WFL, mod I (increased time, hand hold)  Equipment:  Shower is large enough for her to bring her walker into the shower. Does not have a shower chair or bars. Had her daughter DELLA in the room next door but she was able to complete the shower on her own, including closing her eyes and washing hair with both hands. Denies any significant fatigue with showering.    UPPER BODY DRESSING: WFL  Equipment: none    LOWER BODY DRESSING: WFL  Equipment: none    TOILETING: WFL  Equipment: none    GROOMING: WFL  Equipment: none    EATING/SELF FEEDING: WFL   Equipment: none Endorses good appetite.    ACTIVITY TOLERANCE: " "Reports energy is \"good, but I was active beforehand.\" Reports she walks her dog every day up to 40 minutes to 1 hour at a time. She also has elliptical, treadmill, and rowing machine available at home but wasn't using this as frequently. [place] get [place] to the boat from her home. Hadn't done those stairs in a while but was doing them about an hour at a time during COVID pandemic. Reports she is sleeping better every day since being home from the hospital and no longer having neuro checks every hour. Reports emotional changes/lability, has been working with a therapist even prior to surgery. Is on lifting restrictions (nothing over a jug of milk, approximately 8-10 lbs).    INSTRUMENTAL ACTIVITIES OF DAILY LIVING (IADL):   Medication management: Manages SBA with taking pills directly from bottles, has  confirm she is taking the correct medications. Reports she doesn't like her Keppra medication as it makes her feel \"off.\"  Meal Planning/Prep: Reports she has started getting back into meal prep activities. Has had a little concern that maybe she would forget to turn off the stove/oven. Reports she has to think harder than normal about the steps/process to complete her meal prep activities.  Home/Financial Management: Reports she put her shirt in the dryer to get wrinkles out but hasn't done any other laundry. Reports her  is around to help carry the basket and he does his own laundry so would be willing to assist with hers.  Reports she has a robotic vacuum.  Communication/Computer Use: Uses cell phone and iPad regularly, denies any trouble using these devices since her surgery. Reports sending virtual messages has been a little tiring.  Community Mobility: Reports that she was told her doctor would help determine her getting back to driving. Would not feel comfortable driving yet and especially not on Keppra. Is looking forward to driving again sometime in the " future.            Assessment & Plan   CLINICAL IMPRESSIONS   Medical Diagnosis: Brain mass    Treatment Diagnosis: Decreased safety and IND with ADLs/IADLs    Impression/Assessment: Pt is a 63 year old female presenting to Occupational Therapy due to brain pass, s/p resection on 5/26/23.  The following significant findings have been identified: Impaired activity tolerance, Impaired balance, Impaired cognition, Impaired coordination, Impaired mobility, Impaired motor control, Impaired sensation, Impaired strength and Post-surgical precautions.  These identified deficits interfere with their ability to perform self care tasks, recreational activities, household chores, driving , household mobility, community mobility, medication management, financial management,  yard work, meal planning and preparation and community or volunteer activities as compared to previous level of function.     Clinical Decision Making (Complexity):   Assessment of Occupational Performance: 5 or more Performance Deficits  Occupational Performance Limitations: bathing/showering, functional mobility, care of others, driving and community mobility, health management and maintenance, home establishment and management, meal preparation and cleanup, shopping, leisure activities and social participation  Clinical Decision Making (Complexity): Low complexity    PLAN OF CARE  Treatment Interventions:   Interventions: Cognitive Skills, Community/Work Reintegration, Self-Care/Home Management, Therapeutic Activity, Therapeutic Exercise    Long Term Goals   OT Goal 1  Goal Identifier: Energy Conservation, Work Simplification  Goal Description: Pt will demonstrate 3 energy conservation strategies (e.g. pacing, planning, prioritizing, sleep hygiene, etc.) to facilitate fatigue management with ADL/IADL tasks (e.g. laundry, meal preparation, walking/exercise).  Target Date: 08/24/23  OT Goal 2  Goal Identifier: CRISTAL Fine Motor Coordination  Goal Description:  Pt will demonstrate improved FMC for IND with ADLs/IADLs (e.g. clothing fasteners, pinching packages, using tools, writing/utensil use, typing) as measured by 25% improvement with R hand on 9 hole peg test.  Target Date: 08/24/23  OT Goal 3  Goal Identifier: UE HEP  Goal Description: Pt will demonstrate independent completion of 1-3 activity HEP for UE coordination/endurance to promote independence with ADLs/IADLs (e.g. retrieving object from cabinet or refrigerator, hanging laundry, handwriting, meal preparation).  Target Date: 08/24/23  OT Goal 4  Goal Identifier: Memory/Cognition  Goal Description: Patient to verbalize understanding of cognitive screen results and identify 3 strategies to increase patient's safety and independence in the home and community setting.  Target Date: 08/24/23  OT Goal 5  Goal Identifier: IADLs/Community Mobility  Goal Description: Pt will demonstrate visual and divided attention WFL for safe functional and community mobility tasks (navigating new environments, driving, grocery shopping) by scoring WFLs on 3/3 IADL tasks (Dynavision, Scan course, SDMT, trail-making, BiVABA scan sheets, etc.).  Target Date: 08/24/23      Frequency of Treatment: 1x/week  Duration of Treatment: 12 weeks (starting with 8 weeks scheduled)     Recommended Referrals to Other Professionals: Occupational Therapy  Education Assessment: Learner/Method: Patient;Listening  Education Comments: OT evaluation completed, educated in OT role and POC. Educated in building activity tolerance within activity restrictions with education in signs of activity intolerance (ex. increased headache, sweating). Educated in using RUE as much as possible with daily activities to promote motor retraining.     Risks and benefits of evaluation/treatment have been explained.   Patient/Family/caregiver agrees with Plan of Care.     Evaluation Time:    OT Eval, Low Complexity Minutes (43208): 45       Signing Clinician: Demi Lucio  OTR

## 2023-06-05 ENCOUNTER — THERAPY VISIT (OUTPATIENT)
Dept: PHYSICAL THERAPY | Facility: CLINIC | Age: 63
End: 2023-06-05
Attending: HOSPITALIST
Payer: COMMERCIAL

## 2023-06-05 DIAGNOSIS — G93.89 BRAIN MASS: Primary | ICD-10-CM

## 2023-06-05 PROCEDURE — 97112 NEUROMUSCULAR REEDUCATION: CPT | Mod: GP | Performed by: PHYSICAL THERAPIST

## 2023-06-05 PROCEDURE — 97116 GAIT TRAINING THERAPY: CPT | Mod: GP | Performed by: PHYSICAL THERAPIST

## 2023-06-08 ENCOUNTER — THERAPY VISIT (OUTPATIENT)
Dept: PHYSICAL THERAPY | Facility: CLINIC | Age: 63
End: 2023-06-08
Attending: HOSPITALIST
Payer: COMMERCIAL

## 2023-06-08 ENCOUNTER — THERAPY VISIT (OUTPATIENT)
Dept: OCCUPATIONAL THERAPY | Facility: CLINIC | Age: 63
End: 2023-06-08
Attending: HOSPITALIST
Payer: COMMERCIAL

## 2023-06-08 DIAGNOSIS — G93.89 BRAIN MASS: Primary | ICD-10-CM

## 2023-06-08 PROCEDURE — 97535 SELF CARE MNGMENT TRAINING: CPT | Mod: GO

## 2023-06-08 PROCEDURE — 97116 GAIT TRAINING THERAPY: CPT | Mod: GP | Performed by: PHYSICAL THERAPIST

## 2023-06-08 PROCEDURE — 97112 NEUROMUSCULAR REEDUCATION: CPT | Mod: GP | Performed by: PHYSICAL THERAPIST

## 2023-06-12 ENCOUNTER — THERAPY VISIT (OUTPATIENT)
Dept: PHYSICAL THERAPY | Facility: CLINIC | Age: 63
End: 2023-06-12
Attending: HOSPITALIST
Payer: COMMERCIAL

## 2023-06-12 DIAGNOSIS — G93.89 BRAIN MASS: Primary | ICD-10-CM

## 2023-06-12 PROCEDURE — 97112 NEUROMUSCULAR REEDUCATION: CPT | Mod: GP | Performed by: PHYSICAL THERAPIST

## 2023-06-13 ENCOUNTER — ALLIED HEALTH/NURSE VISIT (OUTPATIENT)
Dept: NEUROSURGERY | Facility: CLINIC | Age: 63
End: 2023-06-13
Attending: HOSPITALIST
Payer: COMMERCIAL

## 2023-06-13 VITALS
TEMPERATURE: 98.8 F | HEART RATE: 63 BPM | SYSTOLIC BLOOD PRESSURE: 115 MMHG | OXYGEN SATURATION: 97 % | DIASTOLIC BLOOD PRESSURE: 78 MMHG

## 2023-06-13 DIAGNOSIS — Z98.890 S/P CRANIOTOMY: Primary | ICD-10-CM

## 2023-06-13 ASSESSMENT — PAIN SCALES - GENERAL: PAINLEVEL: NO PAIN (0)

## 2023-06-13 NOTE — PROGRESS NOTES
Post-op Nurse Visit:    Patient presents today s/p CRANIOTOMY, USING OPTICAL TRACKING SYSTEM, WITH NEOPLASM EXCISION STEALTH ASSISTED LEFT FRONTOPARIETAL CRANIOTOMY WITH RESCETION OF BRAIN TUMOR on 5/26 per the order of Dr. Monroy.      Pain  No pain currently.     Pain Relief Measures:  Oxycodone: not using  Tylenol: as needed, usually twice a day   Ice:   Weaned from steroid?: Yes  Weaned from anti-seizure med?: No    Neuro Assessment  Denies new onset of weakness, acute changes in the level of consciousness (increased confusion, memory loss, speech abnormalities), any change in hearing or vision, increased headaches, or new onset of seizures.    Patient reported after surgery she noticed right foot numbness and right arm weakness. These are still present, but seem to be improving per patient report. She is working with PT/OT which she finds very beneficial.     Patient noticed left eye black floaters one time since surgery (this happened last week). This has not happened again.     Incision   Incision inspected. Edges well-approximated. No redness, swelling, drainage, warmth, or fever noted. suture(s) removed without difficulty.     Patient had a few questions writer will review with Dr. Monroy and then update patient via [employer].     -Keppra is making patient drowsy and feel fatigued, how long does she need to take this?     -When is it safe to start driving? OT eval?     -she was told after surgery to have someone with her 24/7, for how long?     Staff message sent to Dr. Monroy for clarification on above questions.

## 2023-06-13 NOTE — NURSING NOTE
"Sarah Frost is a 63 year old female who presents for:  Chief Complaint   Patient presents with     Surgical Followup     2 week follow-up          Vitals:    Vitals:    06/13/23 1015   BP: 115/78   Pulse: 63   Temp: 98.8  F (37.1  C)   SpO2: 97%       BMI:  Estimated body mass index is 28.73 kg/m  as calculated from the following:    Height as of 5/27/23: 5' 11\" (1.803 m).    Weight as of 5/27/23: 206 lb (93.4 kg).    Pain Score:  No Pain (0)        Udayo Gladys      "

## 2023-06-13 NOTE — PROGRESS NOTES
Patients questions reviewed with Dr. Monroy.     Dr. Monroy recommends patient continue keppra for another 2 weeks for a total of 1 month. He also stated usually after a craniotomy patients can drive after 2 weeks. If patient is doing ok she does not need to have someone with her 24/7 anymore.     Updated patient via [employer] per her preference.

## 2023-06-13 NOTE — PATIENT INSTRUCTIONS
Instructions for Patient    Incision  Keep your incision clean and dry at all times.   You may get your incision wet in the shower. Use a gentle shampoo with no fragrance, such as baby shampoo, until incision is completely healed. Allow soap and water to run over the incision and gently pat it dry.   Look at your incision site every day. You  may need a mirror or family member to help you.   Watch for signs of infection  Redness, swelling, warmth, drainage (Green or yellow drainage (pus) from your incision or increased bloody drainage), and fever of 101 degrees or higher  Notify clinic [phone]  No submerging incision in water such as pools, hot tubs, or baths for at least 8 weeks and until the incision is healed  Do not apply lotions or ointments to incision  Avoid coloring your hair or permanent styling until cleared by your surgeon    Activity  Post operative activity limitations for 4 weeks after surgery: No bending forward, no lifting anything greater than 10 pounds (a gallon of milk weighs approximately 8 pounds)  Ok to start driving if it has been 2 weeks since surgery and you have not had seizure activity and you are not taking narcotics.  If you have had a seizure, you may not drive for at least 3 months according to [place] law.  No strenuous activity  We encourage short frequent walks. You may gradually increase the distance as tolerated.    Medications   Refills of pain medication:   Please call the neurosurgery clinic to request 2-3 days before you run out  Weaning from narcotic pain medications  When it is time, start weaning by extending the time between doses.   For example, if you're taking 2 tabs every 4 hours, spread it out to 2 tabs over 4.5, 5, 6 hours. At that point you can certainly cut down to 1 tab, then wean to an as needed basis until completely done with them.Refills: call our clinic 2-3 business days before you are out of medication. A nurse will call you back to obtain a pain  assessment.   Don't take more than 3000mg of Acetaminophen in 24 hours  Avoid Aspirin and NSAIDs (ex: ibuprofen/Advil) until given clearance (likely at the 6-week post-op appointment).  Encouraged icing for at least 3-4 times throughout the day for 20-30 minutes at a time. Avoid heat to the incision area.   Taking stool softeners regularly can reduce constipation commonly caused by narcotic pain medications.    Call the Clinic or go to the Emergency Room  Development of new pain/symptoms or worsening of symptoms  Incision infection (incisional redness, swelling, warmth, drainage, or fever)    Go to the Emergency Room   If sudden onset of Acute changes in the level of consciousness (increased confusion, memory loss, speech abnormalities), Any change in hearing or vision , increased headaches, or New onset of seizures  Chest pain   Trouble breathing     Post-Op Follow Up Appointments  2 week post- op for staple removal with a nurse. If you live far away, you may see your primary care doctor for a wound check at 2 weeks.   4-6 weeks post-op with Dr. Monroy  3 month post-op with Dr. Porfirio ANGULO [facility]  [facility] - [address] [address]  Telephone:  [phone]       Fax:  [phone]

## 2023-06-15 ENCOUNTER — THERAPY VISIT (OUTPATIENT)
Dept: PHYSICAL THERAPY | Facility: CLINIC | Age: 63
End: 2023-06-15
Attending: HOSPITALIST
Payer: COMMERCIAL

## 2023-06-15 DIAGNOSIS — G93.89 BRAIN MASS: Primary | ICD-10-CM

## 2023-06-15 PROCEDURE — 97116 GAIT TRAINING THERAPY: CPT | Mod: GP | Performed by: PHYSICAL THERAPIST

## 2023-06-16 ENCOUNTER — THERAPY VISIT (OUTPATIENT)
Dept: OCCUPATIONAL THERAPY | Facility: CLINIC | Age: 63
End: 2023-06-16
Attending: HOSPITALIST
Payer: COMMERCIAL

## 2023-06-16 DIAGNOSIS — G93.89 BRAIN MASS: Primary | ICD-10-CM

## 2023-06-16 PROCEDURE — 97535 SELF CARE MNGMENT TRAINING: CPT | Mod: GO | Performed by: OCCUPATIONAL THERAPIST

## 2023-06-16 PROCEDURE — 97129 THER IVNTJ 1ST 15 MIN: CPT | Mod: GO | Performed by: OCCUPATIONAL THERAPIST

## 2023-06-19 ENCOUNTER — THERAPY VISIT (OUTPATIENT)
Dept: PHYSICAL THERAPY | Facility: CLINIC | Age: 63
End: 2023-06-19
Attending: HOSPITALIST
Payer: COMMERCIAL

## 2023-06-19 ENCOUNTER — THERAPY VISIT (OUTPATIENT)
Dept: OCCUPATIONAL THERAPY | Facility: CLINIC | Age: 63
End: 2023-06-19
Attending: HOSPITALIST
Payer: COMMERCIAL

## 2023-06-19 DIAGNOSIS — G93.89 BRAIN MASS: Primary | ICD-10-CM

## 2023-06-19 PROCEDURE — 97112 NEUROMUSCULAR REEDUCATION: CPT | Mod: GP | Performed by: PHYSICAL THERAPIST

## 2023-06-19 PROCEDURE — 97535 SELF CARE MNGMENT TRAINING: CPT | Mod: GO

## 2023-06-22 ENCOUNTER — THERAPY VISIT (OUTPATIENT)
Dept: PHYSICAL THERAPY | Facility: CLINIC | Age: 63
End: 2023-06-22
Attending: HOSPITALIST
Payer: COMMERCIAL

## 2023-06-22 DIAGNOSIS — G93.89 BRAIN MASS: Primary | ICD-10-CM

## 2023-06-22 PROCEDURE — 97112 NEUROMUSCULAR REEDUCATION: CPT | Mod: GP | Performed by: PHYSICAL THERAPIST

## 2023-06-26 ENCOUNTER — THERAPY VISIT (OUTPATIENT)
Dept: OCCUPATIONAL THERAPY | Facility: CLINIC | Age: 63
End: 2023-06-26
Attending: HOSPITALIST
Payer: COMMERCIAL

## 2023-06-26 ENCOUNTER — THERAPY VISIT (OUTPATIENT)
Dept: PHYSICAL THERAPY | Facility: CLINIC | Age: 63
End: 2023-06-26
Attending: HOSPITALIST
Payer: COMMERCIAL

## 2023-06-26 DIAGNOSIS — G93.89 BRAIN MASS: Primary | ICD-10-CM

## 2023-06-26 PROCEDURE — 97112 NEUROMUSCULAR REEDUCATION: CPT | Mod: GP | Performed by: PHYSICAL THERAPIST

## 2023-06-26 PROCEDURE — 97535 SELF CARE MNGMENT TRAINING: CPT | Mod: GO

## 2023-06-29 ENCOUNTER — THERAPY VISIT (OUTPATIENT)
Dept: PHYSICAL THERAPY | Facility: CLINIC | Age: 63
End: 2023-06-29
Attending: HOSPITALIST
Payer: COMMERCIAL

## 2023-06-29 DIAGNOSIS — G93.89 BRAIN MASS: Primary | ICD-10-CM

## 2023-06-29 PROCEDURE — 97116 GAIT TRAINING THERAPY: CPT | Mod: GP | Performed by: PHYSICAL THERAPIST

## 2023-07-03 ENCOUNTER — THERAPY VISIT (OUTPATIENT)
Dept: OCCUPATIONAL THERAPY | Facility: CLINIC | Age: 63
End: 2023-07-03
Attending: HOSPITALIST
Payer: COMMERCIAL

## 2023-07-03 ENCOUNTER — THERAPY VISIT (OUTPATIENT)
Dept: PHYSICAL THERAPY | Facility: CLINIC | Age: 63
End: 2023-07-03
Attending: HOSPITALIST
Payer: COMMERCIAL

## 2023-07-03 DIAGNOSIS — G93.89 BRAIN MASS: Primary | ICD-10-CM

## 2023-07-03 PROCEDURE — 97112 NEUROMUSCULAR REEDUCATION: CPT | Mod: GP | Performed by: PHYSICAL THERAPIST

## 2023-07-03 PROCEDURE — 97535 SELF CARE MNGMENT TRAINING: CPT | Mod: GO

## 2023-07-06 ENCOUNTER — THERAPY VISIT (OUTPATIENT)
Dept: PHYSICAL THERAPY | Facility: CLINIC | Age: 63
End: 2023-07-06
Attending: HOSPITALIST
Payer: COMMERCIAL

## 2023-07-06 DIAGNOSIS — G93.89 BRAIN MASS: Primary | ICD-10-CM

## 2023-07-06 PROCEDURE — 97112 NEUROMUSCULAR REEDUCATION: CPT | Mod: GP | Performed by: PHYSICAL THERAPIST

## 2023-07-10 ENCOUNTER — THERAPY VISIT (OUTPATIENT)
Dept: PHYSICAL THERAPY | Facility: CLINIC | Age: 63
End: 2023-07-10
Attending: HOSPITALIST
Payer: COMMERCIAL

## 2023-07-10 ENCOUNTER — THERAPY VISIT (OUTPATIENT)
Dept: OCCUPATIONAL THERAPY | Facility: CLINIC | Age: 63
End: 2023-07-10
Attending: HOSPITALIST
Payer: COMMERCIAL

## 2023-07-10 DIAGNOSIS — G93.89 BRAIN MASS: Primary | ICD-10-CM

## 2023-07-10 PROCEDURE — 97112 NEUROMUSCULAR REEDUCATION: CPT | Mod: GP | Performed by: PHYSICAL THERAPIST

## 2023-07-10 PROCEDURE — 97535 SELF CARE MNGMENT TRAINING: CPT | Mod: GO

## 2023-07-11 ENCOUNTER — OFFICE VISIT (OUTPATIENT)
Dept: NEUROSURGERY | Facility: CLINIC | Age: 63
End: 2023-07-11
Payer: COMMERCIAL

## 2023-07-11 ENCOUNTER — TELEPHONE (OUTPATIENT)
Dept: CARDIOLOGY | Facility: CLINIC | Age: 63
End: 2023-07-11

## 2023-07-11 VITALS — SYSTOLIC BLOOD PRESSURE: 128 MMHG | HEART RATE: 67 BPM | DIASTOLIC BLOOD PRESSURE: 81 MMHG | OXYGEN SATURATION: 99 %

## 2023-07-11 DIAGNOSIS — Q21.0 SEPTAL DEFECT, VENTRICULAR: ICD-10-CM

## 2023-07-11 DIAGNOSIS — Z98.890 S/P CRANIOTOMY: Primary | ICD-10-CM

## 2023-07-11 DIAGNOSIS — Z00.00 HEALTHCARE MAINTENANCE: ICD-10-CM

## 2023-07-11 DIAGNOSIS — R56.9 SEIZURES (H): ICD-10-CM

## 2023-07-11 PROCEDURE — 99024 POSTOP FOLLOW-UP VISIT: CPT | Performed by: NEUROLOGICAL SURGERY

## 2023-07-11 NOTE — TELEPHONE ENCOUNTER
[facility] Call Center    Phone Message    May a detailed message be left on voicemail: yes     Reason for Call: Other: Sarah called to schedule her congenital appt with Dr. Patterson in [place]. Please reach out to Sarah to schedule. Thank you!     Action Taken: Other: Cardiology    Travel Screening: Not Applicable     Thank you!  [facility]

## 2023-07-11 NOTE — PROGRESS NOTES
NEUROSURGERY FOLLOWUP  NOTE    Sarah Frost comes today in f/u 6 weeks s/p 5/26/2023: Left parietal craniotomy for resection of mass.  Final pathology:A-B. 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     She is doing well following the surgery.  She reports significant improvement in her right-sided weakness following the surgery.  She had a single episode of visual flashes on June 9, 2023.  No similar episodes following that.  She is off Keppra for last 2 weeks and feels better in terms of her drowsiness and attentiveness and ability to interact.  She is off steroids.  She reports no new neurological symptoms today.  He also reports that the sensation in her right feet is gradually getting better since the surgery.    Her incision has healed well.      PHYSICAL EXAM:   Constitutional: /81   Pulse 67   SpO2 99%      Mental Status: A & O in no acute distress.  Affect is appropriate.  Speech is fluent.  Recent and remote memory are intact.  Attention span and concentration are normal.     Motor:  Normal bulk and tone all muscle groups of upper and lower extremities.     Sensory: Sensation intact bilaterally to light touch.      Coordination:   Heel/toe/ gait intact.  Romberg's intact, tandem gait impaired     reflexes; supinator, biceps, triceps, knee/ ankle jerk intact.  No hoffmans/   no babinski/ clonus.    Incision healed well    IMAGING:   No new images today        CONSULTATION ASSESSMENT AND PLAN:    Sarah Frost comes today in f/u 6 weeks s/p 5/26/2023: Left parietal craniotomy for resection of mass.  Final pathology:A-B. 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    She is doing well following the surgery.  Her right-sided weakness is improved significantly since the surgery.  She still working with occupational  therapy and physical therapy as an outpatient with few sessions remaining.  She is able to ambulate without support and the able to carry out activities of daily living by herself without support.    She had 1 episode of visual flashes since the surgery with no similar episodes.  She is off Keppra for last 2 weeks.  She never had episode of generalized tonic-clonic seizures or loss of consciousness.    On clinical examination she is neurologically intact except for mild tandem gait issues.  She is 5 x 5 in her right upper and lower extremity in terms of motor strength.    I will follow her in another 6 weeks with follow-up MRI brain with and without contrast that will be 3 months following the surgery.  I will also refer her to the adult congenital cardiologist for evaluation of her ventricular septal defect and congenital valve abnormalities.  I will also refer her to the neurologist for evaluation of questionable seizure activity prior to presentation.  We will also set her up with a primary care for further follow-up.    Patient and her  agreed with the plan.  All questions were answered and patient sounded understanding.  They can contact us if there are any further questions concerns or worsening neurological deficits.  I will follow her with MRI brain with and without contrast Stealth protocol in another 6 weeks      I spent more than 20 minutes in this apt, examining the pt, reviewing the scans, reviewing notes from chart, discussing treatment options with risks and benefits and coordinating care.     Jude Monroy MD      CC:     No Ref-Primary, Physician  No address on file

## 2023-07-11 NOTE — LETTER
7/11/2023         RE: Sarah Frost  [address] [facility] [address]        Dear Colleague,    Thank you for referring your patient, Sarah Frost, to the [facility]. Please see a copy of my visit note below.    NEUROSURGERY FOLLOWUP  NOTE    Sarah Frost comes today in f/u 6 weeks s/p 5/26/2023: Left parietal craniotomy for resection of mass.  Final pathology:A-B. 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     She is doing well following the surgery.  She reports significant improvement in her right-sided weakness following the surgery.  She had a single episode of visual flashes on June 9, 2023.  No similar episodes following that.  She is off Keppra for last 2 weeks and feels better in terms of her drowsiness and attentiveness and ability to interact.  She is off steroids.  She reports no new neurological symptoms today.  He also reports that the sensation in her right feet is gradually getting better since the surgery.    Her incision has healed well.      PHYSICAL EXAM:   Constitutional: /81   Pulse 67   SpO2 99%      Mental Status: A & O in no acute distress.  Affect is appropriate.  Speech is fluent.  Recent and remote memory are intact.  Attention span and concentration are normal.     Motor:  Normal bulk and tone all muscle groups of upper and lower extremities.     Sensory: Sensation intact bilaterally to light touch.      Coordination:   Heel/toe/ gait intact.  Romberg's intact, tandem gait impaired     reflexes; supinator, biceps, triceps, knee/ ankle jerk intact.  No hoffmans/   no babinski/ clonus.    Incision healed well    IMAGING:   No new images today        CONSULTATION ASSESSMENT AND PLAN:    Sarah Frost comes today in f/u 6 weeks s/p 5/26/2023: Left parietal craniotomy for resection of mass.  Final pathology:A-B. Brain, left parietal  tumor, biopsy and resection:     - Meningioma, angiomatous and microcystic histologic subtypes (CNS WHO grade 1).    She is doing well following the surgery.  Her right-sided weakness is improved significantly since the surgery.  She still working with occupational therapy and physical therapy as an outpatient with few sessions remaining.  She is able to ambulate without support and the able to carry out activities of daily living by herself without support.    She had 1 episode of visual flashes since the surgery with no similar episodes.  She is off Keppra for last 2 weeks.  She never had episode of generalized tonic-clonic seizures or loss of consciousness.    On clinical examination she is neurologically intact except for mild tandem gait issues.  She is 5 x 5 in her right upper and lower extremity in terms of motor strength.    I will follow her in another 6 weeks with follow-up MRI brain with and without contrast that will be 3 months following the surgery.  I will also refer her to the adult congenital cardiologist for evaluation of her ventricular septal defect and congenital valve abnormalities.  I will also refer her to the neurologist for evaluation of questionable seizure activity prior to presentation.  We will also set her up with a primary care for further follow-up.    Patient and her  agreed with the plan.  All questions were answered and patient sounded understanding.  They can contact us if there are any further questions concerns or worsening neurological deficits.  I will follow her with MRI brain with and without contrast Stealth protocol in another 6 weeks      I spent more than 20 minutes in this apt, examining the pt, reviewing the scans, reviewing notes from chart, discussing treatment options with risks and benefits and coordinating care.     Jude Monroy MD      CC:     No Ref-Primary, Physician  No address on file          Again, thank you for allowing me to participate in the  care of your patient.        Sincerely,        Jude Monroy MD

## 2023-07-11 NOTE — NURSING NOTE
"Sarah Frost is a 63 year old female who presents for:  Chief Complaint   Patient presents with     Surgical Followup     Craniotomy with neoplasm excision stealth assisted left frontoparietal craniotomy with resection of brain tumor- 6 wk follow up        Initial Vitals:  /81   Pulse 67   SpO2 99%  Estimated body mass index is 28.73 kg/m  as calculated from the following:    Height as of 5/27/23: 5' 11\" (1.803 m).    Weight as of 5/27/23: 206 lb (93.4 kg).. There is no height or weight on file to calculate BSA. BP completed using cuff size: regular  Data Unavailable    Nursing Comments:         Alva Garcia    "

## 2023-07-11 NOTE — PATIENT INSTRUCTIONS
Patient Next Steps:      Order placed for imaging. MRI Brain in 3 months. You can schedule at our  today([place] location only), or Central Scheduling will call you to make the appointment. If you do not hear from them within 1-2 business days you can call the numbers below.   [phone] (south)  [phone] (north)  [phone] ([facility])  You can call [facility] (previously known as [facility]) to schedule your imaging at [phone]    Dr Monroy would like to see you back in the clinic for follow up in 3 months. Please call the number below to schedule.     Referral has been placed for Cardiology, Neurology, and Primary Care.     Please call us if you have any further questions or concerns.    [facility]   Phone: [phone]  Fax: [phone]

## 2023-07-12 NOTE — TELEPHONE ENCOUNTER
Date: 7/12/2023    Time of Call: 8:33 AM     Diagnosis: VSD     [ TORB ] Ordering provider: Dr. Patterson  Order: appt only     Order received by: LOLLY Chapin     Follow-up/additional notes: Referral from neurosurgery. Patient has a hx of a small VSD, she recently had an echo done, no need to repeat at this time. Appears she was last seen by cardiology in 2008.  Dari Mistry RN on 7/12/2023 at 8:36 AM

## 2023-07-13 ENCOUNTER — THERAPY VISIT (OUTPATIENT)
Dept: PHYSICAL THERAPY | Facility: CLINIC | Age: 63
End: 2023-07-13
Attending: HOSPITALIST
Payer: COMMERCIAL

## 2023-07-13 DIAGNOSIS — G93.89 BRAIN MASS: Primary | ICD-10-CM

## 2023-07-13 PROCEDURE — 97112 NEUROMUSCULAR REEDUCATION: CPT | Mod: GP | Performed by: PHYSICAL THERAPIST

## 2023-07-17 ENCOUNTER — THERAPY VISIT (OUTPATIENT)
Dept: PHYSICAL THERAPY | Facility: CLINIC | Age: 63
End: 2023-07-17
Attending: HOSPITALIST
Payer: COMMERCIAL

## 2023-07-17 DIAGNOSIS — G93.89 BRAIN MASS: Primary | ICD-10-CM

## 2023-07-17 PROCEDURE — 97110 THERAPEUTIC EXERCISES: CPT | Mod: GP | Performed by: PHYSICAL THERAPIST

## 2023-07-20 ENCOUNTER — THERAPY VISIT (OUTPATIENT)
Dept: PHYSICAL THERAPY | Facility: CLINIC | Age: 63
End: 2023-07-20
Attending: HOSPITALIST
Payer: COMMERCIAL

## 2023-07-20 DIAGNOSIS — G93.89 BRAIN MASS: Primary | ICD-10-CM

## 2023-07-20 PROCEDURE — 97112 NEUROMUSCULAR REEDUCATION: CPT | Mod: GP | Performed by: PHYSICAL THERAPIST

## 2023-07-24 ENCOUNTER — THERAPY VISIT (OUTPATIENT)
Dept: PHYSICAL THERAPY | Facility: CLINIC | Age: 63
End: 2023-07-24
Attending: HOSPITALIST
Payer: COMMERCIAL

## 2023-07-24 ENCOUNTER — THERAPY VISIT (OUTPATIENT)
Dept: OCCUPATIONAL THERAPY | Facility: CLINIC | Age: 63
End: 2023-07-24
Attending: HOSPITALIST
Payer: COMMERCIAL

## 2023-07-24 DIAGNOSIS — G93.89 BRAIN MASS: Primary | ICD-10-CM

## 2023-07-24 PROCEDURE — 97129 THER IVNTJ 1ST 15 MIN: CPT | Mod: GO

## 2023-07-24 PROCEDURE — 97112 NEUROMUSCULAR REEDUCATION: CPT | Mod: GP | Performed by: PHYSICAL THERAPIST

## 2023-07-24 PROCEDURE — 97750 PHYSICAL PERFORMANCE TEST: CPT | Mod: GP | Performed by: PHYSICAL THERAPIST

## 2023-07-24 PROCEDURE — 97130 THER IVNTJ EA ADDL 15 MIN: CPT | Mod: GO

## 2023-07-25 NOTE — PROGRESS NOTES
DISCHARGE  Reason for Discharge: See note below for details    Equipment Issued: none    Discharge Plan: Patient to continue home program.    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   Rationale to maximize safety and independence within the community   Goal Progress 7/24/23: GOAL MET.  comfortable pace: 1.49 m/s without AD; fast pace: 1.94 m/s  (both considered WNL and without inc risk for falls)// at eval, 6/1/23: 1.12 m/s with 2ww: maintains slow, mildly uncoordinated but steady gait. At home, using 2ww at all times and wearing gait belt. Prior to sxs onset of brain tumor - walking w/o AD. Wants to walk her dog again.   Target Date 07/27/23   Date Met 07/24/23   PT Goal 2   Goal Identifier Gait stability/FGA   Goal Description Pt to score >/= 27 on FGA to demonstrate significantly improved gait stability, considered WNL for age-matched norms and no longer considered inc risk for falls.   Rationale to maximize safety and independence within the community   Goal Progress 7/24/23: SIGNIFICANT  PROGRESS. 25/30 // at [facility], 6/1/23: (MAXIMUM SCORE 30): 10 (< 22 = inc fall risk)   Target Date 07/27/23   PT Goal 3   Goal Identifier Balance:   Goal Description Pt to maintain romberg foam EC x30 sec to demonstrate good static stability with dec visual input on uneven surfaces for showering and navigaiting in dim lighting with improved safety.   Rationale to maximize safety and independence with performance of ADLs and functional tasks   Goal Progress 7/24/23: SIGNIFICANT PROGRESS. romberg EC on firm ground x30 sec without inc sway, romberg EC on cushion x3-7 sec // at [facility], 6/1/23: Reports moments of instability 'legs cross' but can self-correct without falls/near falls. Reports using walker in shower - able to close eyes and maintain balance. Cautious around dog to avoiding tripping.  Reports she has had difficulty with her balance with EC for a long time. 6/5/23: Romberg eyes closed on firm surface: 17 sec   Target Date 07/27/23   PT Goal 4   Goal Identifier Stairs:   Goal Description Pt to demonstrate and report ability to safely navigate 64 steps with railing maintainining stability in order to access her pontoon and report return to navigaiting stairs for exercise.   Rationale to maximize safety and independence within the community   Goal Progress 7/24/23: GOAL MET. reports she has navigaited the stairs to her dock many times wihout issue. // at [facility], 6/1/23: with B UE support from railing and SPC, maintains reciprocal gait, good foot clearance, controled with attn[place] with a railing to a dock/pontoon launch - used to use these stairs for exercise.   Target Date 07/27/23   Date Met 07/24/23   PT Goal 5   Goal Identifier Exercise:   Goal Description Once precautions towards exertion are lifted, pt to establish appropraite high intensity walking program towards improved endurance and gait stability, reporting consistency and understanding to continue following d/c from PT.    Goal Progress 7/24/23: GOAL MET. Pt has demonstrated consistancy with developing HEP, motivated to continue with exs as provided. //  at eval, 6/1/23: Currently, none. Previously was walking x45-60 minutes with dog every day.  Would also navigate outdoor stairs for cardio x30-45 minutes. Owns elliptical, TM and rowing machine, thearpy ball -used on poor-weather days.   Target Date 07/27/23   Date Met 07/24/23   Subjective Report   Subjective Report Busy over the weekend with social events - lots of walking without flairing foot pain or causing instability. Bottom of her L foot bothering her at times but is getting better since last week and feels much better with new arch supports in shoes. She manages with stretching, and massage (PT ed for using tennis ball - pt interested in trying this). Feels she has progressed a lot since starting therapy and feels ready for d/c. It has been 8 wks since surgery. She feels ready to focus on inc endurance (ie longer intervals on treadmill). She feels much steadier on stairs, a better sense of where her foot is now. However she continues to feel limited in dark areas (has felt this way for yrs) although she feels she is getting better with EC exs at home. For example she was at a backyard [employer] and it was dark outside  and felt like she needed hand support (PT ed: on steping on cushion  for uneven surface in dimly lit room and EC standing on cushion, cont with vestibular exs as well for dec reliance on vision for balance - she is eager to continue with these exs and wants to try them now in a dimly lit room.) She reports she gets anxious about being unsteady around others (PT ed to practice walking in yard/outside at dusk (I)/with spouse for greater confidence around others)   Objective Measures   Objective Measures Objective Measure 1;Objective Measure 2;Objective Measure 3;Objective Measure 4   Objective Measure 1   Objective Measure Gait speed   Details comfortable pace: 1.49  "m/s without AD; fast pace: 1.94 m/s   Objective Measure 2   Objective Measure FGA   Details 25/30; difficulty with heel to toe walk and EC walk   Objective Measure 3   Objective Measure Romberg foam EC   Details 5-7 sec intervals with inc trunk sway feet 2-3\" apart, romberg EC on firm ground 30 sec   Objective Measure 4   Objective Measure Stairs   Details reciporcal without UE support maintains good speed and foot clearance   Neuromuscular Re-education   Neuromuscular re-ed of mvmt, balance, coord, kinesthetic sense, posture, proprioception minutes (05722) 8   Neuro Re-ed 1 - Details reivew of exs to continue in dim lighting, priorties for coordination/balance challenges and dec visual input   Skilled Intervention instructions, demo, cues, education   Patient Response/Progress eager to participate in exs with dim lighting   Eval/Assessments   Assessments Physical Performance Test/Measures   Physical Performance Test/measures   Physical Performance Test/Measurement, Minutes (15246) 30   Physical Performance Test/Measurement Details gait speed comfortable/fast, FGA, stairs, romberg EO/EC on firm ground and cushion   Skilled Intervention instructions and feedback on reassessment relative to baseline and goals with PT report card provided for referance   Patient Response/Progress see objectives   Progress see goals   Education   Education Comments CONTINUE FROM 7/20/23: PROGRESSIONS for the VOR. 1) Youtube: Alonzo [email or web address]  Go to Playlists and select what you prefer. Put the target on the TV screen. If it feels too disorienting to stand close to a larger screen you can back up a little bit. 2) walking while holding target - you may stagger, you may need to do slower head movements and walking and thats okay // CONTINUE from 7/13/23: HEP: Head-eye coordination  VOR  reflex exercise. 15 minutes total every day.  set at timer on phone 1-minute intervals x5 reps 3x/dayHead turns and head nods Turn your head as " quickly as you can (shaking head  no ) while keeping the letter from jumping/or blurring. Break between reps as needed to recover between the intervals. Do this exercise: Standing With target on the wall:  arms distance download a free metronome minerva.Set a metronome: 100 bpm starting. Increase by 5-10 bpm each session IF you are able to keep the letter from doubling/jumping otherwise resume the previous speed. The speed of your head movements will progress gradually with time.Goal over time (can take 8 weeks) is to get the metronome speed to 240 bpm with keeping the letter in focus.   This will help with your balance in the dark7/13/23 progressions: see if you can read a letter 1 line smaller than the one you were just looking at (on snellen chart) keeping it in focus and if not go back to the larger letter.standing on a cushion not the floor feet comfortably spaced apart - progress to more narrow stance (feet together as able)stand on floor with heels to toe touching but offset a little bit (each foot behind)   Plan   Home program CONTINUE from 7/17/23 Outdoor Pilates Workout [employer] - 20 min, seated R ankle PF stretch // CONTINUE from 7/6/23: Youtube: 30 Minute Step Workout for Beginners and Seniors  Pyramid HIIT //  CONTINUE 6/29/23: ankle wts, no AD or wall support, HIGT x40 minutes (incudling new TM exs below ): 1) Forward walking (fast)  2) Sideways    3) Backwards    4) Walking on toes 5) Walking on heels 6) Heel to toe walking  7) Marching - 3 second hold  8) Stairs   9) dim the light in the room (not totally dark)  10) Skipping  11) Change directions: forwards walking to backwards, quick turns  12) Braiding: alternate crossing one foot in front and one foot behind other foot with side stepping 13) sidways walking with jumping jack motion UEs  // CONTINUE from 6/15/23: TREADMILL exs (as part of HIGT):  1) Comfortable walking speed: 2.6 mph 2) Increase slightly to 3.0 mph - no incline - alternating 1 hand off  and then other hand off for 1 hand support walking, progress to incorporate head turns left and right - every 5-10 steps change hands and head position  3) Increase speed on flat surfaces - goal 3.9 mph = normal FAST walking speed, hold on both hands (in clinic 3.5 mph).  4) Increase the incline. 3.0 mph - about half   of the full elevation of the treadmill (6) then full elevation (10). 16 minutes. 2 minutes each, alternating between the 4 options 2x each. // 6/12/23: CONTINUE BALANCE exs: 1) step up and down from the cushion alternating feet 2) side stepping on and off the right and left sides of the cushion with both feet 3) Step on the cushion 1 foot - other foot hovers over the cushion and touch cushion with toes for added balance when loosing balance aim for 1-2 attempts of 1-5 seconds hold. Repeat by stepping off the other side of the cushion and step up with the other leg. 4) standing on cushion - feet apart eyes closed - aim for 30 seconds (able to progress from 1 sec to 23 seconds) 5) marching on cushion - looking over each shoulder up and down 6) forwards step up with 1 foot on the cushion opposite foot hovers over the cushion (think about it tapping to a next stair up that isn't there) to catch your balance step back behind the cushion and repeat each leg.   Updates to plan of care She feels excellent from participating in physical therapy and feels like she has HEP she needs to continue with self progressing going forward.  Ready for discharge.   Total Session Time   Timed Code Treatment Minutes 38   Total Treatment Time (sum of timed and untimed services) 38

## 2023-07-31 ENCOUNTER — THERAPY VISIT (OUTPATIENT)
Dept: OCCUPATIONAL THERAPY | Facility: CLINIC | Age: 63
End: 2023-07-31
Attending: HOSPITALIST
Payer: COMMERCIAL

## 2023-07-31 DIAGNOSIS — G93.89 BRAIN MASS: Primary | ICD-10-CM

## 2023-07-31 PROCEDURE — 97130 THER IVNTJ EA ADDL 15 MIN: CPT | Mod: GO

## 2023-07-31 PROCEDURE — 97129 THER IVNTJ 1ST 15 MIN: CPT | Mod: GO

## 2023-07-31 NOTE — PROGRESS NOTES
OUTPATIENT OCCUPATIONAL THERAPY DISCHARGE NOTE     07/31/23 0500   Appointment Info   Treating Provider Zoe Perez OTR/L   Visits Used 8   Medical Diagnosis Brain mass   OT Tx Diagnosis Decreased safety and IND with ADLs/IADLs   Precautions/Limitations Fall, seizure, postsurgical weight-lifting (<8-10#)   Progress Note/Certification   Onset of Illness/Injury or Date of Surgery 05/23/23  (Admitted 5/23/23, date of resection 5/26/23)   Therapy Frequency 1x/week   Predicted Duration 12 weeks (starting with 8 weeks scheduled)   Progress Note Due Date 08/24/23   Goals   OT Goals 1;2;3;4;5   OT Goal 1   Goal Identifier Energy Conservation, Work Simplification   Goal Description Pt will demonstrate 3 energy conservation strategies (e.g. pacing, planning, prioritizing, sleep hygiene, etc.) to facilitate fatigue management with ADL/IADL tasks (e.g. laundry, meal preparation, walking/exercise).   Goal Progress Sarah reports she feels her energy is doing a lot better. She is applying energy conservation strategies learned in session such as pacing, prioritizing, and planning. Sarah has demonstrated good ability to self-monitor when to stop activities and how much she is capable of doing. GOAL MET.    Target Date 08/24/23   OT Goal 2   Goal Identifier RUE Fine Motor Coordination   Goal Description Pt will demonstrate improved FMC for IND with ADLs/IADLs (e.g. clothing fasteners, pinching packages, using tools, writing/utensil use, typing) as measured by 25% improvement with R hand on 9 hole peg test.   Goal Progress Reports no fine motor concerns. Patient reporting able to complete buttons and other FMC activities w/ no difficulty.Reassessed fine motor on 7/24, patient scoring within 1 SD of the mean. See objective measures for more details. GOAL MET.    Target Date 08/24/23   OT Goal 3   Goal Identifier UE HEP   Goal Description Pt will demonstrate independent completion of 1-3 activity HEP for UE coordination/endurance  to promote independence with ADLs/IADLs (e.g. retrieving object from cabinet or refrigerator, hanging laundry, handwriting, meal preparation).   Goal Progress With physical therapy, she is doing strength training such as sitting on bosu ball with dumb bells. Reports doing pilates in PT made her feel exhausted but is okay with waiting on this or starting with less time. Reports doing hand strengthening at home as needed. GOAL MET.    Target Date 08/24/23   OT Goal 4   Goal Identifier Memory/Cognition   Goal Description Patient to verbalize understanding of cognitive screen results and identify 3 strategies to increase patient's safety and independence in the home and community setting.   Goal Progress Sarah reports that she does not problems with word finding, but when she gets tired this is more challenging. Reports that the last time she struggled with word finding was over a week ago! Reports using more metamemory strategies such as using post it notes, using a calendar, leaving objects in one place, etc. Sarah plans to continue luminosity and other cognitive activities at home to further progress her cognition. GOAL MET.    Target Date 08/24/23   OT Goal 5   Goal Identifier IADLs/Community Mobility   Goal Description Pt will demonstrate visual and divided attention WFL for safe functional and community mobility tasks (navigating new environments, driving, grocery shopping) by scoring WFLs on 3/3 IADL tasks (Dynavision, Scan course, SDMT, trail-making, BiVABA scan sheets, etc.).   Goal Progress Goal met 7/3, patient scoring WFL for Dynavision, Scan Course, SDMT, and BiVABA. See objective measures for more details. Patient reports driving in bouts of 1 hour and a half at time to [place] with her . Reports no further concerns. GOAL MET.    Target Date 08/24/23   Subjective Report   Subjective Report Feels good that she can do unexpected things now. Drove to [place] and did most of the driving; only  problem was construction. Reports it was daining seeing tall cones in peripheral. Had to have her  take over somewhat. Played Volt[employer] yesterday and did really well with that. Feels good about this being her last appointment.   Objective Measures   Objective Measures Objective Measure 1;Objective Measure 2;Objective Measure 3;Objective Measure 4;Objective Measure 5;Objective Measure 6   Objective Measure 1   Objective Measure MoCA   Details Completed [place] Cognitive Assessment (MoCA) which was designed as a rapid screening instrument for mild cognitive dysfunction. It assesses different cognitive domains: attention and concentration, executive functions, memory, language, visuoconstructional skills, conceptual thinking, calculations, and orientation. The total possible score is 30 points; a score of 26 or above is considered within normal range. Pt scored 22/30 with deficits observed in visuospatial/executive functioning (cube), attention (serial 7 subtraction), immediate memory (able to immediately recall 4/5 words in first trial, 5/5 in second), language fluency (1/2 sentence repetition), abstraction, and delayed recall (2/5 words without cueing, 1/5 words with category cues, and 2/5 requiring multiple choice cueing). Pt s 22/30 score reflects a moderate cognitive impairment.   Objective Measure 2   Objective Measure Dynavision   Details As of 7/3/2023 - Mode A: 57 hits; WNLs is 52+). Mode B: 52 hits (average: 42 hits). Top left: 21/ 23, Bottom Left: 5/7, Top right: 16/20, bottom left: 12/21. Mode B divided attention: 54/71 with  10/10 distractions (WNL: 35+)   Objective Measure 3   Objective Measure Right Foot Tap Measure   Details As of 7/3/23 -  pt completes in 4.3 sec (WNL: <6 seconds)   Objective Measure 4   Objective Measure BiVABA Visual Scan Course   Details As of 7/3/23 - On 60 foot scan course with targets high/medium/low, patient gets 8/10 L, 8/10 R in 32.0 sec and 10/10 L, 10/10 R in 42.0 sec  this date. WNL is consistent scores of >19/20, completing course in 35-50 seconds.   Objective Measure 5   Objective Measure Symbol Digit Modalities Test   Details As of 7/3/2023 - Patient completed 43 digits in 90 seconds. WNL for age = 43.   Objective Measure 6   Objective Measure 9 hole peg test   Details As of 7/24/2023, R hand: 21 seconds. Left hand: 21.5 seconds. Both within 1 SD.     Reason for Discharge: Patient has met all goals and has no further occupational therapy needs. Sarah is applying energy conservation strategies to daily activities and reports her energy is much better. In addition, fine motor and strength have returned to where they were at baseline per patient report and testing. Sarah is driving long distances and has no further concerns about driving. Plan to continue cognition activities at home and return to occupational therapy is new concerns arise.     Discharge Plan: Patient to continue home program consisting of hand strengthening and cognitive activities (I.e luminosity, math salamander, cross words, etc) to further progress hand strength and executive functioning for daily activities.     Referring Provider:  Mayte Smith    Thank you for referring Sarah to outpatient pediatric therapy at [facility]. Please contact me with any questions or concerns at my email or phone number listed below.    -----------------------------------  Zoe Perez OTR/L  Occupational Therapist     [facility] [address] [address]   [email or web address]   Phone: [phone]  Fax: [phone]  Employed by [facility]

## 2023-08-08 ENCOUNTER — HOSPITAL ENCOUNTER (OUTPATIENT)
Dept: MRI IMAGING | Facility: CLINIC | Age: 63
Discharge: HOME | End: 2023-08-08
Attending: NEUROLOGICAL SURGERY | Admitting: NEUROLOGICAL SURGERY
Payer: COMMERCIAL

## 2023-08-08 DIAGNOSIS — Z98.890 S/P CRANIOTOMY: ICD-10-CM

## 2023-08-08 PROCEDURE — 70552 MRI BRAIN STEM W/DYE: CPT

## 2023-08-08 PROCEDURE — A9585 GADOBUTROL INJECTION: HCPCS | Performed by: NEUROLOGICAL SURGERY

## 2023-08-08 PROCEDURE — 255N000002 HC RX 255 OP 636: Performed by: NEUROLOGICAL SURGERY

## 2023-08-08 RX ORDER — GADOBUTROL 604.72 MG/ML
15 INJECTION INTRAVENOUS ONCE
Status: COMPLETED | OUTPATIENT
Start: 2023-08-08 | End: 2023-08-08

## 2023-08-08 RX ADMIN — GADOBUTROL 15 ML: 604.72 INJECTION INTRAVENOUS at 17:38

## 2023-08-15 ENCOUNTER — LAB (OUTPATIENT)
Dept: LAB | Facility: CLINIC | Age: 63
End: 2023-08-15
Payer: COMMERCIAL

## 2023-08-15 ENCOUNTER — OFFICE VISIT (OUTPATIENT)
Dept: NEUROSURGERY | Facility: CLINIC | Age: 63
End: 2023-08-15
Payer: COMMERCIAL

## 2023-08-15 VITALS — SYSTOLIC BLOOD PRESSURE: 128 MMHG | DIASTOLIC BLOOD PRESSURE: 85 MMHG | OXYGEN SATURATION: 98 % | HEART RATE: 69 BPM

## 2023-08-15 DIAGNOSIS — Z98.890 S/P CRANIOTOMY: ICD-10-CM

## 2023-08-15 DIAGNOSIS — D35.2 BENIGN NEOPLASM OF PITUITARY GLAND AND CRANIOPHARYNGEAL DUCT (POUCH) (H): Primary | ICD-10-CM

## 2023-08-15 DIAGNOSIS — D35.3 BENIGN NEOPLASM OF PITUITARY GLAND AND CRANIOPHARYNGEAL DUCT (POUCH) (H): Primary | ICD-10-CM

## 2023-08-15 DIAGNOSIS — D35.3 BENIGN NEOPLASM OF PITUITARY GLAND AND CRANIOPHARYNGEAL DUCT (POUCH) (H): ICD-10-CM

## 2023-08-15 DIAGNOSIS — D35.2 BENIGN NEOPLASM OF PITUITARY GLAND AND CRANIOPHARYNGEAL DUCT (POUCH) (H): ICD-10-CM

## 2023-08-15 PROCEDURE — 83003 ASSAY GROWTH HORMONE (HGH): CPT

## 2023-08-15 PROCEDURE — 83001 ASSAY OF GONADOTROPIN (FSH): CPT

## 2023-08-15 PROCEDURE — 84443 ASSAY THYROID STIM HORMONE: CPT

## 2023-08-15 PROCEDURE — 82024 ASSAY OF ACTH: CPT

## 2023-08-15 PROCEDURE — 80048 BASIC METABOLIC PNL TOTAL CA: CPT

## 2023-08-15 PROCEDURE — 84439 ASSAY OF FREE THYROXINE: CPT

## 2023-08-15 PROCEDURE — 82533 TOTAL CORTISOL: CPT

## 2023-08-15 PROCEDURE — 84146 ASSAY OF PROLACTIN: CPT

## 2023-08-15 PROCEDURE — 99000 SPECIMEN HANDLING OFFICE-LAB: CPT

## 2023-08-15 PROCEDURE — 99024 POSTOP FOLLOW-UP VISIT: CPT | Performed by: NEUROLOGICAL SURGERY

## 2023-08-15 PROCEDURE — 36415 COLL VENOUS BLD VENIPUNCTURE: CPT

## 2023-08-15 PROCEDURE — 83002 ASSAY OF GONADOTROPIN (LH): CPT

## 2023-08-15 PROCEDURE — 82397 CHEMILUMINESCENT ASSAY: CPT | Mod: 90

## 2023-08-15 PROCEDURE — 84403 ASSAY OF TOTAL TESTOSTERONE: CPT

## 2023-08-15 PROCEDURE — 84305 ASSAY OF SOMATOMEDIN: CPT

## 2023-08-15 ASSESSMENT — PAIN SCALES - GENERAL: PAINLEVEL: NO PAIN (0)

## 2023-08-15 NOTE — LETTER
8/15/2023         RE: Sarah Frost  [address] [address] [facility] [address]        Dear Colleague,    Thank you for referring your patient, Sarah Frost, to the [facility]. Please see a copy of my visit note below.    NEUROSURGERY FOLLOWUP  NOTE    Sarah Frost comes today in f/u 10 weeks s/p 5/26/2023: Left parietal craniotomy for resection of mass.  Final pathology:A-B. 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      She is doing well following the surgery.  She is in the clinic today with her .  Patient had significant improvement in her right-sided weakness following the surgery.  She reports no new neurological symptoms during her clinic visit today.  She also reports no further episodes of visual flashes other than 1 episode on June 9, 2023.  She is off Keppra and off steroids.    She is scheduled to see neurology for seizure work-up in October.  She reports no issues with her incision.    PHYSICAL EXAM:   Constitutional: /85   Pulse 69   SpO2 98%      A & O in no acute distress.  Affect is appropriate.  Speech is fluent.  Recent and remote memory are intact.  Attention span and concentration are normal.     Motor:  Normal bulk and tone all muscle groups of upper and lower extremities.     Sensory: Sensation intact bilaterally to light touch.      Coordination:   Heel/toe/ gait intact.  Romberg's intact, tandem gait impaired      reflexes; supinator, biceps, triceps, knee/ ankle jerk intact.  No hoffmans/   no babinski/ clonus.     Incision healed well       IMAGING:   I personally reviewed all radiographic images and agree with the neuroradiology report.  There is no evidence of tumor regrowth or recurrence close to the superior sagittal sinus.     8/8/2023 MRI brain with and without contrast:  MPRESSION:  1.  Exam performed for surgical/therapy  planning purposes as above. Postsurgical changes as detailed.  2.  No superimposed acute intracranial process.     CONSULTATION ASSESSMENT AND PLAN:    Sarah Frost comes today in f/u 6 weeks s/p 5/26/2023: Left parietal craniotomy for resection of mass.  Final pathology:A-B. 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     She is doing well following the surgery.  She reports no new symptoms during her clinic visit today with her .  She is independent in terms of her daily activities.  She also completed the course of physical and Occupational Therapy.     She has an appointment with the neurologist in October for the seizure work-up.    I explained the follow-up MRI brain with and without at 3 months finding to the patient and showed them the images.  I explained to them that there is no evidence of any tumor recurrence especially at the lateral wall of the superior sagittal sinus.  She also has an incidental lesion involving her sella which likely represent benign lesion.    We will get baseline endocrine labs as a part of pituitary work-up.  I will repeat her MRI brain with and without contrast Stealth protocol at 6 months from now as a surveillance MRI.  I will follow her in 3 months without imaging to reassess her clinical progress.  She is also scheduled to see adult congenital cardiologist for evaluation of her ventricular septal defect and congenital valve abnormalities.      Patient and her  agreed with the plan.  All questions were answered and patient sounded understanding.  They can contact us if there are any further questions concerns or worsening neurological deficits.  I    I spent more than 20 minutes in this apt, examining the pt, reviewing the scans, reviewing notes from chart, discussing treatment options with risks and benefits and coordinating care.     Jude Monroy MD      CC:     No  Ref-Primary, Physician  No address on file      Again, thank you for allowing me to participate in the care of your patient.        Sincerely,        Jude Monroy MD

## 2023-08-15 NOTE — PROGRESS NOTES
NEUROSURGERY FOLLOWUP  NOTE    Sarah Frost comes today in f/u 10 weeks s/p 5/26/2023: Left parietal craniotomy for resection of mass.  Final pathology:A-B. 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      She is doing well following the surgery.  She is in the clinic today with her .  Patient had significant improvement in her right-sided weakness following the surgery.  She reports no new neurological symptoms during her clinic visit today.  She also reports no further episodes of visual flashes other than 1 episode on June 9, 2023.  She is off Keppra and off steroids.    She is scheduled to see neurology for seizure work-up in October.  She reports no issues with her incision.    PHYSICAL EXAM:   Constitutional: /85   Pulse 69   SpO2 98%      A & O in no acute distress.  Affect is appropriate.  Speech is fluent.  Recent and remote memory are intact.  Attention span and concentration are normal.     Motor:  Normal bulk and tone all muscle groups of upper and lower extremities.     Sensory: Sensation intact bilaterally to light touch.      Coordination:   Heel/toe/ gait intact.  Romberg's intact, tandem gait impaired      reflexes; supinator, biceps, triceps, knee/ ankle jerk intact.  No hoffmans/   no babinski/ clonus.     Incision healed well       IMAGING:   I personally reviewed all radiographic images and agree with the neuroradiology report.  There is no evidence of tumor regrowth or recurrence close to the superior sagittal sinus.     8/8/2023 MRI brain with and without contrast:  MPRESSION:  1.  Exam performed for surgical/therapy planning purposes as above. Postsurgical changes as detailed.  2.  No superimposed acute intracranial process.     CONSULTATION ASSESSMENT AND PLAN:    Sarah Frost comes today in f/u 6 weeks s/p 5/26/2023: Left parietal craniotomy for resection of mass.  Final  pathology:A-B. 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     She is doing well following the surgery.  She reports no new symptoms during her clinic visit today with her .  She is independent in terms of her daily activities.  She also completed the course of physical and Occupational Therapy.     She has an appointment with the neurologist in October for the seizure work-up.    I explained the follow-up MRI brain with and without at 3 months finding to the patient and showed them the images.  I explained to them that there is no evidence of any tumor recurrence especially at the lateral wall of the superior sagittal sinus.  She also has an incidental lesion involving her sella which likely represent benign lesion.    We will get baseline endocrine labs as a part of pituitary work-up.  I will repeat her MRI brain with and without contrast Stealth protocol at 6 months from now as a surveillance MRI.  I will follow her in 3 months without imaging to reassess her clinical progress.  She is also scheduled to see adult congenital cardiologist for evaluation of her ventricular septal defect and congenital valve abnormalities.      Patient and her  agreed with the plan.  All questions were answered and patient sounded understanding.  They can contact us if there are any further questions concerns or worsening neurological deficits.  I    I spent more than 20 minutes in this apt, examining the pt, reviewing the scans, reviewing notes from chart, discussing treatment options with risks and benefits and coordinating care.     Jude Monroy MD      CC:     No Ref-Primary, Physician  No address on file

## 2023-08-15 NOTE — NURSING NOTE
"Sarah Frost is a 63 year old female who presents for:  Chief Complaint   Patient presents with    RECHECK        Initial Vitals:  /85   Pulse 69   SpO2 98%  Estimated body mass index is 28.73 kg/m  as calculated from the following:    Height as of 5/27/23: 5' 11\" (1.803 m).    Weight as of 5/27/23: 206 lb (93.4 kg).. There is no height or weight on file to calculate BSA. BP completed using cuff size: regular  No Pain (0)    Jeff James    "

## 2023-08-15 NOTE — PATIENT INSTRUCTIONS
Patient Next Steps:      Order placed for imaging. MRI Brain in 6 months. You can schedule at our  today([place] location only), or Central Scheduling will call you to make the appointment. If you do not hear from them within 1-2 business days you can call the numbers below.   [phone] (south)  [phone] (north)  [phone] ([facility])  You can call [facility] (previously known as [facility]) to schedule your imaging at [phone]      Dr Monroy would like to see you back in the clinic for follow up in 3 months without imaging and again at 6 months to review MRI. Please call the number below to schedule.     Dr Monroy ordered an Endocrine panel.     Please call us if you have any further questions or concerns.    [facility]   Phone: [phone]  Fax: [phone]

## 2023-08-16 LAB
ANION GAP SERPL CALCULATED.3IONS-SCNC: 13 MMOL/L (ref 7–15)
BUN SERPL-MCNC: 12.4 MG/DL (ref 8–23)
CALCIUM SERPL-MCNC: 10 MG/DL (ref 8.8–10.2)
CHLORIDE SERPL-SCNC: 102 MMOL/L (ref 98–107)
CORTIS SERPL-MCNC: 10.2 UG/DL
CREAT SERPL-MCNC: 0.79 MG/DL (ref 0.51–0.95)
DEPRECATED HCO3 PLAS-SCNC: 24 MMOL/L (ref 22–29)
FSH SERPL IRP2-ACNC: 54.8 MIU/ML
GFR SERPL CREATININE-BSD FRML MDRD: 84 ML/MIN/1.73M2
GLUCOSE SERPL-MCNC: 93 MG/DL (ref 70–99)
LH SERPL-ACNC: 32.3 MIU/ML
POTASSIUM SERPL-SCNC: 4.2 MMOL/L (ref 3.4–5.3)
PROLACTIN SERPL 3RD IS-MCNC: 12 NG/ML (ref 5–23)
SODIUM SERPL-SCNC: 139 MMOL/L (ref 136–145)
T4 FREE SERPL-MCNC: 1.16 NG/DL (ref 0.9–1.7)
TSH SERPL DL<=0.005 MIU/L-ACNC: 1.77 UIU/ML (ref 0.3–4.2)

## 2023-08-17 LAB
ACTH PLAS-MCNC: <10 PG/ML
GH SERPL-MCNC: <0.1 UG/L

## 2023-08-18 LAB
IGF-I BLD-MCNC: 93 NG/ML (ref 38–244)
TESTOST SERPL-MCNC: 14 NG/DL (ref 8–60)

## 2023-08-20 ENCOUNTER — HEALTH MAINTENANCE LETTER (OUTPATIENT)
Age: 63
End: 2023-08-20

## 2023-08-21 LAB — A-PGH SER-MCNC: 1.1 NG/ML

## 2023-10-05 ENCOUNTER — OFFICE VISIT (OUTPATIENT)
Dept: NEUROLOGY | Facility: CLINIC | Age: 63
End: 2023-10-05
Attending: NEUROLOGICAL SURGERY
Payer: COMMERCIAL

## 2023-10-05 VITALS
SYSTOLIC BLOOD PRESSURE: 129 MMHG | HEART RATE: 80 BPM | BODY MASS INDEX: 30.02 KG/M2 | DIASTOLIC BLOOD PRESSURE: 86 MMHG | HEIGHT: 71 IN | TEMPERATURE: 97.3 F | WEIGHT: 214.4 LBS

## 2023-10-05 DIAGNOSIS — R56.9 SEIZURES (H): ICD-10-CM

## 2023-10-05 ASSESSMENT — PAIN SCALES - GENERAL: PAINLEVEL: NO PAIN (0)

## 2023-10-05 NOTE — LETTER
10/5/2023       RE: Sarah Frost  : 1960   MRN: [id number]      Dear Colleague,    Thank you for referring your patient, Sarah Frost, to the [facility] at [facility]. Please see a copy of my visit note below.    CHIEF COMPLAINT:  Seizures.    HISTORY OF PRESENT ILLNESS:  This patient is a 63 year old years old female presented for evaluation for seizure activities.  Patient is by herself in the clinic today.    Patient started to have seizures at 2021, when she had an episode of right leg weakness for about 15 min. This also happened in Oct. 2022 and 2023, she has some spells of right sided weakness which lasted for about 5 min.  On May 23rd, she had right sided weakness, and visual changes with flashes which lasted for 15-20 min, which led to the finding of left parietal meningioma, s/p resection in May 2023 by Dr. Monroy.   She was started on keppra for about one month, then she discontinued. She is on no AEDs for now.     Patient has only one type of seizure. 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 This happened only 4 times.    TRIGGERS FOR SEIZURES:    None    RISK FACTORS FOR SEIZURES:  Left parietal meningioma, s/p resection in May 2023. No history of head trauma with loss of consciousness, no history of CNS infection, no history of febrile convulsions.  No history of stroke. Normal development.     ALLERGIES:  Patient has no known allergies.    CURRENT MEDICATIONS:  Current Outpatient Medications   Medication    acetaminophen (TYLENOL) 325 MG tablet    dexamethasone (DECADRON) 1 MG tablet    levETIRAcetam (KEPPRA) 500 MG tablet    oxyCODONE (ROXICODONE) 5 MG tablet    senna-docusate (SENOKOT-S/PERICOLACE) 8.6-50 MG tablet     No current facility-administered medications for this visit.       PAST  "AEDs:  Keppra.    PAST MEDICAL HISTORY:  Past Medical History:   Diagnosis Date    Congenital anomaly of heart valve      Ventricular septal defect (VSD).    PAST SURGICAL HISTORY:  Past Surgical History:   Procedure Laterality Date    OPTICAL TRACKING SYSTEM CRANIOTOMY, EXCISE TUMOR, COMBINED Left 5/26/2023    Procedure: CRANIOTOMY, USING OPTICAL TRACKING SYSTEM, WITH NEOPLASM EXCISION STEALTH ASSISTED LEFT FRONTOPARIETAL CRANIOTOMY WITH RESCETION OF BRAIN TUMOR;  Surgeon: Jude Monroy MD;  Location:  OR       Family History:  No family history on file.    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  She is living with her .  No tobacco, ETOH occasionally. No drugs.    REVIEW OF SYSTEMS:   Negative       PHYSICAL EXAMINATIONS:     Blood pressure 129/86, pulse 80, temperature 97.3  F (36.3  C), temperature source Temporal, height 5' 11\" (180.3 cm), weight 214 lb 6.4 oz (97.3 kg).    General exam: General Appearance:  No acute distress.  HEENT:  Normocephalic, atraumatic.  Neck:  Supple, no lymphadenopathy. Extremities:  No edema, no clubbing, no cyanosis.      Neurologic Exam:  Alert and oriented x3.  Speech fluent, appropriate. Normal attention.  Cranial Nerves:  Pupils are equal, round, reactive to light and accomodation.  Extraocular movement intact.  No facial weakness or asymmetry.  Facial sensation was normal.  Tongue and palate midline.  Hearing normal.  Visual field : normal to confrontation.   Motor Exam:  Normal bulk.  Normal tone.  Strength 5/5 in all extremities.  Sensory:  Decreased sensation on the right toes and right side of the foot in all modalities. Deep tendon reflexes 2+ bilaterally in both upper and lower extremities.  Coordination:  Finger-to-nose, heel-to-shin exams showed no ataxia.  Rapid alternating movement was normal.  Gait and Station:  normal casual gait.  Difficulties with tandem gait. No difficulties with tip-toe or heel walking.  Romberg sign " negative.      PREVIOUS DIAGNOSTIC TESTING:    MRI brain:   IMPRESSION: (5/23/2023)  1.  There is a large heterogeneously enhancing solid and cystic or necrotic lesion arising from the posterior superior aspect of the left frontal lobe, favored to be intra-axial. Primary differential considerations include high-grade primary brain   neoplasm or metastasis. Moderate associated vasogenic edema.     2.  4 mm ring-enhancing lesion is seen along the ventral base of the infundibulum, nonspecific. This could reflect an additional metastasis. Small plaque-like area of dural-based enhancement seen along the posterior aspect of the falx, nonspecific but   compatible with a meningioma (versus dural metastasis).     3.  2 mm nonenhancing focus posterior aspect of the pituitary likely reflecting a tiny cyst or cystically degenerated adenoma. Suggest further assessment with dedicated MRI of the sella.    MRI brain (8/8/2023)  IMPRESSION:  1.  Exam performed for surgical/therapy planning purposes as above. Postsurgical changes as detailed.  2.  No superimposed acute intracranial process.    EEG: (5/23/2023)  IMPRESSION: This electroencephalogram is abnormal due to the presence of focal delta slowing over the left posterior temporoparietal head region, consistent with structural abnormality there.  No electrographic seizures or epileptiform discharges were recorded. Clinical correlation is advised.      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  Her seizures were manifested by right leg weakness lasting for 5 to 15 minutes.  She had total of 4 focal aware seizures from 2021 to 2023.  Her last seizure led to the discovery of left parietal meningioma which was later resected in May 2023 by Dr. Monroy.    Patient had no further seizures after the removal of the meningioma.  No other symptoms.  She was on Keppra temporarily for about a month.  She is now  off Keppra.    At this time there is no need for further work-up or testing for her seizures.  No need for antiseizure medications at this point.    PLAN:  No further work up is needed at this time.  No need for AEDs.  RTC in 6 months.      50 min total time was spent on the day of this visit.      30 min was spent on face to face time  20 min was spent on preparation of visit to review charts and labs, ordering medications and tests, and documentation of clinical information    Preventive Care:    Breast Cancer Screening: During our visit today, we discussed that it is recommended you receive breast cancer screening. Please call or make an appointment with your primary care provider to discuss this with them. You may also call the [facility] scheduling line ([phone]) to set up a mammography appointment at the Breast Center within the [facility].    Colorectal Cancer Screening: During our visit today, we discussed that it is recommended you receive colorectal cancer screening. Please call or make an appointment with your primary care provider to discuss this. You may also call the [facility] scheduling line ([phone]) to set up a colonoscopy appointment.           Again, thank you for allowing me to participate in the care of your patient.      Sincerely,    Zbginiew Olson MD

## 2023-10-05 NOTE — PROGRESS NOTES
Preventive Care:    Breast Cancer Screening: During our visit today, we discussed that it is recommended you receive breast cancer screening. Please call or make an appointment with your primary care provider to discuss this with them. You may also call the  [facility] scheduling line ([phone]) to set up a mammography appointment at the Breast Center within the [facility].    Colorectal Cancer Screening: During our visit today, we discussed that it is recommended you receive colorectal cancer screening. Please call or make an appointment with your primary care provider to discuss this. You may also call the  [employer] scheduling line ([phone]) to set up a colonoscopy appointment.

## 2023-10-05 NOTE — PROGRESS NOTES
CHIEF COMPLAINT:  Seizures.    HISTORY OF PRESENT ILLNESS:  This patient is a 63 year old years old female presented for evaluation for seizure activities.  Patient is by herself in the clinic today.    Patient started to have seizures at Jan 2021, when she had an episode of right leg weakness for about 15 min. This also happened in Oct. 2022 and April of 2023, she has some spells of right sided weakness which lasted for about 5 min.  On May 23rd, she had right sided weakness, and visual changes with flashes which lasted for 15-20 min, which led to the finding of left parietal meningioma, s/p resection in May 2023 by Dr. Monroy.   She was started on keppra for about one month, then she discontinued. She is on no AEDs for now.     Patient has only one type of seizure. 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 This happened only 4 times.    TRIGGERS FOR SEIZURES:    None    RISK FACTORS FOR SEIZURES:  Left parietal meningioma, s/p resection in May 2023. No history of head trauma with loss of consciousness, no history of CNS infection, no history of febrile convulsions.  No history of stroke. Normal development.     ALLERGIES:  Patient has no known allergies.    CURRENT MEDICATIONS:  Current Outpatient Medications   Medication    acetaminophen (TYLENOL) 325 MG tablet    dexamethasone (DECADRON) 1 MG tablet    levETIRAcetam (KEPPRA) 500 MG tablet    oxyCODONE (ROXICODONE) 5 MG tablet    senna-docusate (SENOKOT-S/PERICOLACE) 8.6-50 MG tablet     No current facility-administered medications for this visit.       PAST AEDs:  Keppra.    PAST MEDICAL HISTORY:  Past Medical History:   Diagnosis Date    Congenital anomaly of heart valve      Ventricular septal defect (VSD).    PAST SURGICAL HISTORY:  Past Surgical History:   Procedure Laterality Date    OPTICAL TRACKING SYSTEM CRANIOTOMY, EXCISE TUMOR, COMBINED  "Left 5/26/2023    Procedure: CRANIOTOMY, USING OPTICAL TRACKING SYSTEM, WITH NEOPLASM EXCISION STEALTH ASSISTED LEFT FRONTOPARIETAL CRANIOTOMY WITH RESCETION OF BRAIN TUMOR;  Surgeon: Jude Monroy MD;  Location: [facility] OR       Family History:  No family history on file.    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  She is living with her .  No tobacco, ETOH occasionally. No drugs.    REVIEW OF SYSTEMS:   Negative       PHYSICAL EXAMINATIONS:     Blood pressure 129/86, pulse 80, temperature 97.3  F (36.3  C), temperature source Temporal, height 5' 11\" (180.3 cm), weight 214 lb 6.4 oz (97.3 kg).    General exam: General Appearance:  No acute distress.  HEENT:  Normocephalic, atraumatic.  Neck:  Supple, no lymphadenopathy. Extremities:  No edema, no clubbing, no cyanosis.      Neurologic Exam:  Alert and oriented x3.  Speech fluent, appropriate. Normal attention.  Cranial Nerves:  Pupils are equal, round, reactive to light and accomodation.  Extraocular movement intact.  No facial weakness or asymmetry.  Facial sensation was normal.  Tongue and palate midline.  Hearing normal.  Visual field : normal to confrontation.   Motor Exam:  Normal bulk.  Normal tone.  Strength 5/5 in all extremities.  Sensory:  Decreased sensation on the right toes and right side of the foot in all modalities. Deep tendon reflexes 2+ bilaterally in both upper and lower extremities.  Coordination:  Finger-to-nose, heel-to-shin exams showed no ataxia.  Rapid alternating movement was normal.  Gait and Station:  normal casual gait.  Difficulties with tandem gait. No difficulties with tip-toe or heel walking.  Romberg sign negative.      PREVIOUS DIAGNOSTIC TESTING:    MRI brain:   IMPRESSION: (5/23/2023)  1.  There is a large heterogeneously enhancing solid and cystic or necrotic lesion arising from the posterior superior aspect of the left frontal lobe, favored to be intra-axial. Primary differential considerations " include high-grade primary brain   neoplasm or metastasis. Moderate associated vasogenic edema.     2.  4 mm ring-enhancing lesion is seen along the ventral base of the infundibulum, nonspecific. This could reflect an additional metastasis. Small plaque-like area of dural-based enhancement seen along the posterior aspect of the falx, nonspecific but   compatible with a meningioma (versus dural metastasis).     3.  2 mm nonenhancing focus posterior aspect of the pituitary likely reflecting a tiny cyst or cystically degenerated adenoma. Suggest further assessment with dedicated MRI of the sella.    MRI brain (8/8/2023)  IMPRESSION:  1.  Exam performed for surgical/therapy planning purposes as above. Postsurgical changes as detailed.  2.  No superimposed acute intracranial process.    EEG: (5/23/2023)  IMPRESSION: This electroencephalogram is abnormal due to the presence of focal delta slowing over the left posterior temporoparietal head region, consistent with structural abnormality there.  No electrographic seizures or epileptiform discharges were recorded. Clinical correlation is advised.      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  Her seizures were manifested by right leg weakness lasting for 5 to 15 minutes.  She had total of 4 focal aware seizures from 2021 to 2023.  Her last seizure led to the discovery of left parietal meningioma which was later resected in May 2023 by Dr. Monroy.    Patient had no further seizures after the removal of the meningioma.  No other symptoms.  She was on Keppra temporarily for about a month.  She is now off Keppra.    At this time there is no need for further work-up or testing for her seizures.  No need for antiseizure medications at this point.    PLAN:  No further work up is needed at this time.  No need for AEDs.  RTC in 6 months.      50 min total time was spent on the day of this visit.      30  min was spent on face to face time  20 min was spent on preparation of visit to review charts and labs, ordering medications and tests, and documentation of clinical information

## 2023-10-30 ENCOUNTER — TELEPHONE (OUTPATIENT)
Dept: NEUROSURGERY | Facility: CLINIC | Age: 63
End: 2023-10-30
Payer: COMMERCIAL

## 2023-10-30 NOTE — TELEPHONE ENCOUNTER
Left message for patient about rescheduling the follow up visit with Dr. Monroy on November 14. Looking to clean up the scheduled gaps, and offered to move to either 9:40 or 10:20 on the same date of November 14.     Patient was given clinic number to call back

## 2023-11-09 ENCOUNTER — OFFICE VISIT (OUTPATIENT)
Dept: CARDIOLOGY | Facility: CLINIC | Age: 63
End: 2023-11-09
Payer: COMMERCIAL

## 2023-11-09 VITALS
SYSTOLIC BLOOD PRESSURE: 137 MMHG | HEART RATE: 73 BPM | WEIGHT: 221 LBS | HEIGHT: 72 IN | DIASTOLIC BLOOD PRESSURE: 84 MMHG | BODY MASS INDEX: 29.93 KG/M2

## 2023-11-09 DIAGNOSIS — Q24.9 ADULT CONGENITAL HEART DISEASE: ICD-10-CM

## 2023-11-09 DIAGNOSIS — Q21.0 SEPTAL DEFECT, VENTRICULAR: ICD-10-CM

## 2023-11-09 DIAGNOSIS — Q23.81 BICUSPID AORTIC VALVE: ICD-10-CM

## 2023-11-09 DIAGNOSIS — G93.89 BRAIN MASS: ICD-10-CM

## 2023-11-09 DIAGNOSIS — R73.01 IMPAIRED FASTING GLUCOSE: Primary | ICD-10-CM

## 2023-11-09 PROCEDURE — 99203 OFFICE O/P NEW LOW 30 MIN: CPT | Performed by: INTERNAL MEDICINE

## 2023-11-09 PROCEDURE — 93005 ELECTROCARDIOGRAM TRACING: CPT | Performed by: INTERNAL MEDICINE

## 2023-11-09 RX ORDER — IBUPROFEN 200 MG
200 TABLET ORAL EVERY 4 HOURS PRN
COMMUNITY
End: 2023-11-22

## 2023-11-09 RX ORDER — MULTIPLE VITAMINS W/ MINERALS TAB 9MG-400MCG
1 TAB ORAL DAILY
COMMUNITY

## 2023-11-09 NOTE — PROGRESS NOTES
CARDIOLOGY CONSULTATION:    Ms Frost is a 63 yr old female with a past medical history significant for VSD, bicuspid valve with moderate stenosis, PVCs and recent diagnosis in May 2023 of left parietal meningioma for which she underwent resection in May of 2023.  She has focal aware seizures with intermittent right leg weakness, for which she is followed by neurology.   At the time of her hospitalization she was seen by my colleague Dr. Gilbert, who cleared her for surgery, and recommended follow up in [facility] clinic, for which she is here today.     Echo 2023 showed a small restrictive membranous ventricular septal defect with small left to right ventricular shunt.  She had mild to moderate valvular aortic stenosis in the setting of a bicuspid aortic valve with a mean gradient of 19 mmHg.  Her ascending aorta and aortic root were no dilated and she had a normal EF at 60%. Her last echo that I have available to me was from  and she had a mean gradient across her aortic valve of 10 mmHg at that time.    Has had an impaired fasting blood sugar in the past and no A1C. Family history of early CAD and no lipids. She has 2 daughters and 7 siblings and none of them have been screened for BAV as she was not aware. BP has been 120/80 at home. She is retired physical therapist.  Her sister had a child born with d-transposition and  shortly after birth as it was not recognized initially.  She continues to take antibiotics before the dentist.      PAST MEDICAL HISTORY:  Past Medical History:   Diagnosis Date    Congenital anomaly of heart valve        CURRENT MEDICATIONS:  Current Outpatient Medications   Medication Sig Dispense Refill    ibuprofen (ADVIL/MOTRIN) 200 MG tablet Take 200 mg by mouth every 4 hours as needed for pain      multivitamin w/minerals (MULTI-VITAMIN) tablet Take 1 tablet by mouth daily      acetaminophen (TYLENOL) 325 MG tablet Take 2 tablets (650 mg) by mouth every 4 hours as needed for other  (For optimal non-opioid multimodal pain management to improve pain control.) (Patient not taking: Reported on 11/9/2023) 100 tablet 0    dexamethasone (DECADRON) 1 MG tablet Take 4 tablets (4 mg) by mouth daily (with breakfast) for 3 days, THEN 3 tablets (3 mg) daily (with breakfast) for 4 days, THEN 2 tablets (2 mg) daily (with breakfast) for 4 days. 32 tablet 0    levETIRAcetam (KEPPRA) 500 MG tablet Take 1 tablet (500 mg) by mouth 2 times daily (Patient not taking: Reported on 8/15/2023) 60 tablet 0    oxyCODONE (ROXICODONE) 5 MG tablet Take 1 tablet (5 mg) by mouth every 4 hours as needed for moderate to severe pain (Patient not taking: Reported on 8/15/2023) 40 tablet 0    senna-docusate (SENOKOT-S/PERICOLACE) 8.6-50 MG tablet Take 1 tablet by mouth 2 times daily as needed for constipation (Patient not taking: Reported on 8/15/2023) 30 tablet 0       PAST SURGICAL HISTORY:  Past Surgical History:   Procedure Laterality Date    OPTICAL TRACKING SYSTEM CRANIOTOMY, EXCISE TUMOR, COMBINED Left 5/26/2023    Procedure: CRANIOTOMY, USING OPTICAL TRACKING SYSTEM, WITH NEOPLASM EXCISION STEALTH ASSISTED LEFT FRONTOPARIETAL CRANIOTOMY WITH RESCETION OF BRAIN TUMOR;  Surgeon: Jude Monroy MD;  Location: [facility]       ALLERGIES  Patient has no known allergies.    FAMILY HX:  Family History   Problem Relation Age of Onset    Myocardial Infarction Father     Chronic Obstructive Pulmonary Disease Maternal Grandfather     Myocardial Infarction Maternal Grandfather        SOCIAL HX:  Social History     Socioeconomic History    Marital status:      Spouse name: None    Number of children: None    Years of education: None    Highest education level: None   Tobacco Use    Smoking status: Never    Smokeless tobacco: Never   Substance and Sexual Activity    Alcohol use: Yes     Comment: wine occasionally    Drug use: Never       ROS:  Constitutional: No fever, chills, or sweats. No weight gain/loss.   ENT: No visual  "disturbance, ear ache, epistaxis, sore throat.   Allergies/Immunologic: Negative.   Respiratory: No cough, hemoptysis.   Cardiovascular: As per HPI.   GI: No nausea, vomiting, hematemesis, melena, or hematochezia.   : No urinary frequency, dysuria, or hematuria.   Integument: Negative.   Psychiatric: Negative.   Neuro: Negative.   Endocrinology: Negative.   Musculoskeletal: No myalgia.    VITAL SIGNS:  /84   Pulse 73   Ht 1.816 m (5' 11.5\")   Wt 100.2 kg (221 lb)   BMI 30.39 kg/m    Body mass index is 30.39 kg/m .  Wt Readings from Last 2 Encounters:   11/09/23 100.2 kg (221 lb)   10/05/23 97.3 kg (214 lb 6.4 oz)       PHYSICAL EXAM  Sarah Frost IS A 63 year old female.in no acute distress.  HEENT: Unremarkable.  Neck: JVP normal.  Carotids +4/4 bilaterally without bruits.  Lungs: CTA.  Cor: RRR. Normal S1 and S2 with click..  2/6 holosystolic murmur. Abd: Soft, nontender.  Extremities: No C/C/E.   Neuro: Grossly intact.    LABS    Lab Results   Component Value Date    WBC 8.0 05/28/2023    WBC 4.8 01/18/2021     Lab Results   Component Value Date    RBC 3.94 05/28/2023    RBC 4.30 01/18/2021     Lab Results   Component Value Date    HGB 12.9 05/28/2023    HGB 13.9 01/18/2021     Lab Results   Component Value Date    HCT 39.0 05/28/2023    HCT 42.0 01/18/2021     No components found for: \"MCT\"  Lab Results   Component Value Date    MCV 99 05/28/2023    MCV 98 01/18/2021     Lab Results   Component Value Date    MCH 32.7 05/28/2023    MCH 32.3 01/18/2021     Lab Results   Component Value Date    MCHC 33.1 05/28/2023    MCHC 33.1 01/18/2021     Lab Results   Component Value Date    RDW 11.9 05/28/2023    RDW 12.5 01/18/2021     Lab Results   Component Value Date     05/28/2023     01/18/2021      Recent Labs   Lab Test 08/15/23  1502 05/28/23  0824    140   POTASSIUM 4.2 4.6   CHLORIDE 102 103   CO2 24 29   ANIONGAP 13 8   GLC 93 94   BUN 12.4 17.4   CR 0.79 0.95   QUAN 10.0 9.9 "       ASSESSMENT AND PLAN:  1. Small restrictive membranous VSD  2. PVCS  3. Bicuspid aortic valve with mild stenosis -mean gradient of 19 mmHg. Normal aorta dimensions  4. Left parietal meningioma for which she underwent resection in May of 2023.   5. Family history of early CAD  6. Impaired fasting blood sugar  7. BMI 31    It was a pleasure to be involved in the care of Ms. Frost. I reviewed her history and symptoms in detail and reviewed her echo personally.  She has a BAV with mild stenosis and no aorta enlargement of concern on echo.  She also has a small restrictive VSD with no significant chamber enlargement and no other concerning valve disease or evidence of PH.    Discussed first degree relatives getting screened with an echo for BAV.   BP under control at home.     She has not had an A1C or lipids and ordered those that she will have available when she has a new primary care visit later this year. Getting stronger and more active after her surgery and no concerning cardiac symptoms.     Follow up in a year with MRI/MRA.      It was a pleasure to be involved in her care. Please do not hesitate to contact me with questions.     ABE Patterson MD   40 minutes face-face, documentation and review of records on day of visit

## 2023-11-09 NOTE — PATIENT INSTRUCTIONS
You were seen today in the Adult Congenital and Cardiovascular Genetics Clinic at the [facility].    Cardiology Providers you saw during your visit:  ABE Patterson MD    Diagnosis:  VSD    Results:  ABE Patterson MD reviewed the results of your EKG testing today in clinic.    Recommendations for you:    Please make a lab appointment at any [place] location to get lipids and hemoglobin A1c drawn, we will keep an eye for results. This can be done whenever it works best for you.      General Cardiac Recommendations:  Continue to eat a heart healthy, low salt diet.  Continue to get 20-30 minutes of aerobic activity, 4-5 days per week.  Examples of aerobic activity include walking, running, swimming, cycling, etc.  Continue to observe good oral hygiene, with regular dental visits.        Follow-up:  Follow up with Dr Patterson in 1 year with a cardiac MRI/MRA prior    Cardiac MRI  Magnetic resonance imaging (MRI) is a test that lets your doctor see detailed pictures of the inside of your body. MRI combines the use of strong magnets and radio waves to form an MRI image. A Cardiac MRI will give a detailed image of your heart.  Follow these instructions:  1. Please no eating or drinking 3 hours prior to the procedure.   2. No caffeine, alcohol or tobacco 12 hours prior to the procedure.    During the procedure:  Please arrive to the Gold Waiting Room in the [facility].   You will be required to lay flat and follow breath-hold instructions.   You will need to remove all metal and answer a safety questionnaire. Please wear clothes without metal (snaps and zippers). Please remove any body piercings and hair extensions before you arrive. You will also remove watches, jewelry, hairpins, wallets, dentures, partial dental plates and hearing aids. You may wear contact lenses, and you may be able to wear your rings. We have a safe place to keep your personal items, but it is safer to leave them at home.  You will  be given IV contrast for this exam.  To prepare:  The day before the exam drink extra fluid - at least six 8oz glasses (unless you are on a fluid restriction per your doctor)       If you have questions or concerns please contact us at:    Amanda Carbajal, RN, BSN   Izabela Green (Scheduling)  Nurse Care Coordinator     Clinic   Adult Congenital and CV Genetics   Adult Congenital and CV Genetic  [facility]   [facility]  (P) [phone]     (P) [phone]            (F) [phone]        For after hours urgent needs, call [phone] and ask to speak to the Adult Congenital Physician on call.  Mention Job Code 0401.    For emergencies call 911.    [facility]   Clin[facility] and [facility]  Mail Code [id number]  [address] [place]  [address]

## 2023-11-09 NOTE — LETTER
2023    Physician No Ref-Primary  No address on file    RE: Sarah Frost       Dear Colleague,     I had the pleasure of seeing Sarah Frost in the [facility].  CARDIOLOGY CONSULTATION:    Ms Frost is a 63 yr old female with a past medical history significant for VSD, bicuspid valve with moderate stenosis, PVCs and recent diagnosis in May 2023 of left parietal meningioma for which she underwent resection in May of 2023.  She has focal aware seizures with intermittent right leg weakness, for which she is followed by neurology.   At the time of her hospitalization she was seen by my colleague Dr. Gilbert, who cleared her for surgery, and recommended follow up in [facility] clinic, for which she is here today.     Echo 2023 showed a small restrictive membranous ventricular septal defect with small left to right ventricular shunt.  She had mild to moderate valvular aortic stenosis in the setting of a bicuspid aortic valve with a mean gradient of 19 mmHg.  Her ascending aorta and aortic root were no dilated and she had a normal EF at 60%. Her last echo that I have available to me was from  and she had a mean gradient across her aortic valve of 10 mmHg at that time.    Has had an impaired fasting blood sugar in the past and no A1C. Family history of early CAD and no lipids. She has 2 daughters and 7 siblings and none of them have been screened for BAV as she was not aware. BP has been 120/80 at home. She is retired physical therapist.  Her sister had a child born with d-transposition and  shortly after birth as it was not recognized initially.  She continues to take antibiotics before the dentist.      PAST MEDICAL HISTORY:  Past Medical History:   Diagnosis Date    Congenital anomaly of heart valve        CURRENT MEDICATIONS:  Current Outpatient Medications   Medication Sig Dispense Refill    ibuprofen (ADVIL/MOTRIN) 200 MG tablet Take 200 mg by mouth every 4 hours as needed for pain       multivitamin w/minerals (MULTI-VITAMIN) tablet Take 1 tablet by mouth daily      acetaminophen (TYLENOL) 325 MG tablet Take 2 tablets (650 mg) by mouth every 4 hours as needed for other (For optimal non-opioid multimodal pain management to improve pain control.) (Patient not taking: Reported on 11/9/2023) 100 tablet 0    dexamethasone (DECADRON) 1 MG tablet Take 4 tablets (4 mg) by mouth daily (with breakfast) for 3 days, THEN 3 tablets (3 mg) daily (with breakfast) for 4 days, THEN 2 tablets (2 mg) daily (with breakfast) for 4 days. 32 tablet 0    levETIRAcetam (KEPPRA) 500 MG tablet Take 1 tablet (500 mg) by mouth 2 times daily (Patient not taking: Reported on 8/15/2023) 60 tablet 0    oxyCODONE (ROXICODONE) 5 MG tablet Take 1 tablet (5 mg) by mouth every 4 hours as needed for moderate to severe pain (Patient not taking: Reported on 8/15/2023) 40 tablet 0    senna-docusate (SENOKOT-S/PERICOLACE) 8.6-50 MG tablet Take 1 tablet by mouth 2 times daily as needed for constipation (Patient not taking: Reported on 8/15/2023) 30 tablet 0       PAST SURGICAL HISTORY:  Past Surgical History:   Procedure Laterality Date    OPTICAL TRACKING SYSTEM CRANIOTOMY, EXCISE TUMOR, COMBINED Left 5/26/2023    Procedure: CRANIOTOMY, USING OPTICAL TRACKING SYSTEM, WITH NEOPLASM EXCISION STEALTH ASSISTED LEFT FRONTOPARIETAL CRANIOTOMY WITH RESCETION OF BRAIN TUMOR;  Surgeon: Jude Monroy MD;  Location: [facility] OR       ALLERGIES  Patient has no known allergies.    FAMILY HX:  Family History   Problem Relation Age of Onset    Myocardial Infarction Father     Chronic Obstructive Pulmonary Disease Maternal Grandfather     Myocardial Infarction Maternal Grandfather        SOCIAL HX:  Social History     Socioeconomic History    Marital status:      Spouse name: None    Number of children: None    Years of education: None    Highest education level: None   Tobacco Use    Smoking status: Never    Smokeless tobacco: Never   Substance and  "Sexual Activity    Alcohol use: Yes     Comment: wine occasionally    Drug use: Never       ROS:  Constitutional: No fever, chills, or sweats. No weight gain/loss.   ENT: No visual disturbance, ear ache, epistaxis, sore throat.   Allergies/Immunologic: Negative.   Respiratory: No cough, hemoptysis.   Cardiovascular: As per HPI.   GI: No nausea, vomiting, hematemesis, melena, or hematochezia.   : No urinary frequency, dysuria, or hematuria.   Integument: Negative.   Psychiatric: Negative.   Neuro: Negative.   Endocrinology: Negative.   Musculoskeletal: No myalgia.    VITAL SIGNS:  /84   Pulse 73   Ht 1.816 m (5' 11.5\")   Wt 100.2 kg (221 lb)   BMI 30.39 kg/m    Body mass index is 30.39 kg/m .  Wt Readings from Last 2 Encounters:   11/09/23 100.2 kg (221 lb)   10/05/23 97.3 kg (214 lb 6.4 oz)       PHYSICAL EXAM  Sarah Frost IS A 63 year old female.in no acute distress.  HEENT: Unremarkable.  Neck: JVP normal.  Carotids +4/4 bilaterally without bruits.  Lungs: CTA.  Cor: RRR. Normal S1 and S2 with click..  2/6 holosystolic murmur. Abd: Soft, nontender.  Extremities: No C/C/E.   Neuro: Grossly intact.    LABS    Lab Results   Component Value Date    WBC 8.0 05/28/2023    WBC 4.8 01/18/2021     Lab Results   Component Value Date    RBC 3.94 05/28/2023    RBC 4.30 01/18/2021     Lab Results   Component Value Date    HGB 12.9 05/28/2023    HGB 13.9 01/18/2021     Lab Results   Component Value Date    HCT 39.0 05/28/2023    HCT 42.0 01/18/2021     No components found for: \"MCT\"  Lab Results   Component Value Date    MCV 99 05/28/2023    MCV 98 01/18/2021     Lab Results   Component Value Date    MCH 32.7 05/28/2023    MCH 32.3 01/18/2021     Lab Results   Component Value Date    MCHC 33.1 05/28/2023    MCHC 33.1 01/18/2021     Lab Results   Component Value Date    RDW 11.9 05/28/2023    RDW 12.5 01/18/2021     Lab Results   Component Value Date     05/28/2023     01/18/2021      Recent Labs "   Lab Test 08/15/23  1502 05/28/23  0824    140   POTASSIUM 4.2 4.6   CHLORIDE 102 103   CO2 24 29   ANIONGAP 13 8   GLC 93 94   BUN 12.4 17.4   CR 0.79 0.95   QUAN 10.0 9.9       ASSESSMENT AND PLAN:  1. Small restrictive membranous VSD  2. PVCS  3. Bicuspid aortic valve with mild stenosis -mean gradient of 19 mmHg. Normal aorta dimensions  4. Left parietal meningioma for which she underwent resection in May of 2023.   5. Family history of early CAD  6. Impaired fasting blood sugar  7. BMI 31    It was a pleasure to be involved in the care of Ms. Frost. I reviewed her history and symptoms in detail and reviewed her echo personally.  She has a BAV with mild stenosis and no aorta enlargement of concern on echo.  She also has a small restrictive VSD with no significant chamber enlargement and no other concerning valve disease or evidence of PH.    Discussed first degree relatives getting screened with an echo for BAV.   BP under control at home.     She has not had an A1C or lipids and ordered those that she will have available when she has a new primary care visit later this year. Getting stronger and more active after her surgery and no concerning cardiac symptoms.     Follow up in a year with MRI/MRA.      It was a pleasure to be involved in her care. Please do not hesitate to contact me with questions.     ABE Patterson MD   40 minutes face-face, documentation and review of records on day of visit       Thank you for allowing me to participate in the care of your patient.      Sincerely,     Willy Patterson MD     [facility]  cc:   Jude Monroy MD  [address] [id number]

## 2023-11-09 NOTE — NURSING NOTE
Cardiac Testing: Patient given instructions regarding  echocardiogram . Discussed purpose, preparation, procedure and when to expect results reported back to the patient. Patient demonstrated understanding of this information and agreed to call with further questions or concerns.    Labs: Patient was given results of the laboratory testing obtained today. Patient was instructed to return for the next laboratory testing in [facility]/MRA . Patient demonstrated understanding of this information and agreed to call with further questions or concerns.     Med Reconcile: Reviewed and verified all current medications with the patient. The updated medication list was printed and given to the patient.  Return Appointment: Patient given instructions regarding scheduling next clinic visit. Patient demonstrated understanding of this information and agreed to call with further questions or concerns.    Patient stated she understood all health information given and agreed to call with further questions or concerns.

## 2023-11-13 ENCOUNTER — TELEPHONE (OUTPATIENT)
Dept: NEUROSURGERY | Facility: CLINIC | Age: 63
End: 2023-11-13
Payer: COMMERCIAL

## 2023-11-14 ENCOUNTER — OFFICE VISIT (OUTPATIENT)
Dept: NEUROSURGERY | Facility: CLINIC | Age: 63
End: 2023-11-14
Payer: COMMERCIAL

## 2023-11-14 VITALS — DIASTOLIC BLOOD PRESSURE: 85 MMHG | SYSTOLIC BLOOD PRESSURE: 149 MMHG | OXYGEN SATURATION: 97 % | HEART RATE: 67 BPM

## 2023-11-14 DIAGNOSIS — D32.9 MENINGIOMA (H): Primary | ICD-10-CM

## 2023-11-14 PROCEDURE — 99213 OFFICE O/P EST LOW 20 MIN: CPT | Performed by: NEUROLOGICAL SURGERY

## 2023-11-14 NOTE — PROGRESS NOTES
"NEUROSURGERY FOLLOWUP  NOTE    Sarah Frost comes today in f/u 6 months s/p 5/26/2023: Left parietal craniotomy for resection of mass.  Final pathology:A-B. 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      She is doing well since the last visit.  Patient had significant improvement in her right-sided weakness following the surgery.  She reports intermittent flashes in her left eye towards the end of the day when she is tired.  She has few such episodes with no obvious weakness, generalized tonic-clonic movement or episodes of absence seizures.  She attributes her flashes to fatigue and tiredness. She is off Keppra and off steroids.    He has MRI brain with and without contrast scheduled on 02/01/2024.  Her pituitary function labs are within normal limits.    She has also followed with a cardiologist on 11/9/2023 who recommended follow-up in a year with MRI and MRA.    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  Her seizures were manifested by right leg weakness lasting for 5 to 15 minutes.  She had total of 4 focal aware seizures from 2021 to 2023.  Her last seizure led to the discovery of left parietal meningioma which was later resected in May 2023 by Dr. Monroy.     Patient had no further seizures after the removal of the meningioma.  No other symptoms.  She was on Keppra temporarily for about a month.  She is now off Keppra.     At this time there is no need for further work-up or testing for her seizures.  No need for antiseizure medications at this point.\"    PHYSICAL EXAM:   Constitutional: BP (!) 149/85   Pulse 67   SpO2 97%      Motor:  Normal bulk and tone all muscle groups of upper and lower extremities.     Sensory: Sensation intact bilaterally to light touch.      Coordination:   " Heel/toe/ gait intact.  Romberg's intact, tandem gait impaired      Reflexes; supinator, biceps, triceps, knee/ ankle jerk intact.  No hoffmans/   no babinski/ clonus.     Incision healed well      IMAGING:   No new images today.    8/8/2023 MRI brain with and without contrast:  MPRESSION:  1.  Exam performed for surgical/therapy planning purposes as above. Postsurgical changes as detailed.  2.  No superimposed acute intracranial process.     CONSULTATION ASSESSMENT AND PLAN:    Sarah Frost comes today in f/u 6 months s/p 5/26/2023: Left parietal craniotomy for resection of mass.  Final pathology:A-B. 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     She is doing well with intermittent flashes of light without obvious evidence of weakness, focal seizures, absence episodes, generalized tonic-clonic movements.  She attributes her flashes to being fatigued which primary happens towards the end of the day.  She reports 2-3 such episodes since her last visit.  She is independent in terms of her daily activities.      She has seen neurologist who recommended no further work-up for seizures and no need for antiseizure medications.  She also followed with her cardiologist for structural heart disease who recommended follow-up with MRI and MRI in 1 year.    I explained to the patient to follow with her neurologist if she continues to have visual flashes and we will repeat MRI if such episodes are more frequent or with any other new onset neurological symptoms.  I will follow her in another 3 months with follow-up MRI which is scheduled on February 1, 2024.     Patient agreed with the plan.  All questions were answered and patient sounded understanding.  They can contact us if there are any further questions concerns or worsening neurological deficits.     I spent more than 20 minutes in this apt, examining the pt, reviewing the  scans, reviewing notes from chart, discussing treatment options with risks and benefits and coordinating care. This note was created in part by the use of Dragon voice recognition system. Inadvertent grammatical errors and typographical errors may still exist.        Jude Monroy MD      CC:     No Ref-Primary, Physician  No address on file

## 2023-11-14 NOTE — PATIENT INSTRUCTIONS
Patient Next Steps:    Dr. Monroy would like you to schedule a follow-up with him after your MRI in February. Please stop at our  today to schedule this    Please call us if you have any further questions or concerns.    [facility]   Phone: [phone]  Fax: [phone]

## 2023-11-14 NOTE — NURSING NOTE
"Sarah Frost is a 63 year old female who presents for:  Chief Complaint   Patient presents with    RECHECK     3 month f/u        Initial Vitals:  BP (!) 149/85   Pulse 67   SpO2 97%  Estimated body mass index is 30.39 kg/m  as calculated from the following:    Height as of 11/9/23: 5' 11.5\" (1.816 m).    Weight as of 11/9/23: 221 lb (100.2 kg).. There is no height or weight on file to calculate BSA. BP completed using cuff size: regular  Data Unavailable    Nursing Comments:     Juan Estrella    "

## 2023-11-14 NOTE — LETTER
"    11/14/2023         RE: Sarah Frost  [id number] [facility] [id number]        Dear Colleague,    Thank you for referring your patient, Sarah Frost, to the [facility] NEUROLOG[facility]. Please see a copy of my visit note below.    NEUROSURGERY FOLLOWUP  NOTE    Sarah Frost comes today in f/u 6 months s/p 5/26/2023: Left parietal craniotomy for resection of mass.  Final pathology:A-B. 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      She is doing well since the last visit.  Patient had significant improvement in her right-sided weakness following the surgery.  She reports intermittent flashes in her left eye towards the end of the day when she is tired.  She has few such episodes with no obvious weakness, generalized tonic-clonic movement or episodes of absence seizures.  She attributes her flashes to fatigue and tiredness. She is off Keppra and off steroids.    He has MRI brain with and without contrast scheduled on 02/01/2024.  Her pituitary function labs are within normal limits.    She has also followed with a cardiologist on 11/9/2023 who recommended follow-up in a year with MRI and MRA.    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  Her seizures were manifested by right leg weakness lasting for 5 to 15 minutes.  She had total of 4 focal aware seizures from 2021 to 2023.  Her last seizure led to the discovery of left parietal meningioma which was later resected in May 2023 by Dr. Monroy.     Patient had no further seizures after the removal of the meningioma.  No other symptoms.  She was on Keppra temporarily for about a month.  She is now off Keppra.     At this time there is no need for further work-up or testing for her seizures.  No need for antiseizure " "medications at this point.\"    PHYSICAL EXAM:   Constitutional: BP (!) 149/85   Pulse 67   SpO2 97%      Motor:  Normal bulk and tone all muscle groups of upper and lower extremities.     Sensory: Sensation intact bilaterally to light touch.      Coordination:   Heel/toe/ gait intact.  Romberg's intact, tandem gait impaired      Reflexes; supinator, biceps, triceps, knee/ ankle jerk intact.  No hoffmans/   no babinski/ clonus.     Incision healed well      IMAGING:   No new images today.    8/8/2023 MRI brain with and without contrast:  MPRESSION:  1.  Exam performed for surgical/therapy planning purposes as above. Postsurgical changes as detailed.  2.  No superimposed acute intracranial process.     CONSULTATION ASSESSMENT AND PLAN:    Sarah Frost comes today in f/u 6 months s/p 5/26/2023: Left parietal craniotomy for resection of mass.  Final pathology:A-B. 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     She is doing well with intermittent flashes of light without obvious evidence of weakness, focal seizures, absence episodes, generalized tonic-clonic movements.  She attributes her flashes to being fatigued which primary happens towards the end of the day.  She reports 2-3 such episodes since her last visit.  She is independent in terms of her daily activities.      She has seen neurologist who recommended no further work-up for seizures and no need for antiseizure medications.  She also followed with her cardiologist for structural heart disease who recommended follow-up with MRI and MRI in 1 year.    I explained to the patient to follow with her neurologist if she continues to have visual flashes and we will repeat MRI if such episodes are more frequent or with any other new onset neurological symptoms.  I will follow her in another 3 months with follow-up MRI which is scheduled on February 1, 2024.     Patient " agreed with the plan.  All questions were answered and patient sounded understanding.  They can contact us if there are any further questions concerns or worsening neurological deficits.     I spent more than 20 minutes in this apt, examining the pt, reviewing the scans, reviewing notes from chart, discussing treatment options with risks and benefits and coordinating care. This note was created in part by the use of Dragon voice recognition system. Inadvertent grammatical errors and typographical errors may still exist.        Jude Monroy MD      CC:     No Ref-Primary, Physician  No address on file      Again, thank you for allowing me to participate in the care of your patient.        Sincerely,        Jude Monroy MD

## 2023-11-15 ENCOUNTER — TELEPHONE (OUTPATIENT)
Dept: CARDIOLOGY | Facility: CLINIC | Age: 63
End: 2023-11-15
Payer: COMMERCIAL

## 2023-11-15 NOTE — TELEPHONE ENCOUNTER
Patient called wanting to make sure that labs are ordered.  Cardio ordered labs also, for her  appt there.  Can she do them next Wed. When she comes in.  Hemoglobin A1C - when in hospital sugar levels were higher so cardio wanted that rechecked and also a LDL Panel and hemoglobin. Lipid to direct Lipid Panel.  Doesn't mind coming in  a day before to do labs if needed.    Irish GONZALES

## 2023-11-15 NOTE — TELEPHONE ENCOUNTER
[facility] Call Center    Phone Message    May a detailed message be left on voicemail: yes     Reason for Call: Other: Patient will like to schedule appt for MRI cardiac and MRA chest in [place]. Please call patient back to further coordinate.     Action Taken: Other: Cardiology    Travel Screening: Not Applicable    Thank you!  [facility]

## 2023-11-16 NOTE — TELEPHONE ENCOUNTER
Please advise patient that I will order labs at our OV - I can't order labs without ever seeing her.  I can order at that OV and hopefully we can add on to labs from cardiology.      Ellie Veras MBA, MS, PA-CJ  [facility]

## 2023-11-16 NOTE — TELEPHONE ENCOUNTER
Cld and LM w/patient that labs will be orderded on her OV 11/22/23 as per stated by provider.  See notes.    Irish GONZALES

## 2023-11-22 ENCOUNTER — OFFICE VISIT (OUTPATIENT)
Dept: FAMILY MEDICINE | Facility: CLINIC | Age: 63
End: 2023-11-22
Payer: COMMERCIAL

## 2023-11-22 VITALS
OXYGEN SATURATION: 96 % | HEART RATE: 74 BPM | DIASTOLIC BLOOD PRESSURE: 72 MMHG | TEMPERATURE: 96.9 F | HEIGHT: 72 IN | WEIGHT: 223.7 LBS | BODY MASS INDEX: 30.3 KG/M2 | SYSTOLIC BLOOD PRESSURE: 112 MMHG | RESPIRATION RATE: 16 BRPM

## 2023-11-22 DIAGNOSIS — Z12.31 VISIT FOR SCREENING MAMMOGRAM: ICD-10-CM

## 2023-11-22 DIAGNOSIS — Z13.0 SCREENING FOR DEFICIENCY ANEMIA: ICD-10-CM

## 2023-11-22 DIAGNOSIS — Z23 NEED FOR TDAP VACCINATION: ICD-10-CM

## 2023-11-22 DIAGNOSIS — Z23 NEED FOR VACCINATION FOR STREP PNEUMONIAE: ICD-10-CM

## 2023-11-22 DIAGNOSIS — Z12.11 SCREEN FOR COLON CANCER: ICD-10-CM

## 2023-11-22 DIAGNOSIS — Z11.59 NEED FOR HEPATITIS C SCREENING TEST: ICD-10-CM

## 2023-11-22 DIAGNOSIS — Z00.00 ROUTINE GENERAL MEDICAL EXAMINATION AT A HEALTH CARE FACILITY: Primary | ICD-10-CM

## 2023-11-22 DIAGNOSIS — Z11.4 SCREENING FOR HIV (HUMAN IMMUNODEFICIENCY VIRUS): ICD-10-CM

## 2023-11-22 DIAGNOSIS — Z13.1 SCREENING FOR DIABETES MELLITUS: ICD-10-CM

## 2023-11-22 DIAGNOSIS — D32.9 MENINGIOMA (H): ICD-10-CM

## 2023-11-22 DIAGNOSIS — Q21.0 VSD (VENTRICULAR SEPTAL DEFECT): ICD-10-CM

## 2023-11-22 DIAGNOSIS — Z13.21 ENCOUNTER FOR VITAMIN DEFICIENCY SCREENING: ICD-10-CM

## 2023-11-22 DIAGNOSIS — R73.01 IMPAIRED FASTING GLUCOSE: ICD-10-CM

## 2023-11-22 DIAGNOSIS — Q23.81 BICUSPID AORTIC VALVE: ICD-10-CM

## 2023-11-22 LAB
ALBUMIN SERPL BCG-MCNC: 4.6 G/DL (ref 3.5–5.2)
ALP SERPL-CCNC: 77 U/L (ref 40–150)
ALT SERPL W P-5'-P-CCNC: 19 U/L (ref 0–50)
ANION GAP SERPL CALCULATED.3IONS-SCNC: 10 MMOL/L (ref 7–15)
AST SERPL W P-5'-P-CCNC: 18 U/L (ref 0–45)
BILIRUB SERPL-MCNC: 0.3 MG/DL
BUN SERPL-MCNC: 14 MG/DL (ref 8–23)
CALCIUM SERPL-MCNC: 10.1 MG/DL (ref 8.8–10.2)
CHLORIDE SERPL-SCNC: 106 MMOL/L (ref 98–107)
CHOLEST SERPL-MCNC: 254 MG/DL
CREAT SERPL-MCNC: 0.87 MG/DL (ref 0.51–0.95)
DEPRECATED HCO3 PLAS-SCNC: 28 MMOL/L (ref 22–29)
EGFRCR SERPLBLD CKD-EPI 2021: 74 ML/MIN/1.73M2
ERYTHROCYTE [DISTWIDTH] IN BLOOD BY AUTOMATED COUNT: 12.8 % (ref 10–15)
GLUCOSE SERPL-MCNC: 94 MG/DL (ref 70–99)
HBA1C MFR BLD: 5.2 % (ref 0–5.6)
HCT VFR BLD AUTO: 42.8 % (ref 35–47)
HDLC SERPL-MCNC: 96 MG/DL
HGB BLD-MCNC: 14 G/DL (ref 11.7–15.7)
LDLC SERPL CALC-MCNC: 145 MG/DL
MCH RBC QN AUTO: 32.3 PG (ref 26.5–33)
MCHC RBC AUTO-ENTMCNC: 32.7 G/DL (ref 31.5–36.5)
MCV RBC AUTO: 99 FL (ref 78–100)
NONHDLC SERPL-MCNC: 158 MG/DL
PLATELET # BLD AUTO: 208 10E3/UL (ref 150–450)
POTASSIUM SERPL-SCNC: 4.7 MMOL/L (ref 3.4–5.3)
PROT SERPL-MCNC: 7.2 G/DL (ref 6.4–8.3)
RBC # BLD AUTO: 4.33 10E6/UL (ref 3.8–5.2)
SODIUM SERPL-SCNC: 144 MMOL/L (ref 135–145)
TRIGL SERPL-MCNC: 65 MG/DL
VIT B12 SERPL-MCNC: 556 PG/ML (ref 232–1245)
VIT D+METAB SERPL-MCNC: 30 NG/ML (ref 20–50)
WBC # BLD AUTO: 4.1 10E3/UL (ref 4–11)

## 2023-11-22 PROCEDURE — 86803 HEPATITIS C AB TEST: CPT | Performed by: PHYSICIAN ASSISTANT

## 2023-11-22 PROCEDURE — 80053 COMPREHEN METABOLIC PANEL: CPT | Performed by: PHYSICIAN ASSISTANT

## 2023-11-22 PROCEDURE — 99386 PREV VISIT NEW AGE 40-64: CPT | Mod: 25 | Performed by: PHYSICIAN ASSISTANT

## 2023-11-22 PROCEDURE — 36415 COLL VENOUS BLD VENIPUNCTURE: CPT | Performed by: PHYSICIAN ASSISTANT

## 2023-11-22 PROCEDURE — 80061 LIPID PANEL: CPT | Performed by: PHYSICIAN ASSISTANT

## 2023-11-22 PROCEDURE — 83036 HEMOGLOBIN GLYCOSYLATED A1C: CPT | Performed by: PHYSICIAN ASSISTANT

## 2023-11-22 PROCEDURE — 82306 VITAMIN D 25 HYDROXY: CPT | Performed by: PHYSICIAN ASSISTANT

## 2023-11-22 PROCEDURE — 90471 IMMUNIZATION ADMIN: CPT | Performed by: PHYSICIAN ASSISTANT

## 2023-11-22 PROCEDURE — 85027 COMPLETE CBC AUTOMATED: CPT | Performed by: PHYSICIAN ASSISTANT

## 2023-11-22 PROCEDURE — 82607 VITAMIN B-12: CPT | Performed by: PHYSICIAN ASSISTANT

## 2023-11-22 PROCEDURE — 90715 TDAP VACCINE 7 YRS/> IM: CPT | Performed by: PHYSICIAN ASSISTANT

## 2023-11-22 PROCEDURE — 87389 HIV-1 AG W/HIV-1&-2 AB AG IA: CPT | Performed by: PHYSICIAN ASSISTANT

## 2023-11-22 ASSESSMENT — ENCOUNTER SYMPTOMS
HEMATOCHEZIA: 0
SHORTNESS OF BREATH: 0
FREQUENCY: 0
PARESTHESIAS: 0
SORE THROAT: 0
COUGH: 0
PALPITATIONS: 0
DIZZINESS: 0
HEARTBURN: 0
CONSTIPATION: 0
EYE PAIN: 0
CHILLS: 0
ARTHRALGIAS: 0
DIARRHEA: 0
BREAST MASS: 0
MYALGIAS: 0
HEADACHES: 0
JOINT SWELLING: 0
FEVER: 0
DYSURIA: 0
ABDOMINAL PAIN: 0
WEAKNESS: 0
NERVOUS/ANXIOUS: 0
NAUSEA: 0
HEMATURIA: 0

## 2023-11-22 NOTE — PROGRESS NOTES
SUBJECTIVE:   Sarah is a 63 year old, presenting for the following:  Physical        11/22/2023     9:59 AM   Additional Questions   Roomed by Irish ISI   Accompanied by Self       Healthy Habits:     Getting at least 3 servings of Calcium per day:  Yes    Bi-annual eye exam:  Yes    Dental care twice a year:  Yes    Sleep apnea or symptoms of sleep apnea:  None    Diet:  Regular (no restrictions)    Frequency of exercise:  2-3 days/week    Duration of exercise:  30-45 minutes    Taking medications regularly:  Not Applicable    Medication side effects:  Not applicable    Additional concerns today:  No    Meningioma s/p resection 5/2023  Was experiencing focal unaware seizures since 2021 -the first occurrence that she went by ambulance to the hospital and was diagnosed with palpitations.  No head imaging was done at that time..  Her seizures were manifested by right leg weakness lasting for 5 to 15 minutes.  She had total of 4 focal aware seizures from 2021 to 2023.  Her last seizure led to the discovery of left parietal meningioma which was later resected in May 2023 by Dr. Monroy.     She is doing well since the last visit.  Patient had significant improvement in her right-sided weakness following the surgery.  She reports intermittent flashes in her left eye towards the end of the day when she is tired.  She has few such episodes with no obvious weakness, generalized tonic-clonic movement or episodes of absence seizures.  She attributes her flashes to fatigue and tiredness. She is off Keppra and off steroids.    He has MRI brain with and without contrast scheduled on 02/01/2024.  Her pituitary function labs are within normal limits.     VSD/Bicuspid aortic valve  Patient has a known VSD since she was a young child.  Did have an angiogram when she was young but nothing invasive with regard to cardiology since that time.  She had an echocardiogram done in 2004 that did reveal a bicuspid aortic valve.  She was  unaware of this diagnosis prior to this echocardiogram.  She recently had a follow-up appointment with a congenital cardiologist, Dr. Patterson on 11/9/2023.  She reviewed a recent echocardiogram that was done in her preoperative timeframe for her meningioma in May 2023 which showed a normal ejection fraction and otherwise normal echocardiogram.  No treatments were recommended but a follow-up MRI/MRA of the heart was recommended for May 2024.      Have you ever done Advance Care Planning? (For example, a Health Directive, POLST, or a discussion with a medical provider or your loved ones about your wishes): No, advance care planning information given to patient to review.  Patient declined advance care planning discussion at this time.    Social History     Tobacco Use    Smoking status: Never    Smokeless tobacco: Never   Substance Use Topics    Alcohol use: Yes     Alcohol/week: 3.0 standard drinks of alcohol     Types: 3 Standard drinks or equivalent per week     Comment: wine occasionally             11/22/2023    10:02 AM   Alcohol Use   Prescreen: >3 drinks/day or >7 drinks/week? No          No data to display              Reviewed orders with patient.  Reviewed health maintenance and updated orders accordingly - Yes  BP Readings from Last 3 Encounters:   11/22/23 112/72   11/14/23 (!) 149/85   11/09/23 137/84    Wt Readings from Last 3 Encounters:   11/22/23 101.5 kg (223 lb 11.2 oz)   11/09/23 100.2 kg (221 lb)   10/05/23 97.3 kg (214 lb 6.4 oz)                  Patient Active Problem List   Diagnosis    Brain mass    Bicuspid aortic valve - found in 2004 on echo - Dr. Patterson w/cardiology - stable echo 5/2023    Meningioma (H) s/p resection 5/2023 Dr. Monroy    VSD (ventricular septal defect) - known since birth - Dr. Patterson     Past Surgical History:   Procedure Laterality Date    ANGIOGRAM      as a child for VSD    HYSTERECTOMY TOTAL ABDOMINAL  04/2014    due to fibroids, with right oophorectomy and bilateral  salpingectomy    OPTICAL TRACKING SYSTEM CRANIOTOMY, EXCISE TUMOR, COMBINED Left 2023    Procedure: CRANIOTOMY, USING OPTICAL TRACKING SYSTEM, WITH NEOPLASM EXCISION STEALTH ASSISTED LEFT FRONTOPARIETAL CRANIOTOMY WITH RESCETION OF BRAIN TUMOR;  Surgeon: Jude Monroy MD;  Location: [facility] OR    TONSILLECTOMY      as a child       Social History     Tobacco Use    Smoking status: Never    Smokeless tobacco: Never   Substance Use Topics    Alcohol use: Yes     Alcohol/week: 3.0 standard drinks of alcohol     Types: 3 Standard drinks or equivalent per week     Comment: wine occasionally     Family History   Problem Relation Age of Onset    Breast Cancer Mother 60    Lung Cancer Mother 65        smoker -  from this at 68    Myocardial Infarction Father 62    No Known Problems Sister     Aneurysm Brother 62        brain aneurysm    Prostate Cancer Brother     Chronic Obstructive Pulmonary Disease Maternal Grandfather     Myocardial Infarction Maternal Grandfather     Breast Cancer Paternal Half-Sister 50         Current Outpatient Medications   Medication Sig Dispense Refill    multivitamin w/minerals (MULTI-VITAMIN) tablet Take 1 tablet by mouth daily         Breast Cancer Screening:  Any new diagnosis of family breast, ovarian, or bowel cancer? No    FHS-7:       2023    10:04 AM   Breast CA Risk Assessment (FHS-7)   Did any of your first-degree relatives have breast or ovarian cancer? Yes   Did any of your relatives have bilateral breast cancer? No   Did any man in your family have breast cancer? No   Did any woman in your family have breast and ovarian cancer? Yes   Did any woman in your family have breast cancer before age 50 y? Unknown   Do you have 2 or more relatives with breast and/or ovarian cancer? Yes   Do you have 2 or more relatives with breast and/or bowel cancer? No     Pertinent mammograms are reviewed under the imaging tab.    History of abnormal Pap smear: Status post benign hysterectomy.  "Health Maintenance and Surgical History updated.     Reviewed and updated as needed this visit by clinical staff   Tobacco  Allergies  Meds  Problems   Surg Hx  Fam Hx  Soc Hx        Reviewed and updated as needed this visit by Provider   Tobacco    Problems   Surg Hx  Fam Hx  Soc Hx           Review of Systems   Constitutional:  Negative for chills and fever.   HENT:  Negative for congestion, ear pain, hearing loss and sore throat.    Eyes:  Negative for pain and visual disturbance.   Respiratory:  Negative for cough and shortness of breath.    Cardiovascular:  Negative for chest pain, palpitations and peripheral edema.   Gastrointestinal:  Negative for abdominal pain, constipation, diarrhea, heartburn, hematochezia and nausea.   Breasts:  Negative for tenderness, breast mass and discharge.   Genitourinary:  Negative for dysuria, frequency, genital sores, hematuria, pelvic pain, urgency, vaginal bleeding and vaginal discharge.   Musculoskeletal:  Negative for arthralgias, joint swelling and myalgias.   Skin:  Negative for rash.   Neurological:  Negative for dizziness, weakness, headaches and paresthesias.   Psychiatric/Behavioral:  Negative for mood changes. The patient is not nervous/anxious.         OBJECTIVE:   /72   Pulse 74   Temp 96.9  F (36.1  C) (Tympanic)   Resp 16   Ht 1.816 m (5' 11.5\")   Wt 101.5 kg (223 lb 11.2 oz)   SpO2 96%   BMI 30.76 kg/m    Physical Exam  GENERAL: healthy, alert and no distress  EYES: Eyes grossly normal to inspection, PERRL and conjunctivae and sclerae normal  HENT: ear canals and TM's normal, nose and mouth without ulcers or lesions  NECK: no adenopathy, no asymmetry, masses, or scars and thyroid normal to palpation  RESP: lungs clear to auscultation - no rales, rhonchi or wheezes  BREAST: normal without masses, tenderness or nipple discharge and no palpable axillary masses or adenopathy  CV: regular rate and rhythm, normal S1 S2, no S3 or S4, no murmur, " click or rub, no peripheral edema and peripheral pulses strong  ABDOMEN: soft, nontender, no hepatosplenomegaly, no masses and bowel sounds normal  MS: no gross musculoskeletal defects noted, no edema  SKIN: no suspicious lesions or rashes  NEURO: Normal strength and tone, mentation intact and speech normal  PSYCH: mentation appears normal, affect normal/bright    Diagnostic Test Results:  Results for orders placed or performed in visit on 11/22/23   Hemoglobin A1c     Status: Normal   Result Value Ref Range    Hemoglobin A1C 5.2 0.0 - 5.6 %   CBC with platelets     Status: Normal   Result Value Ref Range    WBC Count 4.1 4.0 - 11.0 10e3/uL    RBC Count 4.33 3.80 - 5.20 10e6/uL    Hemoglobin 14.0 11.7 - 15.7 g/dL    Hematocrit 42.8 35.0 - 47.0 %    MCV 99 78 - 100 fL    MCH 32.3 26.5 - 33.0 pg    MCHC 32.7 31.5 - 36.5 g/dL    RDW 12.8 10.0 - 15.0 %    Platelet Count 208 150 - 450 10e3/uL       ASSESSMENT/PLAN:   Sarah was seen today for physical.    Diagnoses and all orders for this visit:    Routine general medical examination at a health care facility  -     PRIMARY CARE FOLLOW-UP SCHEDULING; Future  -     REVIEW OF HEALTH MAINTENANCE PROTOCOL ORDERS    Meningioma (H) s/p resection 5/2023 Dr. Monroy  Doing quite well since resection of the meningioma.  Symptoms of the focal seizures have completely resolved.  She is very happy with her progress.    Bicuspid aortic valve - found in 2004 on echo - Dr. Patterson w/cardiology - stable echo 5/2023  VSD (ventricular septal defect) - known since birth - Dr. Patterson  Follows with Dr. Patterson of cardiology.  Repeat MRI/MRA of the heart planned for May 2024.    Visit for screening mammogram  Has been approximately 10 years since her last mammogram.  Routine screening  -     MA SCREENING DIGITAL BILAT - Future  (s+30); Future    Screen for colon cancer  Patient has never had a colonoscopy.  Routine screening colonoscopy ordered today.  -     Colonoscopy Screening   "Referral; Future    Screening for HIV (human immunodeficiency virus)  Routine screening  -     HIV Antigen Antibody Combo    Need for hepatitis C screening test  Routine screening  -     Hepatitis C Screen Reflex to HCV RNA Quant and Genotype    Screening for diabetes mellitus  Routine screening  -     Comprehensive metabolic panel (BMP + Alb, Alk Phos, ALT, AST, Total. Bili, TP)    Screening for deficiency anemia  Routine screening  -     CBC with platelets    Encounter for vitamin deficiency screening  Routine screening  -     Vitamin B12  -     Vitamin D Deficiency    Need for vaccination for Strep pneumoniae  Patient due for pneumococcal vaccine.  Would like to do this at a different date so that she does not get 2 vaccines on 1 day.  Order this is a future order for her to do at her convenience.  -     PRIMARY CARE FOLLOW-UP SCHEDULING; Future  -     PNEUMOCOCCAL 20 VALENT CONJUGATE (PREVNAR 20); Future    Impaired fasting glucose  Routine screening  -     Lipid panel reflex to direct LDL Fasting  -     Hemoglobin A1c    Need for Tdap vaccination  Vaccinated today  -     TDAP 10-64Y (ADACEL,BOOSTRIX)        Patient has been advised of split billing requirements and indicates understanding: Yes      COUNSELING:  Reviewed preventive health counseling, as reflected in patient instructions       Regular exercise       Healthy diet/nutrition      BMI:   Estimated body mass index is 30.76 kg/m  as calculated from the following:    Height as of this encounter: 1.816 m (5' 11.5\").    Weight as of this encounter: 101.5 kg (223 lb 11.2 oz).   Weight management plan: Discussed healthy diet and exercise guidelines      She reports that she has never smoked. She has never used smokeless tobacco.          Ellie Veras PA-C  [facility] [place]  "

## 2023-11-24 LAB
HCV AB SERPL QL IA: NONREACTIVE
HIV 1+2 AB+HIV1 P24 AG SERPL QL IA: NONREACTIVE

## 2023-11-27 NOTE — RESULT ENCOUNTER NOTE
Sarah  I have reviewed your recent test results:    -Normal red blood cell (hgb) levels, normal white blood cell count and normal platelet levels.  -Liver and gallbladder tests are normal (ALT,AST, Alk phos, bilirubin), kidney function is normal (Cr, GFR), sodium is normal, potassium is normal, calcium is normal, glucose is normal.  -Hepatitis C antibody screen test shows no signs of a previous hepatitis C infection.  -HIV test is normal.  -Vitamin D level is normal and getting 1000 IU daily in your diet or supplements is recommended.     For additional lab test information, www.[email or web address] is an excellent reference.     If you have any questions please do not hesitate to contact our office via phone ([phone]) or MyChart.    Healthy regards,     Ellie Veras MBA, MS, PA-C  [facility]

## 2023-12-05 ENCOUNTER — ALLIED HEALTH/NURSE VISIT (OUTPATIENT)
Dept: FAMILY MEDICINE | Facility: CLINIC | Age: 63
End: 2023-12-05
Attending: PHYSICIAN ASSISTANT
Payer: COMMERCIAL

## 2023-12-05 DIAGNOSIS — Z23 NEED FOR VACCINATION FOR STREP PNEUMONIAE: ICD-10-CM

## 2023-12-05 DIAGNOSIS — Z23 NEED FOR VACCINATION: ICD-10-CM

## 2023-12-05 DIAGNOSIS — Z23 ENCOUNTER FOR IMMUNIZATION: Primary | ICD-10-CM

## 2023-12-05 PROCEDURE — 99207 PR NO CHARGE NURSE ONLY: CPT

## 2023-12-05 PROCEDURE — 90471 IMMUNIZATION ADMIN: CPT

## 2023-12-05 PROCEDURE — 90677 PCV20 VACCINE IM: CPT

## 2023-12-05 NOTE — PROGRESS NOTES
Prior to immunization administration, verified patients identity using patient s name and date of birth. Please see Immunization Activity for additional information.     Screening Questionnaire for Adult Immunization    Are you sick today?   No   Do you have allergies to medications, food, a vaccine component or latex?   No   Have you ever had a serious reaction after receiving a vaccination?   No   Do you have a long-term health problem with heart, lung, kidney, or metabolic disease (e.g., diabetes), asthma, a blood disorder, no spleen, complement component deficiency, a cochlear implant, or a spinal fluid leak?  Are you on long-term aspirin therapy?   No   Do you have cancer, leukemia, HIV/AIDS, or any other immune system problem?   No   Do you have a parent, brother, or sister with an immune system problem?   No   In the past 3 months, have you taken medications that affect  your immune system, such as prednisone, other steroids, or anticancer drugs; drugs for the treatment of rheumatoid arthritis, Crohn s disease, or psoriasis; or have you had radiation treatments?   No   Have you had a seizure, or a brain or other nervous system problem?   yes   During the past year, have you received a transfusion of blood or blood    products, or been given immune (gamma) globulin or antiviral drug?   No   For women: Are you pregnant or is there a chance you could become       pregnant during the next month?   No   Have you received any vaccinations in the past 4 weeks?   No     Immunization questionnaire answers were all negative. Cleared by Ellie Veras    I have reviewed the following standing orders: This patient is due and qualifies for the Pneumococcal vaccine.    Click here for Pneumococcal (Adult) Standing Order    I have reviewed the vaccines inclusion and exclusion criteria;No concerns regarding eligibility.     Patient instructed to remain in clinic for 15 minutes afterwards, and to report any adverse reactions.      Screening performed by Pina Guerra CMA on 12/5/2023 at 1:32 PM.

## 2023-12-14 ENCOUNTER — ANCILLARY PROCEDURE (OUTPATIENT)
Dept: MAMMOGRAPHY | Facility: CLINIC | Age: 63
End: 2023-12-14
Attending: PHYSICIAN ASSISTANT
Payer: COMMERCIAL

## 2023-12-14 DIAGNOSIS — Z12.31 VISIT FOR SCREENING MAMMOGRAM: ICD-10-CM

## 2023-12-14 PROCEDURE — 77063 BREAST TOMOSYNTHESIS BI: CPT | Mod: TC | Performed by: RADIOLOGY

## 2023-12-14 PROCEDURE — 77067 SCR MAMMO BI INCL CAD: CPT | Mod: TC | Performed by: RADIOLOGY

## 2023-12-15 ENCOUNTER — MYC MEDICAL ADVICE (OUTPATIENT)
Dept: CARDIOLOGY | Facility: CLINIC | Age: 63
End: 2023-12-15

## 2023-12-15 DIAGNOSIS — Q21.0 SEPTAL DEFECT, VENTRICULAR: Primary | ICD-10-CM

## 2023-12-15 DIAGNOSIS — Z82.49 FAMILY HISTORY OF EARLY CAD: ICD-10-CM

## 2023-12-15 DIAGNOSIS — E78.2 MIXED HYPERLIPIDEMIA: ICD-10-CM

## 2023-12-15 DIAGNOSIS — Q21.0 VSD (VENTRICULAR SEPTAL DEFECT): ICD-10-CM

## 2023-12-15 DIAGNOSIS — Q24.9 ADULT CONGENITAL HEART DISEASE: ICD-10-CM

## 2023-12-15 DIAGNOSIS — Q23.81 BICUSPID AORTIC VALVE: ICD-10-CM

## 2023-12-15 RX ORDER — ROSUVASTATIN CALCIUM 20 MG/1
20 TABLET, COATED ORAL DAILY
Qty: 90 TABLET | Refills: 3 | Status: SHIPPED | OUTPATIENT
Start: 2023-12-15 | End: 2024-07-12

## 2023-12-15 NOTE — PROGRESS NOTES
Date: 12/15/2023    Time of Call: 2:34 PM     Diagnosis:  hyperlipidemia     [ TORB ] Ordering provider: Daniel Patterson MD  Order: Crestor 20 mg daily, re check lipids in 6 weeks.     Order received by: Amanda Carbajal RN     Follow-up/additional notes: message sent to pt to update and sent to pharmacy

## 2024-01-03 NOTE — RESULT ENCOUNTER NOTE
Sarah  I have reviewed your recent labs. Here are the results:    -Mammogram was normal.  ADVISE: rechecking in 1 year.     If you have any questions please do not hesitate to contact our office via phone ([phone]) or [facility].    Healthy regards,     Ellie Veras MBA, MS, PA-C  [facility]

## 2024-02-01 ENCOUNTER — HOSPITAL ENCOUNTER (OUTPATIENT)
Dept: MRI IMAGING | Facility: CLINIC | Age: 64
Discharge: HOME | End: 2024-02-01
Attending: NEUROLOGICAL SURGERY | Admitting: NEUROLOGICAL SURGERY
Payer: COMMERCIAL

## 2024-02-01 DIAGNOSIS — Z98.890 S/P CRANIOTOMY: ICD-10-CM

## 2024-02-01 PROCEDURE — A9585 GADOBUTROL INJECTION: HCPCS | Performed by: NEUROLOGICAL SURGERY

## 2024-02-01 PROCEDURE — 255N000002 HC RX 255 OP 636: Performed by: NEUROLOGICAL SURGERY

## 2024-02-01 PROCEDURE — 70553 MRI BRAIN STEM W/O & W/DYE: CPT

## 2024-02-01 RX ORDER — GADOBUTROL 604.72 MG/ML
15 INJECTION INTRAVENOUS ONCE
Status: COMPLETED | OUTPATIENT
Start: 2024-02-01 | End: 2024-02-01

## 2024-02-01 RX ADMIN — GADOBUTROL 15 ML: 604.72 INJECTION INTRAVENOUS at 16:44

## 2024-02-05 ENCOUNTER — TELEPHONE (OUTPATIENT)
Dept: NEUROSURGERY | Facility: CLINIC | Age: 64
End: 2024-02-05
Payer: COMMERCIAL

## 2024-02-05 NOTE — TELEPHONE ENCOUNTER
Attempted to reach patient to remind them about appointment scheduled with Jude Monroy MD on 2/6/24 in our [place] location.  A voicemail was left with a call back number if the patient has questions or would like to reschedule.

## 2024-02-06 ENCOUNTER — OFFICE VISIT (OUTPATIENT)
Dept: NEUROSURGERY | Facility: CLINIC | Age: 64
End: 2024-02-06
Payer: COMMERCIAL

## 2024-02-06 VITALS
HEART RATE: 65 BPM | DIASTOLIC BLOOD PRESSURE: 92 MMHG | OXYGEN SATURATION: 96 % | SYSTOLIC BLOOD PRESSURE: 155 MMHG | WEIGHT: 223 LBS | BODY MASS INDEX: 31.22 KG/M2 | HEIGHT: 71 IN

## 2024-02-06 DIAGNOSIS — G93.89 BRAIN MASS: Primary | ICD-10-CM

## 2024-02-06 PROCEDURE — 99215 OFFICE O/P EST HI 40 MIN: CPT | Performed by: NEUROLOGICAL SURGERY

## 2024-02-06 PROCEDURE — 99417 PROLNG OP E/M EACH 15 MIN: CPT | Performed by: NEUROLOGICAL SURGERY

## 2024-02-06 ASSESSMENT — PAIN SCALES - GENERAL: PAINLEVEL: NO PAIN (0)

## 2024-02-06 NOTE — PROGRESS NOTES
"NEUROSURGERY FOLLOWUP  NOTE    Sarah Frost comes today in f/u 8 months s/p 5/26/2023: Left parietal craniotomy for resection of mass.  Final pathology:A-B. 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      She is doing well since the surgery. Patient had significant improvement in her right-sided weakness and intermittent flashes.  She reports intermittent headaches specially with strenuous activities and relieved with over-the-counter pain medications.  She is off Keppra and off steroids.     Her pituitary function labs are within normal limits.     She is following up with her neurologist for seizures and cardiologist for structural valve defect.      PHYSICAL EXAM:   Constitutional: BP (!) 155/92   Pulse 65   Ht 5' 11\" (1.803 m)   Wt 223 lb (101.2 kg)   SpO2 96%   BMI 31.10 kg/m       Mental Status: A & O in no acute distress.  Affect is appropriate.  Speech is fluent.  Recent and remote memory are intact.  Attention span and concentration are normal.     Motor: No pronator drift of upper extremity.   Normal bulk and tone all muscle groups of upper and lower extremities.       Delt Bi Tri Hand Flex/  Ext Iliopsoas Quadriceps Tibialis Anterior EHL Gastroc     C5 C6 C7 C8/T1 L2 L3 L4 L5 S1   R 5 5 5 5 5 5 5 5 5   L 5 5 5 5 5 5 5 5 5        Sensory: Sensation intact bilaterally to light touch.     Coordination:   Heel/toe/ gait intact.  Impaired tandem gait and Romberg's test     Reflexes; supinator, biceps, triceps, knee/ ankle jerk intact.  no hoffmans/   no babinski/ clonus.    IMAGING:   I personally reviewed all radiographic images and agree with the neuroradiology report of stable enhancement with no evidence of tumor recurrence and stable left falcine meningioma and sellar lesion.     2/1/2024 MRI brain with and without contrast:  IMPRESSION:  1.  Allowing for decrease in the amount of extra-axial fluid " underlying the left parietal craniotomy, the enhancement along the craniotomy appears stable. This may reflect entirely postoperative change, but is not specific and continued surveillance is   recommended to monitor for progressive neoplasm.  2.  T2/FLAIR hyperintensity has developed within the adjacent subcortical white matter. This is increased. This may reflect evolving postoperative change but is not specific.  3.  11 mm presumed meningioma along the posterior left falx is similar to prior.  4.  Some intrinsically nodular T1 hyperintense signal associated with the pituitary infundibulum is stable. 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  5.  No acute intracranial finding. No evidence for recent ischemia, intracranial hemorrhage, or hydrocephalus.        CONSULTATION ASSESSMENT AND PLAN:    Sarah Frost comes today in f/u 8 months s/p 5/26/2023: Left parietal craniotomy for resection of mass.  Final pathology:A-B. 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     Patient reports intermittent headaches especially with strenuous activities.  She denies any other neurological symptoms.  Her immediate postoperative weakness, seizures and visual flashes have resolved.  She is independent in terms of her activities of daily living.  She is off Keppra at this point.  She has seen neurologist who recommended no further work-up for seizures and no need for antiseizure medications.  She also followed with her cardiologist for structural heart disease who recommended follow-up with MRI and MRI in 1 year.     I explained patient MRI brain with and without contrast and showed her the images.  I explained to her that her resection cavity is stable and her sellar and left falcine lesions are stable as well.  She can follow with her neurologist regarding her headaches.    I will follow her in another 6 months with MRI brain with and  without contrast pituitary protocol.Patient agreed with the plan.  All the questions were answered and patient sounded understanding.  She can contact us if there are any further questions or concerns or worsening neurological deficits.I spent more than 60 minutes in this apt, examining the pt, reviewing the scans, reviewing notes from chart, discussing treatment options with risks and benefits and coordinating care. This note was created in part by the use of Dragon voice recognition system. Inadvertent grammatical errors and typographical errors may still exist.        Jude Monroy MD      CC:     Ellie Veras  [address] [place] [id number]

## 2024-02-06 NOTE — NURSING NOTE
"Sarah Frost is a 63 year old female who presents for:  Chief Complaint   Patient presents with    RECHECK     Discuss MRI results and next steps in treatment  Benign neoplasm of pituitary gland and craniopharyngeal duct (pouch) (H); S/P craniotomy  Strain near incision whilst exercising, would like to start exercising without pain/pressure          Initial Vitals:  BP (!) 155/92   Pulse 65   Ht 5' 11\" (1.803 m)   Wt 223 lb (101.2 kg)   SpO2 96%   BMI 31.10 kg/m   Estimated body mass index is 31.1 kg/m  as calculated from the following:    Height as of this encounter: 5' 11\" (1.803 m).    Weight as of this encounter: 223 lb (101.2 kg).. Body surface area is 2.25 meters squared. BP completed using cuff size: regular  Data Unavailable      Imer Bruce   "

## 2024-02-06 NOTE — LETTER
"    2/6/2024         RE: Sarah Frost  [address] [address] [address]        Dear Colleague,    Thank you for referring your patient, Sarah Frost, to the [facility]. Please see a copy of my visit note below.    NEUROSURGERY FOLLOWUP  NOTE    Sarah Frost comes today in f/u 8 months s/p 5/26/2023: Left parietal craniotomy for resection of mass.  Final pathology:A-B. 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      She is doing well since the surgery. Patient had significant improvement in her right-sided weakness and intermittent flashes.  She reports intermittent headaches specially with strenuous activities and relieved with over-the-counter pain medications.  She is off Keppra and off steroids.     Her pituitary function labs are within normal limits.     She is following up with her neurologist for seizures and cardiologist for structural valve defect.      PHYSICAL EXAM:   Constitutional: BP (!) 155/92   Pulse 65   Ht 5' 11\" (1.803 m)   Wt 223 lb (101.2 kg)   SpO2 96%   BMI 31.10 kg/m       Mental Status: A & O in no acute distress.  Affect is appropriate.  Speech is fluent.  Recent and remote memory are intact.  Attention span and concentration are normal.     Motor: No pronator drift of upper extremity.   Normal bulk and tone all muscle groups of upper and lower extremities.       Delt Bi Tri Hand Flex/  Ext Iliopsoas Quadriceps Tibialis Anterior EHL Gastroc     C5 C6 C7 C8/T1 L2 L3 L4 L5 S1   R 5 5 5 5 5 5 5 5 5   L 5 5 5 5 5 5 5 5 5        Sensory: Sensation intact bilaterally to light touch.     Coordination:   Heel/toe/ gait intact.  Impaired tandem gait and Romberg's test     Reflexes; supinator, biceps, triceps, knee/ ankle jerk intact.  no hoffmans/   no babinski/ clonus.    IMAGING:   I personally reviewed all radiographic images and agree with the " neuroradiology report of stable enhancement with no evidence of tumor recurrence and stable left falcine meningioma and sellar lesion.     2/1/2024 MRI brain with and without contrast:  IMPRESSION:  1.  Allowing for decrease in the amount of extra-axial fluid underlying the left parietal craniotomy, the enhancement along the craniotomy appears stable. This may reflect entirely postoperative change, but is not specific and continued surveillance is   recommended to monitor for progressive neoplasm.  2.  T2/FLAIR hyperintensity has developed within the adjacent subcortical white matter. This is increased. This may reflect evolving postoperative change but is not specific.  3.  11 mm presumed meningioma along the posterior left falx is similar to prior.  4.  Some intrinsically nodular T1 hyperintense signal associated with the pituitary infundibulum is stable. 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  5.  No acute intracranial finding. No evidence for recent ischemia, intracranial hemorrhage, or hydrocephalus.        CONSULTATION ASSESSMENT AND PLAN:    Sarah Frost comes today in f/u 8 months s/p 5/26/2023: Left parietal craniotomy for resection of mass.  Final pathology:A-B. 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     Patient reports intermittent headaches especially with strenuous activities.  She denies any other neurological symptoms.  Her immediate postoperative weakness, seizures and visual flashes have resolved.  She is independent in terms of her activities of daily living.  She is off Keppra at this point.  She has seen neurologist who recommended no further work-up for seizures and no need for antiseizure medications.  She also followed with her cardiologist for structural heart disease who recommended follow-up with MRI and MRI in 1 year.     I explained patient MRI brain with and without contrast and showed  her the images.  I explained to her that her resection cavity is stable and her sellar and left falcine lesions are stable as well.  She can follow with her neurologist regarding her headaches.    I will follow her in another 6 months with MRI brain with and without contrast pituitary protocol.Patient agreed with the plan.  All the questions were answered and patient sounded understanding.  She can contact us if there are any further questions or concerns or worsening neurological deficits.I spent more than 60 minutes in this apt, examining the pt, reviewing the scans, reviewing notes from chart, discussing treatment options with risks and benefits and coordinating care. This note was created in part by the use of Dragon voice recognition system. Inadvertent grammatical errors and typographical errors may still exist.        Jude Monroy MD      CC:     Ellie Veras  [address] [place] [id number]      Again, thank you for allowing me to participate in the care of your patient.        Sincerely,        Jude Monroy MD

## 2024-02-06 NOTE — PATIENT INSTRUCTIONS
Patient Next Steps:    Order placed for MRI imaging in 6 months. You can schedule at our  today([place] location only), or Central Scheduling will call you to make the appointment. If you do not hear from them within 1-2 business days you can call the numbers below.   [phone]   Follow-up after MRI to review. Please call to schedule.     Please call us if you have any further questions or concerns.    [facility]   Phone: [phone]  Fax: [phone]

## 2024-04-30 NOTE — ED NOTES
Per CT, Ordering provider called by CT to check if can do Chest Abd Pelvis CT tomorrow.  Neuro and Hospitalist providers agreed to that   room air

## 2024-06-26 ENCOUNTER — VIRTUAL VISIT (OUTPATIENT)
Dept: FAMILY MEDICINE | Facility: CLINIC | Age: 64
End: 2024-06-26
Payer: COMMERCIAL

## 2024-06-26 DIAGNOSIS — N89.8 VAGINAL IRRITATION: Primary | ICD-10-CM

## 2024-06-26 PROCEDURE — 99441 PR PHYSICIAN TELEPHONE EVALUATION 5-10 MIN: CPT | Mod: 93 | Performed by: FAMILY MEDICINE

## 2024-06-26 RX ORDER — FLUCONAZOLE 150 MG/1
150 TABLET ORAL
Qty: 3 TABLET | Refills: 0 | Status: SHIPPED | OUTPATIENT
Start: 2024-06-26 | End: 2024-07-03

## 2024-06-26 NOTE — PROGRESS NOTES
Sarah is a 64 year old who is being evaluated via a billable telephone visit.    What phone number would you like to be contacted at? [phone]  How would you like to obtain your AVS? MyChart  Originating Location (pt. Location): Home  Distant Location (provider location):  On-site    Assessment & Plan     Vaginal irritation  If not improving with diflucan recommend office visit.  - fluconazole (DIFLUCAN) 150 MG tablet  Dispense: 3 tablet; Refill: 0          Subjective   Sarah is a 64 year old, presenting for the following health issues:  Vaginal Problem        6/26/2024     2:57 PM   Additional Questions   Roomed by Matilda DELUCA MA   Accompanied by Self     History of Present Illness       Reason for visit:  Burning and itching in vaginal area  Symptom onset:  More than a month  Symptoms include:  Burning and itching  Symptom intensity:  Moderate  Symptom progression:  Staying the same  Had these symptoms before:  Yes  What makes it better:  Monastat cream    She eats 2-3 servings of fruits and vegetables daily.She consumes 0 sweetened beverage(s) daily.She exercises with enough effort to increase her heart rate 30 to 60 minutes per day.  She exercises with enough effort to increase her heart rate 4 days per week.   She is taking medications regularly.     Irritaion of vagina and itching-some improvement with monastat but not fully improved    Review of Systems  Constitutional, HEENT, cardiovascular, pulmonary, gi and gu systems are negative, except as otherwise noted.      Objective           Vitals:  No vitals were obtained today due to virtual visit.    Physical Exam   General: Alert and no distress //Respiratory: No audible wheeze, cough, or shortness of breath // Psychiatric:  Appropriate affect, tone, and pace of words        Phone call duration: 6 minutes  Signed Electronically by: MARTA NICHOLE DO

## 2024-07-09 ENCOUNTER — VIRTUAL VISIT (OUTPATIENT)
Dept: FAMILY MEDICINE | Facility: CLINIC | Age: 64
End: 2024-07-09
Payer: COMMERCIAL

## 2024-07-09 DIAGNOSIS — Z12.11 SCREEN FOR COLON CANCER: ICD-10-CM

## 2024-07-09 DIAGNOSIS — N76.0 VAGINITIS AND VULVOVAGINITIS: Primary | ICD-10-CM

## 2024-07-09 PROCEDURE — 99213 OFFICE O/P EST LOW 20 MIN: CPT | Mod: 95 | Performed by: NURSE PRACTITIONER

## 2024-07-09 RX ORDER — AMOXICILLIN 500 MG/1
TABLET, FILM COATED ORAL
COMMUNITY
Start: 2024-05-29

## 2024-07-09 NOTE — PROGRESS NOTES
"Sarah is a 64 year old who is being evaluated via a billable video visit.    How would you like to obtain your AVS? MyChart  If the video visit is dropped, the invitation should be resent by: Text to cell phone: [phone]  Will anyone else be joining your video visit? No    Assessment & Plan     Vaginitis and vulvovaginitis  Has now had 2 virtual visits for vaginitis.    Unfortunately that is not the best way to assess her symptoms.    Will have her do a self collect swab to see if we can get an accurate diagnoses for her and treat accordingly.    If this is negative given her symptoms I would recommend an in person evaluation for exam and appropriate swabs.  Could be some atrophic changes contributing.   Sarah verbalizes understanding of plan of care and is in agreement.    - Wet prep - lab collect    Screen for colon cancer  Routine screen ordered.  - Colonoscopy Screening  Referral      BMI  Estimated body mass index is 31.1 kg/m  as calculated from the following:    Height as of 2/6/24: 1.803 m (5' 11\").    Weight as of 2/6/24: 101.2 kg (223 lb).       Return in about 1 day (around 7/10/2024) for Lab only appointment.       Subjective   Sarah is a 64 year old, presenting for the following health issues:  RECHECK (Yeast infection)        7/9/2024     4:41 PM   Additional Questions   Roomed by Irish SNOWDEN   Accompanied by Self     HPI     Vaginal Symptoms  Onset/Duration: Has had on and off for months  Description:  Vaginal Discharge: none   Itching (Pruritis): YES  Burning sensation:  YES  Odor: YES  Accompanying Signs & Symptoms:  Urinary symptoms: No  Abdominal pain: No  Fever: No  History:   Sexually active: No  New Partner: No  Possibility of Pregnancy:  No  Recent antibiotic use: No  Previous vaginitis issues: YES  Precipitating or alleviating factors: None  Therapies tried and outcome: Monistat      Fluconazole oral; also has used monistat cream OTC.    Constitutional, HEENT, cardiovascular, " pulmonary, GI, , musculoskeletal, neuro, skin, endocrine and psych systems are negative, except as otherwise noted in the HPI.         Objective           Vitals:  No vitals were obtained today due to virtual visit.    Physical Exam   GENERAL: alert and no distress  EYES: Eyes grossly normal to inspection.  No discharge or erythema, or obvious scleral/conjunctival abnormalities.  RESP: No audible wheeze, cough, or visible cyanosis.    SKIN: Visible skin clear. No significant rash, abnormal pigmentation or lesions.  NEURO: Cranial nerves grossly intact.  Mentation and speech appropriate for age.  PSYCH: Appropriate affect, tone, and pace of words          Video-Visit Details    Type of service:  Video Visit   Originating Location (pt. Location): Home  Distant Location (provider location):  On-site  Platform used for Video Visit: Kya     Signed Electronically by: JOSE LUIS Hall CNP

## 2024-07-10 ENCOUNTER — LAB (OUTPATIENT)
Dept: LAB | Facility: CLINIC | Age: 64
End: 2024-07-10
Payer: COMMERCIAL

## 2024-07-10 DIAGNOSIS — N76.0 VAGINITIS AND VULVOVAGINITIS: ICD-10-CM

## 2024-07-10 LAB
CLUE CELLS: ABNORMAL
TRICHOMONAS, WET PREP: ABNORMAL
WBC'S/HIGH POWER FIELD, WET PREP: ABNORMAL
YEAST, WET PREP: ABNORMAL

## 2024-07-10 PROCEDURE — 87210 SMEAR WET MOUNT SALINE/INK: CPT

## 2024-07-10 NOTE — RESULT ENCOUNTER NOTE
Dear Sarah,    Here is a summary of your recent test results:    No signs of bacteria or yeast vaginal infections this is a neg swab.  Given you are having symptoms as we discussed I would recommend you be seen in clinic for full exam and repeat testing. This could be something else going on we can get to the bottom of for you.     For additional lab test information, labtestsonline.org is an excellent reference.    In addition, here is a list of due or overdue Health Maintenance reminders:    Colorectal Cancer Screening Never done  Zoster (Shingles) Vaccine(1 of 2) Never done  RSV VACCINE (Pregnancy & 60+)(1 - 1-dose 60+ series) Never done    Please call us at [phone] (or use mojio) to address the above recommendations if needed.    Thank you for choosing [facility].  It was an honor and a privilege to participate in your care.       Healthy regards,    Pina Avila, INDERJIT  [facility]

## 2024-07-12 ENCOUNTER — OFFICE VISIT (OUTPATIENT)
Dept: FAMILY MEDICINE | Facility: CLINIC | Age: 64
End: 2024-07-12
Payer: COMMERCIAL

## 2024-07-12 VITALS
WEIGHT: 232 LBS | OXYGEN SATURATION: 98 % | SYSTOLIC BLOOD PRESSURE: 130 MMHG | HEIGHT: 71 IN | RESPIRATION RATE: 16 BRPM | TEMPERATURE: 97.8 F | HEART RATE: 77 BPM | DIASTOLIC BLOOD PRESSURE: 84 MMHG | BODY MASS INDEX: 32.48 KG/M2

## 2024-07-12 DIAGNOSIS — N89.8 VAGINAL IRRITATION: Primary | ICD-10-CM

## 2024-07-12 DIAGNOSIS — D32.9 MENINGIOMA (H): ICD-10-CM

## 2024-07-12 DIAGNOSIS — R56.9 SEIZURES (H): ICD-10-CM

## 2024-07-12 DIAGNOSIS — B37.2 YEAST DERMATITIS: ICD-10-CM

## 2024-07-12 PROCEDURE — 99213 OFFICE O/P EST LOW 20 MIN: CPT | Performed by: NURSE PRACTITIONER

## 2024-07-12 PROCEDURE — 87210 SMEAR WET MOUNT SALINE/INK: CPT | Performed by: NURSE PRACTITIONER

## 2024-07-12 RX ORDER — NYSTATIN 100000 U/G
CREAM TOPICAL 2 TIMES DAILY
Qty: 30 G | Refills: 0 | Status: SHIPPED | OUTPATIENT
Start: 2024-07-12 | End: 2024-07-19

## 2024-07-12 RX ORDER — FLUCONAZOLE 150 MG/1
150 TABLET ORAL
Qty: 3 TABLET | Refills: 0 | Status: SHIPPED | OUTPATIENT
Start: 2024-07-12 | End: 2024-07-19

## 2024-07-12 NOTE — PROGRESS NOTES
"  Assessment & Plan     Vaginal irritation  - Wet prep - Clinic Collect    Yeast dermatitis  - fluconazole (DIFLUCAN) 150 MG tablet  Dispense: 3 tablet; Refill: 0  - nystatin (MYCOSTATIN) 488648 UNIT/GM external cream  Dispense: 30 g; Refill: 0    Seizures(H)  Meningioma(H)  S/p resection  Follows Dr. Monroy, neurology.        BMI  Estimated body mass index is 32.36 kg/m  as calculated from the following:    Height as of this encounter: 1.803 m (5' 11\").    Weight as of this encounter: 105.2 kg (232 lb).             Ron Smith is a 64 year old, presenting for the following health issues:  RECHECK (vaginitis)        7/12/2024     3:40 PM   Additional Questions   Roomed by Joselyn THOMAS CMA     History of Present Illness       Reason for visit:  Vaginal itching and burning.  Symptom onset:  More than a month  Symptoms include:  See above  Symptom intensity:  Mild  Symptom progression:  Improving  Had these symptoms before:  Yes  Has tried/received treatment for these symptoms:  Yes  Previous treatment was successful:  No  What makes it worse:  No  What makes it better:  Monistat cream    She eats 2-3 servings of fruits and vegetables daily.She consumes 0 sweetened beverage(s) daily.She exercises with enough effort to increase her heart rate 30 to 60 minutes per day.  She exercises with enough effort to increase her heart rate 4 days per week.   She is taking medications regularly.     Patient has been seen twice through virtual visits and collected a self-swab and it came back negative. Needed to be seen for symptoms she is experiencing.     Virtual Visit 07/09/2024    Vaginal Symptoms  Onset/Duration: Has had on and off for months  Description:  Vaginal Discharge: none   Itching (Pruritis): YES  Burning sensation:  YES  Odor: YES  Accompanying Signs & Symptoms:  Urinary symptoms: No  Abdominal pain: No  Fever: No  History:   Sexually active: No  New Partner: No  Possibility of Pregnancy:  No  Recent antibiotic use: " "No  Previous vaginitis issues: YES  Precipitating or alleviating factors: None  Therapies tried and outcome: Monistat      Fluconazole oral; also has used monistat cream OTC.    Seems to have gotten better past couple days-but it is still bothering her. Last  dose of fluconazole was 07/05/2024              Constitutional, HEENT, cardiovascular, pulmonary, GI, , musculoskeletal, neuro, skin, endocrine and psych systems are negative, except as otherwise noted.        Objective    /84   Pulse 77   Temp 97.8  F (36.6  C) (Tympanic)   Resp 16   Ht 1.803 m (5' 11\")   Wt 105.2 kg (232 lb)   SpO2 98%   BMI 32.36 kg/m    Body mass index is 32.36 kg/m .  Physical Exam   GENERAL: alert and no distress  NECK: no adenopathy, no asymmetry, masses, or scars  RESP: lungs clear to auscultation - no rales, rhonchi or wheezes  CV: regular rate and rhythm, normal S1 S2, no S3 or S4, no murmur, click or rub, no peripheral edema  ABDOMEN: soft, nontender, no hepatosplenomegaly, no masses and bowel sounds normal  MS: no gross musculoskeletal defects noted, no edema            Signed Electronically by: Pina Frazier CNP    "

## 2024-07-17 ENCOUNTER — TELEPHONE (OUTPATIENT)
Dept: CARDIOLOGY | Facility: CLINIC | Age: 64
End: 2024-07-17
Payer: COMMERCIAL

## 2024-07-25 ENCOUNTER — LAB (OUTPATIENT)
Dept: LAB | Facility: CLINIC | Age: 64
End: 2024-07-25
Payer: COMMERCIAL

## 2024-07-25 DIAGNOSIS — Q21.0 SEPTAL DEFECT, VENTRICULAR: ICD-10-CM

## 2024-07-25 DIAGNOSIS — Q23.81 BICUSPID AORTIC VALVE: ICD-10-CM

## 2024-07-25 DIAGNOSIS — Z82.49 FAMILY HISTORY OF EARLY CAD: ICD-10-CM

## 2024-07-25 DIAGNOSIS — Q24.9 ADULT CONGENITAL HEART DISEASE: ICD-10-CM

## 2024-07-25 DIAGNOSIS — E78.2 MIXED HYPERLIPIDEMIA: ICD-10-CM

## 2024-07-25 DIAGNOSIS — Q21.0 VSD (VENTRICULAR SEPTAL DEFECT): ICD-10-CM

## 2024-07-25 LAB
CHOLEST SERPL-MCNC: 238 MG/DL
FASTING STATUS PATIENT QL REPORTED: YES
HDLC SERPL-MCNC: 91 MG/DL
LDLC SERPL CALC-MCNC: 138 MG/DL
NONHDLC SERPL-MCNC: 147 MG/DL
TRIGL SERPL-MCNC: 47 MG/DL

## 2024-07-25 PROCEDURE — 80061 LIPID PANEL: CPT

## 2024-07-25 PROCEDURE — 36415 COLL VENOUS BLD VENIPUNCTURE: CPT

## 2024-07-29 PROBLEM — R56.9 SEIZURES (H): Status: ACTIVE | Noted: 2024-07-29

## 2024-08-06 ENCOUNTER — HOSPITAL ENCOUNTER (OUTPATIENT)
Dept: MRI IMAGING | Facility: CLINIC | Age: 64
Discharge: HOME | End: 2024-08-06
Attending: NEUROLOGICAL SURGERY | Admitting: NEUROLOGICAL SURGERY
Payer: COMMERCIAL

## 2024-08-06 DIAGNOSIS — G93.89 BRAIN MASS: ICD-10-CM

## 2024-08-06 PROCEDURE — 255N000002 HC RX 255 OP 636: Performed by: NEUROLOGICAL SURGERY

## 2024-08-06 PROCEDURE — A9585 GADOBUTROL INJECTION: HCPCS | Performed by: NEUROLOGICAL SURGERY

## 2024-08-06 PROCEDURE — 70553 MRI BRAIN STEM W/O & W/DYE: CPT

## 2024-08-06 RX ORDER — GADOBUTROL 604.72 MG/ML
10 INJECTION INTRAVENOUS ONCE
Status: COMPLETED | OUTPATIENT
Start: 2024-08-06 | End: 2024-08-06

## 2024-08-06 RX ADMIN — GADOBUTROL 10 ML: 604.72 INJECTION INTRAVENOUS at 14:07

## 2024-08-08 ENCOUNTER — TELEPHONE (OUTPATIENT)
Dept: GASTROENTEROLOGY | Facility: CLINIC | Age: 64
End: 2024-08-08
Payer: COMMERCIAL

## 2024-08-08 NOTE — TELEPHONE ENCOUNTER
"Endoscopy Scheduling Screen    Have you had a positive Covid test in the last 14 days?  No    What is your communication preference for Instructions and/or Bowel Prep?   MyChart    What insurance is in the chart?  Other:      Ordering/Referring Provider:     VONNIE ABDI      (If ordering provider performs procedure, schedule with ordering provider unless otherwise instructed. )    BMI: Estimated body mass index is 32.36 kg/m  as calculated from the following:    Height as of 7/12/24: 1.803 m (5' 11\").    Weight as of 7/12/24: 105.2 kg (232 lb).     Sedation Ordered  moderate sedation.   If patient BMI > 50 do not schedule in [facility].    If patient BMI > 45 do not schedule at [facility].    Are you taking methadone or Suboxone?  No    Have you had difficulties, pain, or discomfort during past endoscopy procedures?  No    Are you taking any prescription medications for pain 3 or more times per week?   NO, No RN review required.    Do you have a history of malignant hyperthermia?  No    (Females) Are you currently pregnant?        Have you been diagnosed or told you have pulmonary hypertension?   No    Do you have an LVAD?  No    Have you been told you have moderate to severe sleep apnea?  No    Have you been told you have COPD, asthma, or any other lung disease?  No    Do you have any heart conditions?  Yes     In the past year, have you had any hospitalizations for heart related issues including cardiomyopathy, heart attack, or stent placement?  No    Do you have any implantable devices in your body (pacemaker, ICD)?  No    Do you take nitroglycerine?  No    Have you ever had or are you waiting for an organ transplant?  No. Continue scheduling, no site restrictions.    Have you had a stroke or transient ischemic attack (TIA aka \"mini stroke\" in the last 6 months?   No    Have you been diagnosed with or been told you have cirrhosis of the liver?   No    Are you currently on dialysis?   No    Do you need assistance " "transferring?   No    BMI: Estimated body mass index is 32.36 kg/m  as calculated from the following:    Height as of 7/12/24: 1.803 m (5' 11\").    Weight as of 7/12/24: 105.2 kg (232 lb).     Is patients BMI > 40 and scheduling location [facility]?  No    Do you take an injectable medication for weight loss or diabetes (excluding insulin)?  No    Do you take the medication Naltrexone?  No    Do you take blood thinners?  No       Prep   Are you currently on dialysis or do you have chronic kidney disease?  No    Do you have a diagnosis of diabetes?  No    Do you have a diagnosis of cystic fibrosis (CF)?  No    On a regular basis do you go 3 -5 days between bowel movements?  No    BMI > 40?  No    Preferred Pharmacy:    [id number] - [facility] [address] AT [place] [address] & [address]  [facility] [address]  Phone: [phone] Fax: [phone]      Final Scheduling Details     Procedure scheduled  Colonoscopy    Surgeon:  XOCHITL     Date of procedure:  10/4     Pre-OP / PAC:   No - Not required for this site.    Location  [facility] - Per order.    Sedation   Moderate Sedation - Per order.      Patient Reminders:   You will receive a call from a Nurse to review instructions and health history.  This assessment must be completed prior to your procedure.  Failure to complete the Nurse assessment may result in the procedure being cancelled.      On the day of your procedure, please designate an adult(s) who can drive you home stay with you for the next 24 hours. The medicines used in the exam will make you sleepy. You will not be able to drive.      You cannot take public transportation, ride share services, or non-medical taxi service without a responsible caregiver.  Medical transport services are allowed with the requirement that a responsible caregiver will receive you at your destination.  We require that drivers and caregivers are confirmed prior to your procedure.  "

## 2024-08-13 ENCOUNTER — OFFICE VISIT (OUTPATIENT)
Dept: NEUROSURGERY | Facility: CLINIC | Age: 64
End: 2024-08-13
Payer: COMMERCIAL

## 2024-08-13 VITALS — SYSTOLIC BLOOD PRESSURE: 141 MMHG | HEART RATE: 68 BPM | DIASTOLIC BLOOD PRESSURE: 88 MMHG | OXYGEN SATURATION: 95 %

## 2024-08-13 DIAGNOSIS — D32.9 MENINGIOMA (H): Primary | ICD-10-CM

## 2024-08-13 PROCEDURE — 99213 OFFICE O/P EST LOW 20 MIN: CPT | Performed by: NURSE PRACTITIONER

## 2024-08-13 ASSESSMENT — PAIN SCALES - GENERAL: PAINLEVEL: NO PAIN (0)

## 2024-08-13 NOTE — NURSING NOTE
"Sarah Frost is a 64 year old female who presents for:  Chief Complaint   Patient presents with    RECHECK      F/U -  Review MRI, pt states doing well, numbness in right foot still         Initial Vitals:  BP (!) 141/88   Pulse 68   SpO2 95%  Estimated body mass index is 32.36 kg/m  as calculated from the following:    Height as of 7/12/24: 5' 11\" (1.803 m).    Weight as of 7/12/24: 232 lb (105.2 kg).. There is no height or weight on file to calculate BSA. BP completed using cuff size: regular  No Pain (0)    Nursing Comments:       Maya Cornelius    "

## 2024-08-13 NOTE — PROGRESS NOTES
Neurosurgery Clinic neurosurgical follow up-[place]    Assessment:  Sarah comes into clinic today approximately 15 months after a left parietal craniotomy for resection of meningioma.  Angiomatosis and microcytic histologic subtypes (CNS World Health Organization grade 1).  This was done May 26, 2023 with Dr. Monroy.  Patient has had multiple postoperative appointments with Dr. Monroy with follow-up MRIs assessing progression of meningiomas.  Patient states she is doing well and is optimistic about her MRI results.    Exam:  Patient is alert and oriented with nonfocal exam.  Previous right-sided weakness after surgery has all but improved.    Plan:  -MRI brain with and without contrast in 12 months    Surveillance scans have been unremarkable up to this point.  No evidence of progression of surgically resected meningioma.  Posterior falx presumed meningioma is also stable in size after multiple scans.  Because of the stability and likely slow-growing nature of previous meningioma patient is able to follow-up in 1 years time with surveillance MRI brain with appointment after to review results.    Andrew Salcido, HELIO  [address] [address]    Tel [phone]    Dragon Disclosure   This was created at least in part with a voice recognition software. Mistakes/typos may be present.

## 2024-08-13 NOTE — LETTER
8/13/2024      Sarah Frost  [address] [facility] [address]      Dear Colleague,    Thank you for referring your patient, Sarah rFost, to the [facility]UR[facility]. Please see a copy of my visit note below.    Neurosurgery Clinic neurosurgical follow up-[place]    Assessment:  Sarah comes into clinic today approximately 15 months after a left parietal craniotomy for resection of meningioma.  Angiomatosis and microcytic histologic subtypes (CNS World Health Organization grade 1).  This was done May 26, 2023 with Dr. Monroy.  Patient has had multiple postoperative appointments with Dr. Monroy with follow-up MRIs assessing progression of meningiomas.  Patient states she is doing well and is optimistic about her MRI results.    Exam:  Patient is alert and oriented with nonfocal exam.  Previous right-sided weakness after surgery has all but improved.    Plan:  -MRI brain with and without contrast in 12 months    Surveillance scans have been unremarkable up to this point.  No evidence of progression of surgically resected meningioma.  Posterior falx presumed meningioma is also stable in size after multiple scans.  Because of the stability and likely slow-growing nature of previous meningioma patient is able to follow-up in 1 years time with surveillance MRI brain with appointment after to review results.    Andrew Salcido DNP  [address] [address]    Tel [phone]    Dragon Disclosure   This was created at least in part with a voice recognition software. Mistakes/typos may be present.          Again, thank you for allowing me to participate in the care of your patient.        Sincerely,        Andrew Salcido, CNP

## 2024-09-13 NOTE — TELEPHONE ENCOUNTER
Standard Miralax Bowel Prep recommended due to standard bowel prep. Instructions were sent via Posiba.

## 2024-09-25 ENCOUNTER — TELEPHONE (OUTPATIENT)
Dept: GASTROENTEROLOGY | Facility: CLINIC | Age: 64
End: 2024-09-25
Payer: COMMERCIAL

## 2024-09-25 NOTE — TELEPHONE ENCOUNTER
Pre visit planning completed.      Procedure details:    Patient scheduled for Colonoscopy on 10.04.2024.     Arrival time: 1130. Procedure time 1215    Facility location: [facility]; [address] [place] [address]. Check in location: Main entrance, door #1 on the North side of the building under roundabout awning. DO NOT GO TO SURGERY/ED ENTRANCE.     Sedation type: Conscious sedation     Pre op exam needed? No.    Indication for procedure: Screen for colon cancer       Chart review:     Electronic implanted devices? No    Recent diagnosis of diverticulitis within the last 6 weeks? No      Medication review:    Diabetic? No    Anticoagulants? No    Weight loss medication/injectable? No GLP-1 medication per patient's medication list.  RN will verify with pre-assessment call.    Other medication HOLDING recommendations:  N/A      Prep for procedure:     Bowel prep recommendation: Standard Miralax  Due to: standard bowel prep.    Prep instructions sent via DJZ         Raven Chaudhry RN  Endoscopy Procedure Pre Assessment RN  [phone] option 2

## 2024-09-25 NOTE — TELEPHONE ENCOUNTER
Attempted to contact patient in order to complete pre assessment questions.     No answer. Left message to return call to [phone] option 2    Callback required communication sent via KARALIT.      Raven Chaudhry RN  Endoscopy Procedure Pre Assessment

## 2024-09-26 NOTE — TELEPHONE ENCOUNTER
Pre assessment completed for upcoming procedure.   (Please see previous telephone encounter notes for complete details)    Procedure details:    Arrival time and facility location reviewed.    Pre op exam needed? No.    Designated  policy reviewed. Instructed to have someone stay 6 hours post procedure.     COVID policy reviewed.      Medication review:    Medications reviewed. Please see supporting documentation below. Holding recommendations discussed (if applicable).       Prep for procedure:     Procedure prep instructions reviewed.        Additional information needed?  N/A      Patient  verbalized understanding and had no questions or concerns at this time.      Corinne Kliber, RN  Endoscopy Procedure Pre Assessment   [phone] option 4

## 2024-10-01 NOTE — TELEPHONE ENCOUNTER
Received incoming call from patient.    Patient stated that they accidentally had wild rice soup last night when eating out and they realized that they could not have that. Writer looked at prep and saw that it was standard Miralax prep and informed patient that they were ok to have that last night as their low fiber diet began this morning.     Patient also inquired about canned peas and writer stated they could have those.    Patient verbalized understanding and had no further questions.    Queenie Ly RN  Endoscopy Procedure Pre Assessment RN  [phone] option 4

## 2024-10-04 ENCOUNTER — HOSPITAL ENCOUNTER (OUTPATIENT)
Facility: CLINIC | Age: 64
Discharge: HOME | End: 2024-10-04
Attending: INTERNAL MEDICINE | Admitting: INTERNAL MEDICINE
Payer: COMMERCIAL

## 2024-10-04 VITALS
WEIGHT: 230 LBS | HEIGHT: 72 IN | HEART RATE: 68 BPM | RESPIRATION RATE: 16 BRPM | OXYGEN SATURATION: 96 % | BODY MASS INDEX: 31.15 KG/M2 | DIASTOLIC BLOOD PRESSURE: 82 MMHG | SYSTOLIC BLOOD PRESSURE: 129 MMHG

## 2024-10-04 LAB — COLONOSCOPY: NORMAL

## 2024-10-04 PROCEDURE — 88305 TISSUE EXAM BY PATHOLOGIST: CPT | Mod: TC | Performed by: INTERNAL MEDICINE

## 2024-10-04 PROCEDURE — 88305 TISSUE EXAM BY PATHOLOGIST: CPT | Mod: 26 | Performed by: PATHOLOGY

## 2024-10-04 PROCEDURE — 45380 COLONOSCOPY AND BIOPSY: CPT | Performed by: INTERNAL MEDICINE

## 2024-10-04 PROCEDURE — G0500 MOD SEDAT ENDO SERVICE >5YRS: HCPCS | Performed by: INTERNAL MEDICINE

## 2024-10-04 PROCEDURE — 99153 MOD SED SAME PHYS/QHP EA: CPT | Performed by: INTERNAL MEDICINE

## 2024-10-04 PROCEDURE — 250N000011 HC RX IP 250 OP 636: Performed by: INTERNAL MEDICINE

## 2024-10-04 RX ORDER — ONDANSETRON 2 MG/ML
4 INJECTION INTRAMUSCULAR; INTRAVENOUS
Status: DISCONTINUED | OUTPATIENT
Start: 2024-10-04 | End: 2024-10-04 | Stop reason: HOSPADM

## 2024-10-04 RX ORDER — DIPHENHYDRAMINE HYDROCHLORIDE 50 MG/ML
25-50 INJECTION INTRAMUSCULAR; INTRAVENOUS
Status: DISCONTINUED | OUTPATIENT
Start: 2024-10-04 | End: 2024-10-04 | Stop reason: HOSPADM

## 2024-10-04 RX ORDER — ATROPINE SULFATE 0.1 MG/ML
1 INJECTION INTRAVENOUS
Status: DISCONTINUED | OUTPATIENT
Start: 2024-10-04 | End: 2024-10-04 | Stop reason: HOSPADM

## 2024-10-04 RX ORDER — NALOXONE HYDROCHLORIDE 0.4 MG/ML
0.2 INJECTION, SOLUTION INTRAMUSCULAR; INTRAVENOUS; SUBCUTANEOUS
Status: DISCONTINUED | OUTPATIENT
Start: 2024-10-04 | End: 2024-10-04 | Stop reason: HOSPADM

## 2024-10-04 RX ORDER — PROCHLORPERAZINE MALEATE 10 MG
10 TABLET ORAL EVERY 6 HOURS PRN
Status: DISCONTINUED | OUTPATIENT
Start: 2024-10-04 | End: 2024-10-04 | Stop reason: HOSPADM

## 2024-10-04 RX ORDER — ONDANSETRON 4 MG/1
4 TABLET, ORALLY DISINTEGRATING ORAL EVERY 6 HOURS PRN
Status: DISCONTINUED | OUTPATIENT
Start: 2024-10-04 | End: 2024-10-04 | Stop reason: HOSPADM

## 2024-10-04 RX ORDER — ONDANSETRON 2 MG/ML
4 INJECTION INTRAMUSCULAR; INTRAVENOUS EVERY 6 HOURS PRN
Status: DISCONTINUED | OUTPATIENT
Start: 2024-10-04 | End: 2024-10-04 | Stop reason: HOSPADM

## 2024-10-04 RX ORDER — NALOXONE HYDROCHLORIDE 0.4 MG/ML
0.4 INJECTION, SOLUTION INTRAMUSCULAR; INTRAVENOUS; SUBCUTANEOUS
Status: DISCONTINUED | OUTPATIENT
Start: 2024-10-04 | End: 2024-10-04 | Stop reason: HOSPADM

## 2024-10-04 RX ORDER — FENTANYL CITRATE 50 UG/ML
50-100 INJECTION, SOLUTION INTRAMUSCULAR; INTRAVENOUS EVERY 5 MIN PRN
Status: DISCONTINUED | OUTPATIENT
Start: 2024-10-04 | End: 2024-10-04 | Stop reason: HOSPADM

## 2024-10-04 RX ORDER — EPINEPHRINE 1 MG/ML
0.1 INJECTION, SOLUTION, CONCENTRATE INTRAVENOUS
Status: DISCONTINUED | OUTPATIENT
Start: 2024-10-04 | End: 2024-10-04 | Stop reason: HOSPADM

## 2024-10-04 RX ORDER — LIDOCAINE 40 MG/G
CREAM TOPICAL
Status: DISCONTINUED | OUTPATIENT
Start: 2024-10-04 | End: 2024-10-04 | Stop reason: HOSPADM

## 2024-10-04 RX ORDER — SIMETHICONE 40MG/0.6ML
133 SUSPENSION, DROPS(FINAL DOSAGE FORM)(ML) ORAL
Status: DISCONTINUED | OUTPATIENT
Start: 2024-10-04 | End: 2024-10-04 | Stop reason: HOSPADM

## 2024-10-04 RX ORDER — FLUMAZENIL 0.1 MG/ML
0.2 INJECTION, SOLUTION INTRAVENOUS
Status: DISCONTINUED | OUTPATIENT
Start: 2024-10-04 | End: 2024-10-04 | Stop reason: HOSPADM

## 2024-10-04 RX ADMIN — FENTANYL CITRATE 100 MCG: 50 INJECTION, SOLUTION INTRAMUSCULAR; INTRAVENOUS at 12:14

## 2024-10-04 RX ADMIN — MIDAZOLAM 2 MG: 1 INJECTION INTRAMUSCULAR; INTRAVENOUS at 12:13

## 2024-10-04 RX ADMIN — FENTANYL CITRATE 50 MCG: 50 INJECTION, SOLUTION INTRAMUSCULAR; INTRAVENOUS at 12:25

## 2024-10-04 RX ADMIN — MIDAZOLAM 1 MG: 1 INJECTION INTRAMUSCULAR; INTRAVENOUS at 12:24

## 2024-10-04 ASSESSMENT — ACTIVITIES OF DAILY LIVING (ADL)
ADLS_ACUITY_SCORE: 38
ADLS_ACUITY_SCORE: 38

## 2024-10-04 NOTE — H&P
Pre-Endoscopy History and Physical     Sarah Frost MRN# [id number]   YOB: 1960 Age: 64 year old     Date of Procedure: 10/4/2024  Primary care provider: Ellie Veras  Type of Endoscopy: Colonoscopy with possible biopsy, possible polypectomy  Reason for Procedure: screen  Type of Anesthesia Anticipated: Conscious Sedation    HPI:    Sarah is a 64 year old female who will be undergoing the above procedure.      A history and physical has been performed. The patient's medications and allergies have been reviewed. The risks and benefits of the procedure and the sedation options and risks were discussed with the patient.  All questions were answered and informed consent was obtained.      She denies a personal or family history of anesthesia complications or bleeding disorders.     Patient Active Problem List   Diagnosis    Brain mass    Bicuspid aortic valve - found in 2004 on echo - Dr. Patterson w/cardiology - stable echo 5/2023    Meningioma (H) s/p resection 5/2023 Dr. Monroy    VSD (ventricular septal defect) - known since birth - Dr. Patterson    Seizures (H)        Past Medical History:   Diagnosis Date    Congenital anomaly of heart valve         Past Surgical History:   Procedure Laterality Date    ANGIOGRAM      as a child for VSD    HYSTERECTOMY TOTAL ABDOMINAL  04/2014    due to fibroids, with right oophorectomy and bilateral salpingectomy    OPTICAL TRACKING SYSTEM CRANIOTOMY, EXCISE TUMOR, COMBINED Left 05/26/2023    Procedure: CRANIOTOMY, USING OPTICAL TRACKING SYSTEM, WITH NEOPLASM EXCISION STEALTH ASSISTED LEFT FRONTOPARIETAL CRANIOTOMY WITH RESCETION OF BRAIN TUMOR;  Surgeon: Jude Monroy MD;  Location: [facility] OR    TONSILLECTOMY      as a child       Social History     Tobacco Use    Smoking status: Never     Passive exposure: Never    Smokeless tobacco: Never   Substance Use Topics    Alcohol use: Yes     Alcohol/week: 3.0 standard drinks of alcohol     Types: 3 Standard drinks or  "equivalent per week     Comment: wine occasionally       Family History   Problem Relation Age of Onset    Breast Cancer Mother 60    Lung Cancer Mother 65        smoker -  from this at 68    Myocardial Infarction Father 62    Chronic Obstructive Pulmonary Disease Maternal Grandfather     Myocardial Infarction Maternal Grandfather     Aneurysm Brother 62        brain aneurysm    Prostate Cancer Brother     No Known Problems Sister     Breast Cancer Paternal Half-Sister 50    Colon Cancer No family hx of        Prior to Admission medications    Medication Sig Start Date End Date Taking? Authorizing Provider   amoxicillin (AMOXIL) 500 MG tablet TAKE 4 TABLETS BY MOUTH 30 TO 60 MINUTES BEFORE PROCEDURE 24   Reported, Patient   multivitamin w/minerals (MULTI-VITAMIN) tablet Take 1 tablet by mouth daily    Reported, Patient       No Known Allergies     REVIEW OF SYSTEMS:   5 point ROS negative except as noted above in HPI, including Gen., Resp., CV, GI &  system review.    PHYSICAL EXAM:   Ht 1.816 m (5' 11.5\")   Wt 104.3 kg (230 lb)   BMI 31.63 kg/m   Estimated body mass index is 31.63 kg/m  as calculated from the following:    Height as of this encounter: 1.816 m (5' 11.5\").    Weight as of this encounter: 104.3 kg (230 lb).   GENERAL APPEARANCE: alert, and oriented  MENTAL STATUS: alert  AIRWAY EXAM: Mallampatti Class I (visualization of the soft palate, fauces, uvula, anterior and posterior pillars)  RESP: lungs clear to auscultation - no rales, rhonchi or wheezes  CV: regular rates and rhythm  DIAGNOSTICS:    Not indicated    IMPRESSION   ASA Class 2 - Mild systemic disease    PLAN:   Plan for Colonoscopy with possible biopsy, possible polypectomy. We discussed the risks, benefits and alternatives and the patient wished to proceed.    The above has been forwarded to the consulting provider.      Signed Electronically by: Isac Moise MD  2024          "

## 2024-10-07 LAB
PATH REPORT.COMMENTS IMP SPEC: NORMAL
PATH REPORT.COMMENTS IMP SPEC: NORMAL
PATH REPORT.FINAL DX SPEC: NORMAL
PATH REPORT.GROSS SPEC: NORMAL
PATH REPORT.MICROSCOPIC SPEC OTHER STN: NORMAL
PATH REPORT.RELEVANT HX SPEC: NORMAL
PHOTO IMAGE: NORMAL

## 2024-10-30 ENCOUNTER — TELEPHONE (OUTPATIENT)
Dept: CARDIOLOGY | Facility: CLINIC | Age: 64
End: 2024-10-30
Payer: COMMERCIAL

## 2024-10-30 DIAGNOSIS — F40.240 CLAUSTROPHOBIA: Primary | ICD-10-CM

## 2024-10-30 NOTE — TELEPHONE ENCOUNTER
[facility] Call Center    Phone Message    May a detailed message be left on voicemail: yes    Reason for Call: Patient called requesting to speak with a member of her care team in regards to an upcoming MRA. Would like clarification as to what will be happening and to go over prep. Please call back to further discuss.    Thank you!  [facility]

## 2024-10-31 RX ORDER — LORAZEPAM 1 MG/1
1 TABLET ORAL
Status: SHIPPED
Start: 2024-10-31

## 2024-10-31 NOTE — TELEPHONE ENCOUNTER
10/31/2024 1023    Diagnosis:  claustrophbia     [ TORB ] Ordering provider: GIULIANA Patterson MD    Order: Ativan 1 mg x1 30-60 minutes prior to imaging. May repeat dose if needed. Dispense 2 tabs with zeo refills     Order received by: Amanda Carbajal RN      Follow-up/additional notes: called into pharmacy. Dicussed with patient for MRI/MRA tomorrow.

## 2024-11-01 ENCOUNTER — HOSPITAL ENCOUNTER (OUTPATIENT)
Dept: CARDIOLOGY | Facility: CLINIC | Age: 64
Discharge: HOME | End: 2024-11-01
Attending: INTERNAL MEDICINE
Payer: COMMERCIAL

## 2024-11-01 VITALS — SYSTOLIC BLOOD PRESSURE: 124 MMHG | DIASTOLIC BLOOD PRESSURE: 97 MMHG

## 2024-11-01 DIAGNOSIS — Q23.81 BICUSPID AORTIC VALVE: ICD-10-CM

## 2024-11-01 DIAGNOSIS — Q24.9 ADULT CONGENITAL HEART DISEASE: ICD-10-CM

## 2024-11-01 DIAGNOSIS — Q21.0 SEPTAL DEFECT, VENTRICULAR: ICD-10-CM

## 2024-11-01 PROCEDURE — 75565 CARD MRI VELOC FLOW MAPPING: CPT | Mod: 26 | Performed by: INTERNAL MEDICINE

## 2024-11-01 PROCEDURE — 75561 CARDIAC MRI FOR MORPH W/DYE: CPT | Mod: 26 | Performed by: INTERNAL MEDICINE

## 2024-11-01 PROCEDURE — 75561 CARDIAC MRI FOR MORPH W/DYE: CPT

## 2024-11-01 PROCEDURE — 71555 MRI ANGIO CHEST W OR W/O DYE: CPT | Mod: 26 | Performed by: INTERNAL MEDICINE

## 2024-11-01 PROCEDURE — 71555 MRI ANGIO CHEST W OR W/O DYE: CPT

## 2024-11-01 PROCEDURE — 255N000002 HC RX 255 OP 636: Performed by: INTERNAL MEDICINE

## 2024-11-01 PROCEDURE — A9585 GADOBUTROL INJECTION: HCPCS | Performed by: INTERNAL MEDICINE

## 2024-11-01 RX ORDER — DIAZEPAM 5 MG/1
5 TABLET ORAL EVERY 30 MIN PRN
Status: DISCONTINUED | OUTPATIENT
Start: 2024-11-01 | End: 2024-11-02 | Stop reason: HOSPADM

## 2024-11-01 RX ORDER — DIPHENHYDRAMINE HCL 25 MG
25 CAPSULE ORAL
Status: DISCONTINUED | OUTPATIENT
Start: 2024-11-01 | End: 2024-11-02 | Stop reason: HOSPADM

## 2024-11-01 RX ORDER — DIPHENHYDRAMINE HYDROCHLORIDE 50 MG/ML
25-50 INJECTION INTRAMUSCULAR; INTRAVENOUS
Status: DISCONTINUED | OUTPATIENT
Start: 2024-11-01 | End: 2024-11-02 | Stop reason: HOSPADM

## 2024-11-01 RX ORDER — LIDOCAINE 40 MG/G
CREAM TOPICAL
Status: DISCONTINUED | OUTPATIENT
Start: 2024-11-01 | End: 2024-11-02 | Stop reason: HOSPADM

## 2024-11-01 RX ORDER — GADOBUTROL 604.72 MG/ML
20 INJECTION INTRAVENOUS ONCE
Status: COMPLETED | OUTPATIENT
Start: 2024-11-01 | End: 2024-11-01

## 2024-11-01 RX ORDER — METHYLPREDNISOLONE SODIUM SUCCINATE 125 MG/2ML
125 INJECTION INTRAMUSCULAR; INTRAVENOUS
Status: DISCONTINUED | OUTPATIENT
Start: 2024-11-01 | End: 2024-11-02 | Stop reason: HOSPADM

## 2024-11-01 RX ORDER — NITROGLYCERIN 0.4 MG/1
0.4 TABLET SUBLINGUAL EVERY 5 MIN PRN
Status: DISCONTINUED | OUTPATIENT
Start: 2024-11-01 | End: 2024-11-01 | Stop reason: HOSPADM

## 2024-11-01 RX ORDER — LORAZEPAM 0.5 MG/1
0.5 TABLET ORAL EVERY 30 MIN PRN
Status: DISCONTINUED | OUTPATIENT
Start: 2024-11-01 | End: 2024-11-02 | Stop reason: HOSPADM

## 2024-11-01 RX ADMIN — GADOBUTROL 20 ML: 604.72 INJECTION INTRAVENOUS at 15:58

## 2024-11-10 NOTE — ANESTHESIA POSTPROCEDURE EVALUATION
Patient: Sarah Frost    Procedure: Procedure(s):  CRANIOTOMY, USING OPTICAL TRACKING SYSTEM, WITH NEOPLASM EXCISION STEALTH ASSISTED LEFT FRONTOPARIETAL CRANIOTOMY WITH RESCETION OF BRAIN TUMOR       Anesthesia Type:  General    Note:  Disposition: Inpatient   Postop Pain Control: Uneventful            Sign Out: Well controlled pain   PONV: No   Neuro/Psych: Uneventful            Sign Out: Acceptable/Baseline neuro status   Airway/Respiratory: Uneventful            Sign Out: Acceptable/Baseline resp. status   CV/Hemodynamics: Uneventful            Sign Out: Acceptable CV status; No obvious hypovolemia; No obvious fluid overload   Other NRE: NONE   DID A NON-ROUTINE EVENT OCCUR? No           Last vitals:  Vitals Value Taken Time   /76 05/26/23 1530   Temp 36.7  C (98  F) 05/26/23 1515   Pulse 70 05/26/23 1530   Resp 15 05/26/23 1530   SpO2 95 % 05/26/23 1535       Electronically Signed By: Austin Bravo MD  June 11, 2023  6:31 PM  
Musculoskeletal Pain  Musculoskeletal pain refers to aches and pains in your bones, joints, muscles, and the tissues that surround them. This pain can occur in any part of the body. It can last for a short time (acute) or a long time (chronic).    A physical exam, lab tests, and imaging studies may be done to find the cause of your musculoskeletal pain.    Follow these instructions at home:  Lifestyle    Try to control or lower your stress levels. Stress increases muscle tension and can worsen musculoskeletal pain. It is important to recognize when you are anxious or stressed and learn ways to manage it. This may include:  Meditation or yoga.  Cognitive or behavioral therapy.  Acupuncture or massage therapy.  You may continue all activities unless the activities cause more pain. When the pain gets better, slowly resume your normal activities. Gradually increase the intensity and duration of your activities or exercise.  Managing pain, stiffness, and swelling        Treatment may include medicines for pain and inflammation that are taken by mouth or applied to the skin. Take over-the-counter and prescription medicines only as told by your health care provider.  When your pain is severe, bed rest may be helpful. Lie or sit in any position that is comfortable, but get out of bed and walk around at least every couple of hours.  If directed, apply heat to the affected area as often as told by your health care provider. Use the heat source that your health care provider recommends, such as a moist heat pack or a heating pad.  Place a towel between your skin and the heat source.  Leave the heat on for 20–30 minutes.  Remove the heat if your skin turns bright red. This is especially important if you are unable to feel pain, heat, or cold. You may have a greater risk of getting burned.  If directed, put ice on the painful area. To do this:  Put ice in a plastic bag.  Place a towel between your skin and the bag.  Leave the ice on for 20 minutes, 2–3 times a day.  Remove the ice if your skin turns bright red. This is very important. If you cannot feel pain, heat, or cold, you have a greater risk of damage to the area.  General instructions    Your health care provider may recommend that you see a physical therapist. This person can help you come up with a safe exercise program.  If told by your health care provider, do physical therapy exercises to improve movement and strength in the affected area.  Keep all follow-up visits. This is important. This includes any physical therapy visits.  Contact a health care provider if:  Your pain gets worse.  Medicines do not help ease your pain.  You cannot use the part of your body that hurts, such as your arm, leg, or neck.  You have trouble sleeping.  You have trouble doing your normal activities.  Get help right away if:  You have a new injury and your pain is worse or different.  You feel numb or you have tingling in the painful area.  Summary  Musculoskeletal pain refers to aches and pains in your bones, joints, muscles, and the tissues that surround them.  This pain can occur in any part of the body.  Your health care provider may recommend that you see a physical therapist. This person can help you come up with a safe exercise program. Do any exercises as told by your physical therapist.  Lower your stress level. Stress can worsen musculoskeletal pain. Ways to lower stress may include meditation, yoga, cognitive or behavioral therapy, acupuncture, and massage therapy.  This information is not intended to replace advice given to you by your health care provider. Make sure you discuss any questions you have with your health care provider.     ======      Fall Prevention    AMBULATORY CARE:    Fall prevention includes ways to make your home and other areas safer. Prevention also includes ways you can move more carefully to prevent a fall. Health conditions that cause changes in your blood pressure, vision, or muscle strength and coordination may increase your risk for falls. Medicines may also increase your risk for falls if they make you dizzy, weak, or sleepy.    Arrange to have someone call your local emergency number (911 in the [place]) if:    You have fallen and are found unconscious.    You have fallen and cannot move part of your body.  Call your doctor if:    You have fallen and have pain or a headache.    You have questions or concerns about your condition or care.  Fall prevention tips:    Stand or sit up slowly. This may help you keep your balance and prevent falls.    Use assistive devices as directed. Your healthcare provider may suggest that you use a cane or walker to help you keep your balance. You may need to have grab bars put in your bathroom near the toilet or in the shower.    Wear shoes that fit well and have soles that . Wear shoes both inside and outside. Use slippers with good . Do not wear shoes with high heels.    Stay active. Exercise can help strengthen your muscles and improve your balance. Your healthcare provider may recommend water aerobics or walking. He or she may also recommend physical therapy to improve your coordination. Never start an exercise program without talking to your healthcare provider first.  D[employer] Walking for Exercise      Manage your medical conditions. Keep all appointments with your healthcare providers. Visit your eye doctor as directed.  Home safety tips:  Fall Prevention for Adults    Wear a personal alarm. This is a device that allows you to call for help if you fall. Ask your healthcare provider for more information.    Add items to prevent falls in the bathroom. Put nonslip strips on your bath or shower floor to prevent you from slipping. Use a bath mat if you do not have carpet in the bathroom. This will prevent you from falling when you step out of the bath or shower. Use a shower seat so you do not need to stand while you shower. Sit on the toilet or a chair in your bathroom to dry yourself and put on clothing. This will prevent you from losing your balance from drying or dressing yourself while you are standing.    Keep paths clear. Remove books, shoes, and other objects from walkways and stairs. Place cords for telephones and lamps out of the way so that you do not need to walk over them. Tape them down if you cannot move them. Remove small rugs. If you cannot remove a rug, secure it with double-sided tape. This will prevent you from tripping.    Install bright lights in your home. Use night lights to help light paths to the bathroom or kitchen. Always turn on the light before you start walking.    Keep items you use often on shelves within reach. Do not use a step stool to help you reach an item.    Paint or place reflective tape on the edges of your stairs. This will help you see the stairs better.  Plan ahead in case you do fall: Talk with family members, friends, and neighbors to create a fall plan. Someone will need to call for emergency help if you are injured or found unconscious. If possible, keep a mobile phone with you at all times, or wear an emergency alert device. You can contact emergency services by pressing a button on the device. Ask your healthcare provider for more information.    Follow up with your doctor as directed: Write down your questions so you remember to ask them during your visits.

## 2024-11-21 ENCOUNTER — LAB (OUTPATIENT)
Dept: LAB | Facility: CLINIC | Age: 64
End: 2024-11-21
Payer: COMMERCIAL

## 2024-11-21 ENCOUNTER — DOCUMENTATION ONLY (OUTPATIENT)
Dept: FAMILY MEDICINE | Facility: CLINIC | Age: 64
End: 2024-11-21

## 2024-11-21 DIAGNOSIS — Z13.0 SCREENING FOR DEFICIENCY ANEMIA: ICD-10-CM

## 2024-11-21 DIAGNOSIS — Z13.1 SCREENING FOR DIABETES MELLITUS: ICD-10-CM

## 2024-11-21 DIAGNOSIS — Q23.81 BICUSPID AORTIC VALVE: Primary | ICD-10-CM

## 2024-11-21 DIAGNOSIS — E78.2 MIXED HYPERLIPIDEMIA: ICD-10-CM

## 2024-11-21 DIAGNOSIS — D32.9 MENINGIOMA (H): ICD-10-CM

## 2024-11-21 DIAGNOSIS — Z13.29 SCREENING FOR THYROID DISORDER: ICD-10-CM

## 2024-11-21 DIAGNOSIS — Q23.81 BICUSPID AORTIC VALVE: ICD-10-CM

## 2024-11-21 LAB
ALBUMIN SERPL BCG-MCNC: 4.3 G/DL (ref 3.5–5.2)
ALP SERPL-CCNC: 88 U/L (ref 40–150)
ALT SERPL W P-5'-P-CCNC: 17 U/L (ref 0–50)
ANION GAP SERPL CALCULATED.3IONS-SCNC: 13 MMOL/L (ref 7–15)
AST SERPL W P-5'-P-CCNC: 19 U/L (ref 0–45)
BILIRUB SERPL-MCNC: 0.4 MG/DL
BUN SERPL-MCNC: 12.6 MG/DL (ref 8–23)
CALCIUM SERPL-MCNC: 10.1 MG/DL (ref 8.8–10.4)
CHLORIDE SERPL-SCNC: 104 MMOL/L (ref 98–107)
CHOLEST SERPL-MCNC: 237 MG/DL
CREAT SERPL-MCNC: 0.95 MG/DL (ref 0.51–0.95)
EGFRCR SERPLBLD CKD-EPI 2021: 67 ML/MIN/1.73M2
ERYTHROCYTE [DISTWIDTH] IN BLOOD BY AUTOMATED COUNT: 12.7 % (ref 10–15)
FASTING STATUS PATIENT QL REPORTED: YES
FASTING STATUS PATIENT QL REPORTED: YES
GLUCOSE SERPL-MCNC: 103 MG/DL (ref 70–99)
HCO3 SERPL-SCNC: 24 MMOL/L (ref 22–29)
HCT VFR BLD AUTO: 42.3 % (ref 35–47)
HDLC SERPL-MCNC: 92 MG/DL
HGB BLD-MCNC: 14.1 G/DL (ref 11.7–15.7)
LDLC SERPL CALC-MCNC: 129 MG/DL
MCH RBC QN AUTO: 33 PG (ref 26.5–33)
MCHC RBC AUTO-ENTMCNC: 33.3 G/DL (ref 31.5–36.5)
MCV RBC AUTO: 99 FL (ref 78–100)
NONHDLC SERPL-MCNC: 145 MG/DL
PLATELET # BLD AUTO: 196 10E3/UL (ref 150–450)
POTASSIUM SERPL-SCNC: 4.4 MMOL/L (ref 3.4–5.3)
PROT SERPL-MCNC: 7 G/DL (ref 6.4–8.3)
RBC # BLD AUTO: 4.27 10E6/UL (ref 3.8–5.2)
SODIUM SERPL-SCNC: 141 MMOL/L (ref 135–145)
TRIGL SERPL-MCNC: 81 MG/DL
TSH SERPL DL<=0.005 MIU/L-ACNC: 3.53 UIU/ML (ref 0.3–4.2)
WBC # BLD AUTO: 3.8 10E3/UL (ref 4–11)

## 2024-11-25 SDOH — HEALTH STABILITY: PHYSICAL HEALTH: ON AVERAGE, HOW MANY DAYS PER WEEK DO YOU ENGAGE IN MODERATE TO STRENUOUS EXERCISE (LIKE A BRISK WALK)?: 4 DAYS

## 2024-11-25 SDOH — HEALTH STABILITY: PHYSICAL HEALTH: ON AVERAGE, HOW MANY MINUTES DO YOU ENGAGE IN EXERCISE AT THIS LEVEL?: 50 MIN

## 2024-11-25 ASSESSMENT — SOCIAL DETERMINANTS OF HEALTH (SDOH): HOW OFTEN DO YOU GET TOGETHER WITH FRIENDS OR RELATIVES?: TWICE A WEEK

## 2024-11-26 ENCOUNTER — OFFICE VISIT (OUTPATIENT)
Dept: FAMILY MEDICINE | Facility: CLINIC | Age: 64
End: 2024-11-26
Attending: PHYSICIAN ASSISTANT
Payer: COMMERCIAL

## 2024-11-26 VITALS
HEART RATE: 79 BPM | WEIGHT: 242 LBS | TEMPERATURE: 97.3 F | SYSTOLIC BLOOD PRESSURE: 124 MMHG | BODY MASS INDEX: 34.65 KG/M2 | OXYGEN SATURATION: 95 % | DIASTOLIC BLOOD PRESSURE: 81 MMHG | RESPIRATION RATE: 14 BRPM | HEIGHT: 70 IN

## 2024-11-26 DIAGNOSIS — D32.9 MENINGIOMA (H): ICD-10-CM

## 2024-11-26 DIAGNOSIS — Q23.81 BICUSPID AORTIC VALVE: ICD-10-CM

## 2024-11-26 DIAGNOSIS — R73.01 IMPAIRED FASTING GLUCOSE: ICD-10-CM

## 2024-11-26 DIAGNOSIS — Z12.31 VISIT FOR SCREENING MAMMOGRAM: ICD-10-CM

## 2024-11-26 DIAGNOSIS — B07.8 COMMON WART: ICD-10-CM

## 2024-11-26 DIAGNOSIS — E66.811 OBESITY, CLASS I, BMI 30.0-34.9 (SEE ACTUAL BMI): ICD-10-CM

## 2024-11-26 DIAGNOSIS — D72.819 LEUKOPENIA, UNSPECIFIED TYPE: ICD-10-CM

## 2024-11-26 DIAGNOSIS — B37.2 YEAST DERMATITIS: ICD-10-CM

## 2024-11-26 DIAGNOSIS — Z85.828 HISTORY OF SQUAMOUS CELL CARCINOMA OF SKIN: ICD-10-CM

## 2024-11-26 DIAGNOSIS — Z00.00 ROUTINE GENERAL MEDICAL EXAMINATION AT A HEALTH CARE FACILITY: Primary | ICD-10-CM

## 2024-11-26 DIAGNOSIS — Q21.0 VSD (VENTRICULAR SEPTAL DEFECT): ICD-10-CM

## 2024-11-26 PROBLEM — G93.89 BRAIN MASS: Status: RESOLVED | Noted: 2023-05-23 | Resolved: 2024-11-26

## 2024-11-26 PROBLEM — R56.9 SEIZURES (H): Status: RESOLVED | Noted: 2024-07-29 | Resolved: 2024-11-26

## 2024-11-26 LAB
EST. AVERAGE GLUCOSE BLD GHB EST-MCNC: 108 MG/DL
HBA1C MFR BLD: 5.4 % (ref 0–5.6)

## 2024-11-26 PROCEDURE — 99213 OFFICE O/P EST LOW 20 MIN: CPT | Mod: 25 | Performed by: PHYSICIAN ASSISTANT

## 2024-11-26 PROCEDURE — 17110 DESTRUCTION B9 LES UP TO 14: CPT | Performed by: PHYSICIAN ASSISTANT

## 2024-11-26 PROCEDURE — 99396 PREV VISIT EST AGE 40-64: CPT | Performed by: PHYSICIAN ASSISTANT

## 2024-11-26 RX ORDER — NYSTATIN 100000 U/G
CREAM TOPICAL 2 TIMES DAILY
Qty: 30 G | Refills: 3 | Status: SHIPPED | OUTPATIENT
Start: 2024-11-26

## 2024-11-26 NOTE — PROGRESS NOTES
Preventive Care Visit  [facility]IOR [place]  Ellie Victorina Veras PA-C, Family Medicine  Nov 26, 2024      Assessment & Plan     Routine general medical examination at a health care facility  - PRIMARY CARE FOLLOW-UP SCHEDULING  - REVIEW OF HEALTH MAINTENANCE PROTOCOL ORDERS  - PRIMARY CARE FOLLOW-UP SCHEDULING    Meningioma (H) s/p resection 5/2023 Dr. Monroy  Doing well.  Following with neurosurgery annually.  Next follow-up August 2025.    Bicuspid aortic valve - found in 2004 on echo - Dr. Patterson w/cardiology - stable echo 5/2023  VSD (ventricular septal defect) - known since birth - Dr. Patterson  Recent cardiac MRI.  Follow-up with cardiology scheduled 12/17/2024.    Impaired fasting glucose  Obesity, Class I, BMI 30.0-34.9 (see actual BMI)  Normal hemoglobin A1c.  Encouraged diet and exercise efforts.  Recommended the book the obesity code.  Patient does not have any interest in pharmaceutical options at this point in time.  - Hemoglobin A1c    History of squamous cell carcinoma of skin - Left posterior thigh, s/p excision 04/2016  Follows with dermatology annually.  Due April 2025   Leukopenia, unspecified type  Unclear etiology.  Likely viral infection or baseline at the low side of normal.  Can recheck in a few months with a lab only if she desires  - CBC with platelets and differential    Visit for screening mammogram  Routine screening  - MA Screening Bilateral w/ Ten    Yeast dermatitis  Discussed a few treatment options that may help with decreasing recurrence.  Topical nystatin refilled today.  - nystatin (MYCOSTATIN) 375414 UNIT/GM external cream  Dispense: 30 g; Refill: 3    Common wart  Treated with cryotherapy today.  Aftercare discussed in detail  - DESTRUCT BENIGN LESION, UP TO 14      Patient has been advised of split billing requirements and indicates understanding: Yes        BMI  Estimated body mass index is 34.26 kg/m  as calculated from the following:    Height as of this  "encounter: 1.79 m (5' 10.47\").    Weight as of this encounter: 109.8 kg (242 lb).   Weight management plan: Discussed healthy diet and exercise guidelines    Counseling  Appropriate preventive services were addressed with this patient via screening, questionnaire, or discussion as appropriate for fall prevention, nutrition, physical activity, Tobacco-use cessation, social engagement, weight loss and cognition.  Checklist reviewing preventive services available has been given to the patient.  Reviewed patient's diet, addressing concerns and/or questions.       Return in about 3 weeks (around 12/17/2024) for Cardiology.      Ellie Veras MBA, MS, PA-C  [facility] [place]      Ron Smith is a 64 year old, presenting for the following:  Physical        11/26/2024    10:04 AM   Additional Questions   Roomed by Jose SNOWDEN   Accompanied by Self          HPI      Yeast infections  For the last year has been struggling with recurrent yeast infections of the external vaginal area approximately every other month.  Nystatin cream seems to help.  Feels that she has gained weight and this may be contributing.  Is traveling a lot more and thinks that sugar intake has been increased somewhat    Right knee bump  In last month noticed growth on back of Right knee  - rough, raised,  --- had cancer on back of left knee thinks similar.  Had squamous cell carcinoma removed on left posterior thigh in 2016.  Follows with dermatology annually.  No new lesions since that time.    Meningioma s/p resection 5/2023  Was experiencing focal unaware seizures since 2021 -the first occurrence that she went by ambulance to the hospital and was diagnosed with palpitations.  No head imaging was done at that time..  Her seizures were manifested by right leg weakness lasting for 5 to 15 minutes.  She had total of 4 focal aware seizures from 2021 to 2023.  Her last seizure led to the discovery of left parietal meningioma which " was later resected in May 2023 by Dr. Monroy.      Patient had significant improvement in her right-sided weakness following the surgery.  She reports intermittent flashes in her left eye towards the end of the day when she is tired.  She has few such episodes with no obvious weakness, generalized tonic-clonic movement or episodes of absence seizures.  She attributes her flashes to fatigue and tiredness. She is off Keppra and off steroids.     Last MRI stable 8/6/2024.  Her pituitary function labs are within normal limits 2023.    Note from 8/13/2024: Surveillance scans have been unremarkable up to this point. No evidence of progression of surgically resected meningioma. Posterior falx presumed meningioma is also stable in size after multiple scans. Because of the stability and likely slow-growing nature of previous meningioma patient is able to follow-up in 1 years time with surveillance MRI brain with appointment after to review results.      VSD/Bicuspid aortic valve  Patient has a known VSD since she was a young child.  Did have an angiogram when she was young but nothing invasive with regard to cardiology since that time.  She had an echocardiogram done in 2004 that did reveal a bicuspid aortic valve.  She was unaware of this diagnosis prior to this echocardiogram.  She recently had a follow-up appointment with a congenital cardiologist, Dr. Patterson on 11/9/2023.  She reviewed a recent echocardiogram that was done in her preoperative timeframe for her meningioma in May 2023 which showed a normal ejection fraction and otherwise normal echocardiogram.  No treatments were recommended but a follow-up MRI/MRA of the heart was recommended & completed 11/1/2024.  Cardiology follow up 12/17/2024.      Health Care Directive  Patient does not have a Health Care Directive: Patient states has Advance Directive and will bring in a copy to clinic.      11/25/2024   General Health   How would you rate your overall physical  health? Good   Feel stress (tense, anxious, or unable to sleep) Only a little      (!) STRESS CONCERN      11/25/2024   Nutrition   Three or more servings of calcium each day? Yes   Diet: Regular (no restrictions)   How many servings of fruit and vegetables per day? (!) 2-3   How many sweetened beverages each day? 0-1            11/25/2024   Exercise   Days per week of moderate/strenous exercise 4 days   Average minutes spent exercising at this level 50 min            11/25/2024   Social Factors   Frequency of gathering with friends or relatives Twice a week   Worry food won't last until get money to buy more No   Food not last or not have enough money for food? No   Do you have housing? (Housing is defined as stable permanent housing and does not include staying ouside in a car, in a tent, in an abandoned building, in an overnight shelter, or couch-surfing.) Yes   Are you worried about losing your housing? No   Lack of transportation? No   Unable to get utilities (heat,electricity)? No            11/25/2024   Fall Risk   Fallen 2 or more times in the past year? No    Trouble with walking or balance? No        Patient-reported          11/25/2024   Dental   Dentist two times every year? Yes            11/25/2024   TB Screening   Were you born outside of the [place]? No              Today's PHQ-2 Score:       2/5/2024     2:50 PM   PHQ-2 ( 1999 [employer])   Q1: Little interest or pleasure in doing things 0    Q2: Feeling down, depressed or hopeless 0    PHQ-2 Score 0   Q1: Little interest or pleasure in doing things Not at all   Q2: Feeling down, depressed or hopeless Not at all   PHQ-2 Score 0       Patient-reported         11/25/2024   Substance Use   Alcohol more than 3/day or more than 7/wk No   Do you use any other substances recreationally? No        Social History     Tobacco Use    Smoking status: Never     Passive exposure: Never    Smokeless tobacco: Never   Vaping Use    Vaping status: Never Used   Substance Use  Topics    Alcohol use: Yes     Alcohol/week: 3.0 standard drinks of alcohol     Types: 3 Standard drinks or equivalent per week     Comment: wine occasionally    Drug use: Never           2023   LAST FHS-7 RESULTS   1st degree relative breast or ovarian cancer No   Any relative bilateral breast cancer No   Any male have breast cancer No   Any ONE woman have BOTH breast AND ovarian cancer No   Any woman with breast cancer before 50yrs No   2 or more relatives with breast AND/OR ovarian cancer Yes   2 or more relatives with breast AND/OR bowel cancer No                   2024   STI Screening   New sexual partner(s) since last STI/HIV test? No        History of abnormal Pap smear: Status post hysterectomy with removal of cervix and no history of CIN2 or greater or cervical cancer. Health Maintenance and Surgical History updated.       ASCVD Risk   The 10-year ASCVD risk score (Dede SULLIVAN, et al., 2019) is: 4.1%    Values used to calculate the score:      Age: 64 years      Sex: Female      Is Non- : No      Diabetic: No      Tobacco smoker: No      Systolic Blood Pressure: 124 mmHg      Is BP treated: No      HDL Cholesterol: 92 mg/dL      Total Cholesterol: 237 mg/dL    Fracture Risk Assessment Tool  Link to Frax Calculator  Use the information below to complete the Frax calculator  : 1960  Sex: female  Weight (kg): 109.8 kg (actual weight)  Height (cm): 179 cm  Previous Fragility Fracture:  No  History of parent with fractured hip:  No  Current Smoking:  No  Patient has been on glucocorticoids for more than 3 months (5mg/day or more): No  Rheumatoid Arthritis on Problem List:  No  Secondary Osteoporosis on Problem List:  No  Consumes 3 or more units of alcohol per day: No  Femoral Neck BMD (g/cm2)           Reviewed and updated as needed this visit by Provider   Tobacco  Allergies  Meds  Problems  Med Hx  Surg Hx  Fam Hx  Soc   Hx Sexual Activity       "    Past Medical History:   Diagnosis Date    Congenital anomaly of heart valve      Past Surgical History:   Procedure Laterality Date    ANGIOGRAM      as a child for VSD    COLONOSCOPY N/A 10/04/2024    Procedure: Colonoscopy with polypectomy by cold biopsy forceps;  Surgeon: Isac Moise MD;  Location: [facility]    HYSTERECTOMY TOTAL ABDOMINAL  04/2014    due to fibroids, with right oophorectomy and bilateral salpingectomy    OPTICAL TRACKING SYSTEM CRANIOTOMY, EXCISE TUMOR, COMBINED Left 05/26/2023    Procedure: CRANIOTOMY, USING OPTICAL TRACKING SYSTEM, WITH NEOPLASM EXCISION STEALTH ASSISTED LEFT FRONTOPARIETAL CRANIOTOMY WITH RESCETION OF BRAIN TUMOR;  Surgeon: Jude Monroy MD;  Location: [facility] OR    TONSILLECTOMY      as a child         Review of Systems  Constitutional, HEENT, cardiovascular, pulmonary, GI, , musculoskeletal, neuro, skin, endocrine and psych systems are negative, except as otherwise noted.     Objective    Exam  /81 (BP Location: Right arm, Patient Position: Chair, Cuff Size: Adult Large)   Pulse 79   Temp 97.3  F (36.3  C) (Tympanic)   Resp 14   Ht 1.79 m (5' 10.47\")   Wt 109.8 kg (242 lb)   SpO2 95%   BMI 34.26 kg/m     Estimated body mass index is 34.26 kg/m  as calculated from the following:    Height as of this encounter: 1.79 m (5' 10.47\").    Weight as of this encounter: 109.8 kg (242 lb).    Physical Exam  GENERAL: alert and no distress  EYES: Eyes grossly normal to inspection, PERRL and conjunctivae and sclerae normal  HENT: ear canals and TM's normal, nose and mouth without ulcers or lesions  NECK: no adenopathy, no asymmetry, masses, or scars  RESP: lungs clear to auscultation - no rales, rhonchi or wheezes  BREAST: normal without masses, tenderness or nipple discharge and no palpable axillary masses or adenopathy  CV: regular rate and rhythm, normal S1 S2, no S3 or S4, no murmur, click or rub, no peripheral edema  ABDOMEN: soft, nontender, no hepatosplenomegaly, " no masses and bowel sounds normal  MS: no gross musculoskeletal defects noted, no edema  SKIN: no suspicious lesions or rashes, in the right popliteal fossa 6  millimeter hyperkeratotic raised skin lesion consistent with verruca  NEURO: Normal strength and tone, mentation intact and speech normal  PSYCH: mentation appears normal, affect normal/bright    PROCEDURE:  After discussing the risks, benefits and alternatives to cryotherapy the patient elected to proceed with this procedure.  The lesion was frozen with liquid nitrogen x 3.  The patient tolerated this procedure well and there were no immediate adverse effects.  Aftercare was reviewed with the patient in detail.    Signed Electronically by: Ellie Veras PA-C

## 2024-11-26 NOTE — PATIENT INSTRUCTIONS
The Obesity Code - Dr. Kenneth Simmons    Patient Education   Preventive Care Advice   This is general advice given by our system to help you stay healthy. However, your care team may have specific advice just for you. Please talk to your care team about your preventive care needs.  Nutrition  Eat 5 or more servings of fruits and vegetables each day.  Try wheat bread, brown rice and whole grain pasta (instead of white bread, rice, and pasta).  Get enough calcium and vitamin D. Check the label on foods and aim for 100% of the RDA (recommended daily allowance).  Lifestyle  Exercise at least 150 minutes each week  (30 minutes a day, 5 days a week).  Do muscle strengthening activities 2 days a week. These help control your weight and prevent disease.  No smoking.  Wear sunscreen to prevent skin cancer.  Have a dental exam and cleaning every 6 months.  Yearly exams  See your health care team every year to talk about:  Any changes in your health.  Any medicines your care team has prescribed.  Preventive care, family planning, and ways to prevent chronic diseases.  Shots (vaccines)   HPV shots (up to age 26), if you've never had them before.  Hepatitis B shots (up to age 59), if you've never had them before.  COVID-19 shot: Get this shot when it's due.  Flu shot: Get a flu shot every year.  Tetanus shot: Get a tetanus shot every 10 years.  Pneumococcal, hepatitis A, and RSV shots: Ask your care team if you need these based on your risk.  Shingles shot (for age 50 and up)  General health tests  Diabetes screening:  Starting at age 35, Get screened for diabetes at least every 3 years.  If you are younger than age 35, ask your care team if you should be screened for diabetes.  Cholesterol test: At age 39, start having a cholesterol test every 5 years, or more often if advised.  Bone density scan (DEXA): At age 50, ask your care team if you should have this scan for osteoporosis (brittle bones).  Hepatitis C: Get tested at least  once in your life.  STIs (sexually transmitted infections)  Before age 24: Ask your care team if you should be screened for STIs.  After age 24: Get screened for STIs if you're at risk. You are at risk for STIs (including HIV) if:  You are sexually active with more than one person.  You don't use condoms every time.  You or a partner was diagnosed with a sexually transmitted infection.  If you are at risk for HIV, ask about PrEP medicine to prevent HIV.  Get tested for HIV at least once in your life, whether you are at risk for HIV or not.  Cancer screening tests  Cervical cancer screening: If you have a cervix, begin getting regular cervical cancer screening tests starting at age 21.  Breast cancer scan (mammogram): If you've ever had breasts, begin having regular mammograms starting at age 40. This is a scan to check for breast cancer.  Colon cancer screening: It is important to start screening for colon cancer at age 45.  Have a colonoscopy test every 10 years (or more often if you're at risk) Or, ask your provider about stool tests like a FIT test every year or Cologuard test every 3 years.  To learn more about your testing options, visit:   .  For help making a decision, visit:   https://bit.ly/nc28863.  Prostate cancer screening test: If you have a prostate, ask your care team if a prostate cancer screening test (PSA) at age 55 is right for you.  Lung cancer screening: If you are a current or former smoker ages 50 to 80, ask your care team if ongoing lung cancer screenings are right for you.  For informational purposes only. Not to replace the advice of your health care provider. Copyright   2023 [employer]. All rights reserved. Clinically reviewed by the [facility] Transitions Program. [employer] [id number] - REV 01/24.

## 2024-12-17 ENCOUNTER — HOSPITAL ENCOUNTER (OUTPATIENT)
Dept: MAMMOGRAPHY | Facility: CLINIC | Age: 64
Discharge: HOME | End: 2024-12-17
Attending: PHYSICIAN ASSISTANT
Payer: COMMERCIAL

## 2024-12-17 DIAGNOSIS — Z12.31 VISIT FOR SCREENING MAMMOGRAM: ICD-10-CM

## 2024-12-17 PROCEDURE — 77063 BREAST TOMOSYNTHESIS BI: CPT

## 2024-12-17 PROCEDURE — 77067 SCR MAMMO BI INCL CAD: CPT

## 2024-12-18 NOTE — PROGRESS NOTES
CARDIOLOGY CONSULTATION:    Ms Frost is a 64 yr old female with a past medical history significant for VSD, bicuspid valve with moderate stenosis, PVCs and recent diagnosis in May 2023 of left parietal meningioma for which she underwent resection in May of 2023.  She has focal aware seizures with intermittent right leg weakness, for which she is followed by neurology.   At the time of her hospitalization she was seen by my colleague Dr. Gilbert, who cleared her for surgery, and recommended follow up in [facility] clinic. I met her in 2023 initially and she is here for follow up.     Echo 2023 showed a small restrictive membranous ventricular septal defect with small left to right ventricular shunt.  She had mild to moderate valvular aortic stenosis in the setting of a bicuspid aortic valve with a mean gradient of 19 mmHg.  Her ascending aorta and aortic root were no dilated on echo (ascending 3.7 CM) and she had a normal EF at 60%. She underwent a cardiac MRI/MRA as part of her evaluation 2024 and her aortic root measured 4.1 CM and her QP:Qs was 1.04     Has had an impaired fasting blood sugar in the past so we checked an A1C and it was normal.  She has an elevated cholesterol but not requiring statins at this time; she is working on improving her diet.  She was a vegetarian for 8 years.   She does have a family history of early CAD.     She has 2 daughters and 7 siblings and none of them have been screened for BAV but she told them they need to be.  Also need the echo to screen for aneurysms.     She is retired physical therapist.  Her sister had a child born with d-transposition and  shortly after birth as it was not recognized initially.  She continues to take antibiotics before the dentist.  She has good control of her BP. No symptoms of concern.        PAST MEDICAL HISTORY:  Past Medical History:   Diagnosis Date    Congenital anomaly of heart valve        CURRENT MEDICATIONS:  Current  Outpatient Medications   Medication Sig Dispense Refill    amoxicillin (AMOXIL) 500 MG tablet TAKE 4 TABLETS BY MOUTH 30 TO 60 MINUTES BEFORE PROCEDURE      multivitamin w/minerals (MULTI-VITAMIN) tablet Take 1 tablet by mouth daily      nystatin (MYCOSTATIN) 777310 UNIT/GM external cream Apply topically 2 times daily. 30 g 3       PAST SURGICAL HISTORY:  Past Surgical History:   Procedure Laterality Date    ANGIOGRAM      as a child for VSD    COLONOSCOPY N/A 10/04/2024    Procedure: Colonoscopy with polypectomy by cold biopsy forceps;  Surgeon: Isac Moise MD;  Location: [facility]    HYSTERECTOMY TOTAL ABDOMINAL  2014    due to fibroids, with right oophorectomy and bilateral salpingectomy    OPTICAL TRACKING SYSTEM CRANIOTOMY, EXCISE TUMOR, COMBINED Left 2023    Procedure: CRANIOTOMY, USING OPTICAL TRACKING SYSTEM, WITH NEOPLASM EXCISION STEALTH ASSISTED LEFT FRONTOPARIETAL CRANIOTOMY WITH RESCETION OF BRAIN TUMOR;  Surgeon: Jude Monroy MD;  Location: [facility]    TONSILLECTOMY      as a child       ALLERGIES  Patient has no known allergies.    FAMILY HX:  Family History   Problem Relation Age of Onset    Breast Cancer Mother 60    Lung Cancer Mother 65        smoker -  from this at 68    Myocardial Infarction Father 62    Substance Abuse Sister     Cerebral aneurysm Brother 62        brain aneurysm    Mental Illness Brother     Substance Abuse Brother     Prostate Cancer Brother     Substance Abuse Brother     Chronic Obstructive Pulmonary Disease Maternal Grandfather     Myocardial Infarction Maternal Grandfather     No Known Problems Paternal Half-Sister     Breast Cancer Paternal Half-Sister 50    Breast Cancer Paternal Half-Sister 50    Colon Cancer No family hx of        SOCIAL HX:  Social History     Socioeconomic History    Marital status:      Spouse name: Austin    Number of children: 2   Occupational History    Occupation: Retired     Comment: PT Assistant - [employer]   Tobacco Use     Smoking status: Never     Passive exposure: Never    Smokeless tobacco: Never   Vaping Use    Vaping status: Never Used   Substance and Sexual Activity    Alcohol use: Yes     Alcohol/week: 3.0 standard drinks of alcohol     Types: 3 Standard drinks or equivalent per week     Comment: wine occasionally    Drug use: Never    Sexual activity: Not Currently     Partners: Male     Birth control/protection: Female Surgical   Other Topics Concern    Parent/sibling w/ CABG, MI or angioplasty before 65F 55M? Yes     Social Drivers of Health     Financial Resource Strain: Low Risk  (11/25/2024)    Financial Resource Strain     Within the past 12 months, have you or your family members you live with been unable to get utilities (heat, electricity) when it was really needed?: No   Food Insecurity: Low Risk  (11/25/2024)    Food Insecurity     Within the past 12 months, did you worry that your food would run out before you got money to buy more?: No     Within the past 12 months, did the food you bought just not last and you didn t have money to get more?: No   Transportation Needs: Low Risk  (11/25/2024)    Transportation Needs     Within the past 12 months, has lack of transportation kept you from medical appointments, getting your medicines, non-medical meetings or appointments, work, or from getting things that you need?: No   Physical Activity: Sufficiently Active (11/25/2024)    Exercise Vital Sign     Days of Exercise per Week: 4 days     Minutes of Exercise per Session: 50 min   Stress: No Stress Concern Present (11/25/2024)    [employer] - Occupational Stress Questionnaire     Feeling of Stress : Only a little   Social Connections: Unknown (11/25/2024)    Social Connection and Isolation Panel [NHANES]     Frequency of Social Gatherings with Friends and Family: Twice a week   Interpersonal Safety: Low Risk  (11/26/2024)    Interpersonal Safety     Do you feel physically and emotionally  "safe where you currently live?: Yes     Within the past 12 months, have you been hit, slapped, kicked or otherwise physically hurt by someone?: No     Within the past 12 months, have you been humiliated or emotionally abused in other ways by your partner or ex-partner?: No   Housing Stability: Low Risk  (11/25/2024)    Housing Stability     Do you have housing? : Yes     Are you worried about losing your housing?: No       ROS:  Constitutional: No fever, chills, or sweats. No weight gain/loss.   ENT: No visual disturbance, ear ache, epistaxis, sore throat.   Allergies/Immunologic: Negative.   Respiratory: No cough, hemoptysis.   Cardiovascular: As per HPI.   GI: No nausea, vomiting, hematemesis, melena, or hematochezia.   : No urinary frequency, dysuria, or hematuria.   Integument: Negative.   Psychiatric: Negative.   Neuro: Negative.   Endocrinology: Negative.   Musculoskeletal: No myalgia.    VITAL SIGNS:  BP (!) 124/90 (BP Location: Left arm, Patient Position: Sitting)   Pulse 62   Ht 1.803 m (5' 11\")   Wt 111.3 kg (245 lb 6.4 oz)   SpO2 98%   BMI 34.23 kg/m    Body mass index is 34.23 kg/m .  Wt Readings from Last 2 Encounters:   12/19/24 111.3 kg (245 lb 6.4 oz)   11/26/24 109.8 kg (242 lb)       PHYSICAL EXAM  Sarah Frost IS A 64 year old female.in no acute distress.  HEENT: Unremarkable.      LABS    Lab Results   Component Value Date    WBC 3.8 11/21/2024    WBC 4.8 01/18/2021     Lab Results   Component Value Date    RBC 4.27 11/21/2024    RBC 4.30 01/18/2021     Lab Results   Component Value Date    HGB 14.1 11/21/2024    HGB 13.9 01/18/2021     Lab Results   Component Value Date    HCT 42.3 11/21/2024    HCT 42.0 01/18/2021     No components found for: \"MCT\"  Lab Results   Component Value Date    MCV 99 11/21/2024    MCV 98 01/18/2021     Lab Results   Component Value Date    MCH 33.0 11/21/2024    MCH 32.3 01/18/2021     Lab Results   Component Value Date    [facility] 33.3 11/21/2024    [facility] 33.1 " 01/18/2021     Lab Results   Component Value Date    RDW 12.7 11/21/2024    RDW 12.5 01/18/2021     Lab Results   Component Value Date     11/21/2024     01/18/2021      Recent Labs   Lab Test 11/21/24  1301 11/22/23  1108    144   POTASSIUM 4.4 4.7   CHLORIDE 104 106   CO2 24 28   ANIONGAP 13 10   * 94   BUN 12.6 14.0   CR 0.95 0.87   QUAN 10.1 10.1     Recent Labs   Lab Test 11/21/24  1301 07/25/24  1100   CHOL 237* 238*   HDL 92 91   * 138*   TRIG 81 47   NHDL 145* 147*        ASSESSMENT AND PLAN:  1. Small restrictive membranous VSD  2. PVCS  3. Bicuspid aortic valve with mild stenosis -mean gradient of 19 mmHg.   4. Left parietal meningioma for which she underwent resection in May of 2023.   5. Family history of early CAD  6. Impaired fasting blood sugar with normal A1C  7. BMI 34  8. Aortic root 4.1 CM on MRA 11.2024     It was a pleasure to be involved in the care of Ms. Frost. I reviewed her history and symptoms in detail and reviewed her MRI/MRA personally.  She has a BAV with mild stenosis and mild aortic root enlargement on MRA.  She also has a small restrictive VSD with no significant chamber enlargement and no other concerning valve disease or evidence of PH.     Her A1c is 5.4, . 10 year ASCVD risk is low (4.1%).  BP controlled. She will continue to work on diet and exercise. I referred to her to weight management clinic and discussed apps such as My Fitness Pal that she can try.      Discussed first degree relatives getting screened with an echo for BAV.      Discussed follow up in 2026 with MRA and echo and lipids. She will let us know if there are any issues in the interim.     It was a pleasure to be involved in her care. Please do not hesitate to contact me with questions.      ABE Patterson MD   40 minutes face-face, documentation and review of records on day of visit

## 2024-12-19 ENCOUNTER — OFFICE VISIT (OUTPATIENT)
Dept: CARDIOLOGY | Facility: CLINIC | Age: 64
End: 2024-12-19
Payer: COMMERCIAL

## 2024-12-19 VITALS
DIASTOLIC BLOOD PRESSURE: 90 MMHG | HEIGHT: 71 IN | BODY MASS INDEX: 34.35 KG/M2 | WEIGHT: 245.4 LBS | OXYGEN SATURATION: 98 % | SYSTOLIC BLOOD PRESSURE: 124 MMHG | HEART RATE: 62 BPM

## 2024-12-19 DIAGNOSIS — Q23.81 BICUSPID AORTIC VALVE: ICD-10-CM

## 2024-12-19 DIAGNOSIS — Q24.9 ADULT CONGENITAL HEART DISEASE: ICD-10-CM

## 2024-12-19 DIAGNOSIS — Q21.0 SEPTAL DEFECT, VENTRICULAR: ICD-10-CM

## 2024-12-19 PROCEDURE — 99215 OFFICE O/P EST HI 40 MIN: CPT | Performed by: INTERNAL MEDICINE

## 2024-12-19 NOTE — PATIENT INSTRUCTIONS
"Thank you for visiting the Adult Congenital and Cardiovascular Genetics Clinic at the [facility].    Cardiology Providers you saw during your visit:  ABE Patterson MD    Diagnosis:  VSD and BAV    Results:  ABE Patterson MD reviewed the results of your EKG, cardiac MRI/MRA testing today in clinic.    If you have questions or concerns, please call us at [phone] or contact us through [employer].  ______________________________________________________________________________    Recommendations from your Cardiology Provider TODAY:    Referral for weight management  First degree relatives should be screened for bicuspid aortic valve with an echocardiogram, this can be done with their primary care provider.       ____________________________________________________________________________________________________    Follow-up Plan:  Follow up with Dr Patterson in 2026 with a chest MRA, echocardiogram and lipids prior    ____________________________________________________________________________________________________    If you have questions or concerns, please call us at [phone] or contact us through [employer]. Our fax number is [phone]    Amanda Carbajal RN RN, BSN   Meliza Cristobal (Scheduling)  Nurse Care Coordinator     Clinic   Adult Congenital and CV Genetics              Adult Congenital and CV Genetics  [facility]              [facility]        For after hours urgent needs, call [phone] and ask to speak to the \"On-Call Cardiologist.\"    For emergencies call 911.      ____________________________________________________________________________________________________    Additional Important Information for Your Heart Health      General Cardiac Recommendations:  Continue to eat a heart healthy, low salt diet.  Continue to get 20-30 minutes of aerobic activity, 4-5 days per week.  Examples of aerobic activity " include walking, running, swimming, cycling, etc.  Continue to observe good oral hygiene, with regular dental visits.        SBE prophylaxis (antibiotics needed before dental appointments):   Yes____  No__x__    SBE prophylaxis applies to patients with certain heart conditions who are recommended to take antibiotics before dental appointments and other specific procedures. These antibiotics are to help prevent an infection of the heart (endocarditis) that certain patients are at higher risk of developing. The guidelines used come from the American Heart Association and are periodically updated.    If YES is checked, follow the recommendations outlined below:  Take antibiotic(s) prior to recommended dental procedures and procedures involving the respiratory tract or procedures involving infections of the skin, muscle or bones.   SBE prophylaxis is not needed for routine gastrointestinal and genitourinary procedures (ie. Colonoscopy or vaginal delivery)  Observe good oral hygiene daily, as advised by your dentist. Get regular professional dental care.  Keep cuts and other open injuries clean.  All infections should be treated as soon as possible.  Symptoms of Infective Endocarditis could include: fever lasting more than 4-5 days or a recurrent fever that initially resolves but returns within 1-2 days). Call us a [phone] if you are experiencing these symptoms.        FASTING CHOLESTEROL was checked in the last 5 years YES__x__  NO____ (2024)  If no, please follow up with your primary care physician. You should have a cholesterol screening every 5 years at minimum, and every year if taking a medication for your cholesterol levels.

## 2024-12-19 NOTE — NURSING NOTE
Cardiac Testing: Patient given instructions regarding  echocardiogram and chest MRA. Discussed purpose, preparation, procedure and when to expect results reported back to the patient. Patient demonstrated understanding of this information and agreed to call with further questions or concerns.    Return Appointment: Patient given instructions regarding scheduling next clinic visit. Patient demonstrated understanding of this information and agreed to call with further questions or concerns.    Patient stated she understood all health information given and agreed to call with further questions or concerns.

## 2024-12-19 NOTE — Clinical Note
2024    Ellie Veras PA-C  [address] [facility] [address]    RE: Sarah Laraluis daniel       Dear Colleague,     I had the pleasure of seeing Sarah Frost in the [facility].  CARDIOLOGY CONSULTATION:    Ms Frost is a 64 yr old female with a past medical history significant for VSD, bicuspid valve with moderate stenosis, PVCs and recent diagnosis in May 2023 of left parietal meningioma for which she underwent resection in May of 2023.  She has focal aware seizures with intermittent right leg weakness, for which she is followed by neurology.   At the time of her hospitalization she was seen by my colleague Dr. Gilbert, who cleared her for surgery, and recommended follow up in [facility] clinic. I met her in 2023 initially and she is here for follow up.     Echo 2023 showed a small restrictive membranous ventricular septal defect with small left to right ventricular shunt.  She had mild to moderate valvular aortic stenosis in the setting of a bicuspid aortic valve with a mean gradient of 19 mmHg.  Her ascending aorta and aortic root were no dilated on echo (ascending 3.7 CM) and she had a normal EF at 60%. She underwent a cardiac MRI/MRA as part of her evaluation 2024 and her aortic root measured 4.1 CM and her QP:Qs was 1.04     Has had an impaired fasting blood sugar in the past so we checked an A1C and it was normal.  She has an elevated cholesterol but not requiring statins at this time; she is working on improving her diet.  She was a vegetarian for 8 years.   She does have a family history of early CAD.     She has 2 daughters and 7 siblings and none of them have been screened for BAV but she told them they need to be.  Also need the echo to screen for aneurysms.     She is retired physical therapist.  Her sister had a child born with d-transposition and  shortly after birth as it was not recognized initially.  She continues to take antibiotics before the  dentist.  She has good control of her BP. No symptoms of concern.        PAST MEDICAL HISTORY:  Past Medical History:   Diagnosis Date    Congenital anomaly of heart valve        CURRENT MEDICATIONS:  Current Outpatient Medications   Medication Sig Dispense Refill    amoxicillin (AMOXIL) 500 MG tablet TAKE 4 TABLETS BY MOUTH 30 TO 60 MINUTES BEFORE PROCEDURE      multivitamin w/minerals (MULTI-VITAMIN) tablet Take 1 tablet by mouth daily      nystatin (MYCOSTATIN) 123144 UNIT/GM external cream Apply topically 2 times daily. 30 g 3       PAST SURGICAL HISTORY:  Past Surgical History:   Procedure Laterality Date    ANGIOGRAM      as a child for VSD    COLONOSCOPY N/A 10/04/2024    Procedure: Colonoscopy with polypectomy by cold biopsy forceps;  Surgeon: Isac Moise MD;  Location: [facility]    HYSTERECTOMY TOTAL ABDOMINAL  2014    due to fibroids, with right oophorectomy and bilateral salpingectomy    OPTICAL TRACKING SYSTEM CRANIOTOMY, EXCISE TUMOR, COMBINED Left 2023    Procedure: CRANIOTOMY, USING OPTICAL TRACKING SYSTEM, WITH NEOPLASM EXCISION STEALTH ASSISTED LEFT FRONTOPARIETAL CRANIOTOMY WITH RESCETION OF BRAIN TUMOR;  Surgeon: Jude Monroy MD;  Location: [facility] OR    TONSILLECTOMY      as a child       ALLERGIES  Patient has no known allergies.    FAMILY HX:  Family History   Problem Relation Age of Onset    Breast Cancer Mother 60    Lung Cancer Mother 65        smoker -  from this at 68    Myocardial Infarction Father 62    Substance Abuse Sister     Cerebral aneurysm Brother 62        brain aneurysm    Mental Illness Brother     Substance Abuse Brother     Prostate Cancer Brother     Substance Abuse Brother     Chronic Obstructive Pulmonary Disease Maternal Grandfather     Myocardial Infarction Maternal Grandfather     No Known Problems Paternal Half-Sister     Breast Cancer Paternal Half-Sister 50    Breast Cancer Paternal Half-Sister 50    Colon Cancer No family hx of        SOCIAL  HX:  Social History     Socioeconomic History    Marital status:      Spouse name: Austin    Number of children: 2   Occupational History    Occupation: Retired     Comment: PT Assistant - [employer]   Tobacco Use    Smoking status: Never     Passive exposure: Never    Smokeless tobacco: Never   Vaping Use    Vaping status: Never Used   Substance and Sexual Activity    Alcohol use: Yes     Alcohol/week: 3.0 standard drinks of alcohol     Types: 3 Standard drinks or equivalent per week     Comment: wine occasionally    Drug use: Never    Sexual activity: Not Currently     Partners: Male     Birth control/protection: Female Surgical   Other Topics Concern    Parent/sibling w/ CABG, MI or angioplasty before 65F 55M? Yes     Social Drivers of Health     Financial Resource Strain: Low Risk  (11/25/2024)    Financial Resource Strain     Within the past 12 months, have you or your family members you live with been unable to get utilities (heat, electricity) when it was really needed?: No   Food Insecurity: Low Risk  (11/25/2024)    Food Insecurity     Within the past 12 months, did you worry that your food would run out before you got money to buy more?: No     Within the past 12 months, did the food you bought just not last and you didn t have money to get more?: No   Transportation Needs: Low Risk  (11/25/2024)    Transportation Needs     Within the past 12 months, has lack of transportation kept you from medical appointments, getting your medicines, non-medical meetings or appointments, work, or from getting things that you need?: No   Physical Activity: Sufficiently Active (11/25/2024)    Exercise Vital Sign     Days of Exercise per Week: 4 days     Minutes of Exercise per Session: 50 min   Stress: No Stress Concern Present (11/25/2024)    [employer] [place] [employer] - Occupational Stress Questionnaire     Feeling of Stress : Only a little   Social Connections: Unknown (11/25/2024)    Social Connection  "and Isolation Panel [NHANES]     Frequency of Social Gatherings with Friends and Family: Twice a week   Interpersonal Safety: Low Risk  (11/26/2024)    Interpersonal Safety     Do you feel physically and emotionally safe where you currently live?: Yes     Within the past 12 months, have you been hit, slapped, kicked or otherwise physically hurt by someone?: No     Within the past 12 months, have you been humiliated or emotionally abused in other ways by your partner or ex-partner?: No   Housing Stability: Low Risk  (11/25/2024)    Housing Stability     Do you have housing? : Yes     Are you worried about losing your housing?: No       ROS:  Constitutional: No fever, chills, or sweats. No weight gain/loss.   ENT: No visual disturbance, ear ache, epistaxis, sore throat.   Allergies/Immunologic: Negative.   Respiratory: No cough, hemoptysis.   Cardiovascular: As per HPI.   GI: No nausea, vomiting, hematemesis, melena, or hematochezia.   : No urinary frequency, dysuria, or hematuria.   Integument: Negative.   Psychiatric: Negative.   Neuro: Negative.   Endocrinology: Negative.   Musculoskeletal: No myalgia.    VITAL SIGNS:  BP (!) 124/90 (BP Location: Left arm, Patient Position: Sitting)   Pulse 62   Ht 1.803 m (5' 11\")   Wt 111.3 kg (245 lb 6.4 oz)   SpO2 98%   BMI 34.23 kg/m    Body mass index is 34.23 kg/m .  Wt Readings from Last 2 Encounters:   12/19/24 111.3 kg (245 lb 6.4 oz)   11/26/24 109.8 kg (242 lb)       PHYSICAL EXAM  Sarah Frost IS A 64 year old female.in no acute distress.  HEENT: Unremarkable.      LABS    Lab Results   Component Value Date    WBC 3.8 11/21/2024    WBC 4.8 01/18/2021     Lab Results   Component Value Date    RBC 4.27 11/21/2024    RBC 4.30 01/18/2021     Lab Results   Component Value Date    HGB 14.1 11/21/2024    HGB 13.9 01/18/2021     Lab Results   Component Value Date    HCT 42.3 11/21/2024    HCT 42.0 01/18/2021     No components found for: \"MCT\"  Lab Results   Component " Value Date    MCV 99 11/21/2024    MCV 98 01/18/2021     Lab Results   Component Value Date    MCH 33.0 11/21/2024    MCH 32.3 01/18/2021     Lab Results   Component Value Date    MCHC 33.3 11/21/2024    MCHC 33.1 01/18/2021     Lab Results   Component Value Date    RDW 12.7 11/21/2024    RDW 12.5 01/18/2021     Lab Results   Component Value Date     11/21/2024     01/18/2021      Recent Labs   Lab Test 11/21/24  1301 11/22/23  1108    144   POTASSIUM 4.4 4.7   CHLORIDE 104 106   CO2 24 28   ANIONGAP 13 10   * 94   BUN 12.6 14.0   CR 0.95 0.87   QUAN 10.1 10.1     Recent Labs   Lab Test 11/21/24  1301 07/25/24  1100   CHOL 237* 238*   HDL 92 91   * 138*   TRIG 81 47   NHDL 145* 147*        ASSESSMENT AND PLAN:  1. Small restrictive membranous VSD  2. PVCS  3. Bicuspid aortic valve with mild stenosis -mean gradient of 19 mmHg.   4. Left parietal meningioma for which she underwent resection in May of 2023.   5. Family history of early CAD  6. Impaired fasting blood sugar with normal A1C  7. BMI 34  8. Aortic root 4.1 CM on MRA 11.2024     It was a pleasure to be involved in the care of Ms. Frost. I reviewed her history and symptoms in detail and reviewed her MRI/MRA personally.  She has a BAV with mild stenosis and mild aortic root enlargement on MRA.  She also has a small restrictive VSD with no significant chamber enlargement and no other concerning valve disease or evidence of PH.     Her A1c is 5.4, . 10 year ASCVD risk is low (4.1%).  BP controlled. She will continue to work on diet and exercise. I referred to her to weight management clinic and discussed apps such as My Fitness Pal that she can try.      Discussed first degree relatives getting screened with an echo for BAV.      Discussed follow up in 2026 with MRA and echo and lipids. She will let us know if there are any issues in the interim.     It was a pleasure to be involved in her care. Please do not hesitate to  contact me with questions.      ABE Patterson MD   40 minutes face-face, documentation and review of records on day of visit       Thank you for allowing me to participate in the care of your patient.      Sincerely,     Willy Patterson MD     [facility]  cc:   Willy Patterson MD  [address]  [place] [address]

## 2025-03-22 ENCOUNTER — HEALTH MAINTENANCE LETTER (OUTPATIENT)
Age: 65
End: 2025-03-22

## 2025-08-06 ENCOUNTER — ANCILLARY PROCEDURE (OUTPATIENT)
Dept: MRI IMAGING | Facility: CLINIC | Age: 65
End: 2025-08-06
Attending: NURSE PRACTITIONER
Payer: COMMERCIAL

## 2025-08-06 DIAGNOSIS — D32.9 MENINGIOMA (H): ICD-10-CM

## 2025-08-06 PROCEDURE — A9585 GADOBUTROL INJECTION: HCPCS | Performed by: NURSE PRACTITIONER

## 2025-08-06 PROCEDURE — 255N000002 HC RX 255 OP 636: Performed by: NURSE PRACTITIONER

## 2025-08-06 PROCEDURE — 70553 MRI BRAIN STEM W/O & W/DYE: CPT

## 2025-08-06 RX ORDER — GADOBUTROL 604.72 MG/ML
10 INJECTION INTRAVENOUS ONCE
Status: COMPLETED | OUTPATIENT
Start: 2025-08-06 | End: 2025-08-06

## 2025-08-06 RX ADMIN — GADOBUTROL 10 ML: 604.72 INJECTION INTRAVENOUS at 10:39

## 2025-08-13 ENCOUNTER — OFFICE VISIT (OUTPATIENT)
Dept: NEUROSURGERY | Facility: CLINIC | Age: 65
End: 2025-08-13
Payer: COMMERCIAL

## 2025-08-13 VITALS — HEART RATE: 68 BPM | DIASTOLIC BLOOD PRESSURE: 97 MMHG | SYSTOLIC BLOOD PRESSURE: 152 MMHG | OXYGEN SATURATION: 98 %

## 2025-08-13 DIAGNOSIS — Z98.890 S/P CRANIOTOMY: ICD-10-CM

## 2025-08-13 DIAGNOSIS — D32.9 MENINGIOMA (H): Primary | ICD-10-CM

## 2025-08-13 DIAGNOSIS — G93.89 BRAIN MASS: ICD-10-CM

## 2025-08-13 ASSESSMENT — PAIN SCALES - GENERAL: PAINLEVEL_OUTOF10: NO PAIN (0)

## (undated) DEVICE — DRAPE WARMER 66X44" ORS-300

## (undated) DEVICE — MANIFOLD NEPTUNE 4 PORT 700-20

## (undated) DEVICE — TOOL DISSECT MIDAS MR8 9CM BALL 6MM DIA MR8-9BA60

## (undated) DEVICE — SPONGE COTTONOID 1/2X1/2" 80-1400

## (undated) DEVICE — Device

## (undated) DEVICE — PIN SKULL MAYFIELD ADULT TITANIUM 3/PK A1120

## (undated) DEVICE — DRAIN JACKSON PRATT CHANNEL 10FR RND SIL W/TROCAR JP-2227

## (undated) DEVICE — SPONGE COTTONOID 1X3" 80-1408

## (undated) DEVICE — SU ETHILON 3-0 FS-1 18" 669H

## (undated) DEVICE — IOM SUPPLIES

## (undated) DEVICE — IONM UP TO 7 HOURS

## (undated) DEVICE — SURGICEL HEMOSTAT 2X14" 1951

## (undated) DEVICE — SURGICEL HEMOSTAT 3X4" NUKNIT 1943

## (undated) DEVICE — DRAPE MICROSCOPE LEICA 54X150" AR8033650

## (undated) DEVICE — DRAPE ISOLATION BAG 1003

## (undated) DEVICE — RETR ELASTIC STAYS LONE STAR BLUNT DUAL LEAD 3550-1G

## (undated) DEVICE — LINEN TOWEL PACK X5 5464

## (undated) DEVICE — TOOL DISSECT MIDAS MR8 F2/7CM TAPER 2.3MM DI MR8-F2/7TA23

## (undated) DEVICE — SPONGE SURGIFOAM 100 1974

## (undated) DEVICE — SURGICEL FIBRILLAR HEMOSTAT 1"X2" 1961

## (undated) DEVICE — KIT ENDO TURNOVER/PROCEDURE W/CLEAN A SCOPE LINERS 103888

## (undated) DEVICE — DRAIN JACKSON PRATT RESERVOIR 100ML SU130-1305

## (undated) DEVICE — NDL BLUNT 17GA 1.5" 8881202330

## (undated) DEVICE — CATH TRAY FOLEY COUDE SURESTEP 16FR W/DRN BAG LATEX A304416A

## (undated) DEVICE — SOL NACL 0.9% IRRIG 1000ML BOTTLE 2F7124

## (undated) DEVICE — SU MONOCRYL 4-0 PS-2 18" UND Y496G

## (undated) DEVICE — MARKER SPHERES PASSIVE MEDT PACK 5 8801075

## (undated) DEVICE — TUBING SUCTION 12"X1/4" N612

## (undated) DEVICE — PACK NEURO SNE15SNFSA

## (undated) DEVICE — SOL WATER IRRIG 1000ML BOTTLE 2F7114

## (undated) DEVICE — RX SURGIFLO HEMOSTATIC MATRIX W/THROMBIN 8ML 2994

## (undated) DEVICE — CASSETTE SONOPET IRRIG SUCTION STRL 5500-573-000

## (undated) DEVICE — ESU GROUND PAD UNIVERSAL W/O CORD

## (undated) DEVICE — DRSG BIOPATCH GERMICIDAL SPLIT SPONGE 7MM LG

## (undated) DEVICE — SU NUROLON 4-0 TF CR 8X18" C584D

## (undated) DEVICE — SU VICRYL 2-0 CT-2 CR 8X18" J726D

## (undated) DEVICE — ESU ELEC BLADE 2.75" COATED/INSULATED E1455

## (undated) DEVICE — TUBING SET IRR MALIS BIPOLAR CORD INTEGRATE 6790-100-003

## (undated) DEVICE — PREP SKIN SCRUB TRAY 4461A

## (undated) DEVICE — DRSG KERLIX 4 1/2"X4YDS ROLL 6715

## (undated) DEVICE — SOL NACL 0.9% INJ 250ML BAG 2B1322Q

## (undated) DEVICE — DRAPE POUCH INSTRUMENT 1018

## (undated) DEVICE — TIP ASPIRATOR SONOPET IQ MICRO 5500-25S-309

## (undated) RX ORDER — PROPOFOL 10 MG/ML
INJECTION, EMULSION INTRAVENOUS
Status: DISPENSED
Start: 2023-05-26

## (undated) RX ORDER — HYDROMORPHONE HYDROCHLORIDE 1 MG/ML
INJECTION, SOLUTION INTRAMUSCULAR; INTRAVENOUS; SUBCUTANEOUS
Status: DISPENSED
Start: 2023-05-26

## (undated) RX ORDER — REMIFENTANIL HYDROCHLORIDE 1 MG/ML
INJECTION, POWDER, LYOPHILIZED, FOR SOLUTION INTRAVENOUS
Status: DISPENSED
Start: 2023-05-26

## (undated) RX ORDER — DEXAMETHASONE SODIUM PHOSPHATE 4 MG/ML
INJECTION, SOLUTION INTRA-ARTICULAR; INTRALESIONAL; INTRAMUSCULAR; INTRAVENOUS; SOFT TISSUE
Status: DISPENSED
Start: 2023-05-26

## (undated) RX ORDER — FENTANYL CITRATE 0.05 MG/ML
INJECTION, SOLUTION INTRAMUSCULAR; INTRAVENOUS
Status: DISPENSED
Start: 2023-05-26

## (undated) RX ORDER — GINSENG 100 MG
CAPSULE ORAL
Status: DISPENSED
Start: 2023-05-26

## (undated) RX ORDER — CEFAZOLIN SODIUM 1 G/3ML
INJECTION, POWDER, FOR SOLUTION INTRAMUSCULAR; INTRAVENOUS
Status: DISPENSED
Start: 2023-05-26

## (undated) RX ORDER — ONDANSETRON 2 MG/ML
INJECTION INTRAMUSCULAR; INTRAVENOUS
Status: DISPENSED
Start: 2023-05-26

## (undated) RX ORDER — FENTANYL CITRATE 50 UG/ML
INJECTION, SOLUTION INTRAMUSCULAR; INTRAVENOUS
Status: DISPENSED
Start: 2024-10-04

## (undated) RX ORDER — EPHEDRINE SULFATE 50 MG/ML
INJECTION, SOLUTION INTRAMUSCULAR; INTRAVENOUS; SUBCUTANEOUS
Status: DISPENSED
Start: 2023-05-26

## (undated) RX ORDER — FENTANYL CITRATE 50 UG/ML
INJECTION, SOLUTION INTRAMUSCULAR; INTRAVENOUS
Status: DISPENSED
Start: 2023-05-26

## (undated) RX ORDER — BUPIVACAINE HYDROCHLORIDE AND EPINEPHRINE 2.5; 5 MG/ML; UG/ML
INJECTION, SOLUTION EPIDURAL; INFILTRATION; INTRACAUDAL; PERINEURAL
Status: DISPENSED
Start: 2023-05-26